# Patient Record
Sex: MALE | Race: WHITE | Employment: OTHER | URBAN - METROPOLITAN AREA
[De-identification: names, ages, dates, MRNs, and addresses within clinical notes are randomized per-mention and may not be internally consistent; named-entity substitution may affect disease eponyms.]

---

## 2019-01-27 ENCOUNTER — TRANSFERRED RECORDS (OUTPATIENT)
Dept: HEALTH INFORMATION MANAGEMENT | Facility: CLINIC | Age: 64
End: 2019-01-27

## 2019-01-27 ENCOUNTER — HOSPITAL ENCOUNTER (EMERGENCY)
Facility: CLINIC | Age: 64
Discharge: HOME | End: 2019-01-27
Attending: EMERGENCY MEDICINE | Admitting: EMERGENCY MEDICINE
Payer: COMMERCIAL

## 2019-01-27 ENCOUNTER — APPOINTMENT (OUTPATIENT)
Dept: GENERAL RADIOLOGY | Facility: CLINIC | Age: 64
End: 2019-01-27
Payer: COMMERCIAL

## 2019-01-27 VITALS
OXYGEN SATURATION: 96 % | WEIGHT: 180.56 LBS | HEART RATE: 58 BPM | TEMPERATURE: 97.8 F | SYSTOLIC BLOOD PRESSURE: 124 MMHG | RESPIRATION RATE: 22 BRPM | DIASTOLIC BLOOD PRESSURE: 67 MMHG

## 2019-01-27 DIAGNOSIS — R07.9 CHEST PAIN, UNSPECIFIED TYPE: ICD-10-CM

## 2019-01-27 LAB
ANION GAP SERPL CALCULATED.3IONS-SCNC: 5 MMOL/L (ref 3–14)
BASOPHILS # BLD AUTO: 0.1 10E9/L (ref 0–0.2)
BASOPHILS NFR BLD AUTO: 0.7 %
BUN SERPL-MCNC: 15 MG/DL (ref 7–30)
CALCIUM SERPL-MCNC: 9.1 MG/DL (ref 8.5–10.1)
CHLORIDE SERPL-SCNC: 108 MMOL/L (ref 94–109)
CO2 SERPL-SCNC: 26 MMOL/L (ref 20–32)
CREAT SERPL-MCNC: 0.88 MG/DL (ref 0.66–1.25)
DIFFERENTIAL METHOD BLD: NORMAL
EOSINOPHIL # BLD AUTO: 0.2 10E9/L (ref 0–0.7)
EOSINOPHIL NFR BLD AUTO: 2.6 %
ERYTHROCYTE [DISTWIDTH] IN BLOOD BY AUTOMATED COUNT: 12.8 % (ref 10–15)
GFR SERPL CREATININE-BSD FRML MDRD: >90 ML/MIN/{1.73_M2}
GLUCOSE SERPL-MCNC: 96 MG/DL (ref 70–99)
HCT VFR BLD AUTO: 44.1 % (ref 40–53)
HGB BLD-MCNC: 14.3 G/DL (ref 13.3–17.7)
IMM GRANULOCYTES # BLD: 0 10E9/L (ref 0–0.4)
IMM GRANULOCYTES NFR BLD: 0.2 %
LYMPHOCYTES # BLD AUTO: 1.5 10E9/L (ref 0.8–5.3)
LYMPHOCYTES NFR BLD AUTO: 16.6 %
MCH RBC QN AUTO: 30.1 PG (ref 26.5–33)
MCHC RBC AUTO-ENTMCNC: 32.4 G/DL (ref 31.5–36.5)
MCV RBC AUTO: 93 FL (ref 78–100)
MONOCYTES # BLD AUTO: 0.7 10E9/L (ref 0–1.3)
MONOCYTES NFR BLD AUTO: 8.3 %
NEUTROPHILS # BLD AUTO: 6.3 10E9/L (ref 1.6–8.3)
NEUTROPHILS NFR BLD AUTO: 71.6 %
NRBC # BLD AUTO: 0 10*3/UL
NRBC BLD AUTO-RTO: 0 /100
PLATELET # BLD AUTO: 306 10E9/L (ref 150–450)
POTASSIUM SERPL-SCNC: 4.1 MMOL/L (ref 3.4–5.3)
RBC # BLD AUTO: 4.75 10E12/L (ref 4.4–5.9)
SODIUM SERPL-SCNC: 139 MMOL/L (ref 133–144)
TROPONIN I SERPL-MCNC: <0.015 UG/L (ref 0–0.04)
TROPONIN I SERPL-MCNC: <0.015 UG/L (ref 0–0.04)
WBC # BLD AUTO: 8.8 10E9/L (ref 4–11)

## 2019-01-27 PROCEDURE — 80048 BASIC METABOLIC PNL TOTAL CA: CPT | Performed by: EMERGENCY MEDICINE

## 2019-01-27 PROCEDURE — 99285 EMERGENCY DEPT VISIT HI MDM: CPT | Mod: 25

## 2019-01-27 PROCEDURE — 84484 ASSAY OF TROPONIN QUANT: CPT | Performed by: EMERGENCY MEDICINE

## 2019-01-27 PROCEDURE — 93005 ELECTROCARDIOGRAM TRACING: CPT

## 2019-01-27 PROCEDURE — 71046 X-RAY EXAM CHEST 2 VIEWS: CPT

## 2019-01-27 PROCEDURE — 85025 COMPLETE CBC W/AUTO DIFF WBC: CPT | Performed by: EMERGENCY MEDICINE

## 2019-01-27 RX ORDER — ASPIRIN 81 MG/1
81 TABLET, CHEWABLE ORAL DAILY
COMMUNITY
End: 2023-07-05

## 2019-01-27 RX ORDER — ATORVASTATIN CALCIUM 10 MG/1
10 TABLET, FILM COATED ORAL DAILY
COMMUNITY
End: 2023-08-29

## 2019-01-27 RX ORDER — DUTASTERIDE 0.5 MG/1
0.5 CAPSULE, LIQUID FILLED ORAL DAILY
COMMUNITY
End: 2023-07-05

## 2019-01-27 RX ORDER — DOXAZOSIN 4 MG/1
4 TABLET ORAL AT BEDTIME
COMMUNITY

## 2019-01-27 RX ORDER — CHLORAL HYDRATE 500 MG
2 CAPSULE ORAL DAILY
COMMUNITY

## 2019-01-27 ASSESSMENT — ENCOUNTER SYMPTOMS
SHORTNESS OF BREATH: 0
NUMBNESS: 1
DIAPHORESIS: 0

## 2019-01-27 NOTE — ED TRIAGE NOTES
Pt states he had left sided chest pain for about 20 minutes around 10:30am while sitting at computer. States his pulse felt fast at the time. 324 ASA total taken today. ABC's intact, alert and oriented x3.

## 2019-01-27 NOTE — ED AVS SNAPSHOT
[facility] Emergency Department  [address]  [facility] [address]  Phone:  [phone]  Fax:  [phone]                                    Francisco Miguel   MRN: [id number]    Department:  [facility] Emergency Department   Date of Visit:  1/27/2019           After Visit Summary Signature Page    I have received my discharge instructions, and my questions have been answered. I have discussed any challenges I see with this plan with the nurse or doctor.    ..........................................................................................................................................  Patient/Patient Representative Signature      ..........................................................................................................................................  Patient Representative Print Name and Relationship to Patient    ..................................................               ................................................  Date                                   Time    ..........................................................................................................................................  Reviewed by Signature/Title    ...................................................              ..............................................  Date                                               Time          22EPIC Rev 08/18

## 2019-01-27 NOTE — ED PROVIDER NOTES
History     Chief Complaint:  Chest Pain    HPI   Francisco Miguel is a 63 year old male with history of TIA who presents to the emergency department for evaluation of chest pain. The patient states that he arrived here after being referred from [facility] urgent care. Earlier today around 1000 hours he had left sided chest pain that lasted for about 15-20 minutes. He reports some numbness into his back as well. He took 2 baby aspirin. He denies shortness of breath or diaphoresis. Patient notes he has a similar episode 2 years ago.    Allergies:  No known Drug Allergies      Medications:    aspirin (ASA) 81 MG chewable tablet  atorvastatin (LIPITOR) 10 MG tablet  doxazosin (CARDURA) 1 MG tablet  dutasteride (AVODART) 0.5 MG capsule  fish oil-omega-3 fatty acids 1000 MG capsule  metFORMIN (GLUCOPHAGE) 500 MG tablet    Past Medical History:    Diabetes  High cholesterol  TIA     Past Surgical History:    Orthopedic surgery     Family History:    History reviewed. No pertinent family history.     Social History:  Social history reviewed. No pertinent social history     Review of Systems   Constitutional: Negative for diaphoresis.   Respiratory: Negative for shortness of breath.    Cardiovascular: Positive for chest pain.   Neurological: Positive for numbness.   All other systems reviewed and are negative.        Physical Exam   First Vitals:  BP: 160/85  Pulse: 72  Heart Rate: 72  Temp: 97.8  F (36.6  C)  Resp: 16  Weight: 81.9 kg (180 lb 8.9 oz)  SpO2: 97 %      Physical Exam  Constitutional: Patient is well appearing. No distress.  Head: Atraumatic.  Mouth/Throat: Oropharynx is clear and moist. No oropharyngeal exudate.  Eyes: Conjunctivae and EOM are normal. No scleral icterus.  Neck: Normal range of motion. Neck supple.   Cardiovascular: Normal rate, regular rhythm, normal heart sounds and intact distal pulses.   Pulmonary/Chest: Breath sounds normal. No respiratory distress.  Abdominal: Soft. Bowel sounds are  normal. No distension. No tenderness. No rebound or guarding.   Musculoskeletal: Normal range of motion. No edema or tenderness.   Neurological: Alert and orientated to person, place, and time. No observable focal neuro deficit  Skin: Warm and dry. No rash noted. Not diaphoretic.     Emergency Department Course     ECG:  ECG taken at 1207, ECG read at 1207  Normal sinus rhythm  Incomplete right bundle branch block  Borderline ECG  Rate 69 bpm. KY interval 134 ms. QRS duration 100 ms. QT/QTc 386/413 ms. P-R-T axes 0 46 48.    Imaging:  Radiology findings were communicated with the patient who voiced understanding of the findings.    XR Chest 2 Views  Final Result  IMPRESSION: No active disease. There is minimal linear scarring in the  left lung.  JUDE YEE MD    Reading per radiology     Laboratory:  Laboratory findings were communicated with the patient who voiced understanding of the findings.    Troponin (Collected 1212): <0.015   CBC: WBC 8.8, HGB 14.3,   BMP: o/w WNL (Creatinine 0.88)  Troponin pending       Medications - No data to display  Emergency Department Course:  Nursing notes and vitals reviewed.   I performed an exam of the patient as documented above.   I discussed the treatment plan with the patient. They expressed understanding of this plan and consented to discharge. They will be discharged home with instructions for care and follow up. In addition, the patient will return to the emergency department if their symptoms persist, worsen, if new symptoms arise or if there is any concern.  All questions were answered.    I personally reviewed the results with the Patient and answered all related questions prior to care transition.  Impression & Plan      Medical Decision Making:  Francisco Miguel is a 63 year old male presented to the Emergency Department with a complaint of chest pain. Fortunately the workup in the ED has been unremarkable and at this time I am not concerned overly for ACS. Fleeting  atypical localized chest pain resolved now and delt 6h trop neg. The EKG shows normal sinus rhythm with incomplete right bundle branch block. The troponin is negative x2. Given these findings, he is low risk for a major cardiac event at 6 weeks.     I considered other possible causes of chest pain including PE, infection, pneumothorax, aortic dissection, and even more benign causes such as reflux and esophageal motility issues. The physical exam, laboratory, and radiological findings listed above make life threatening conditions less likely. At this time I believe the patient is stable for discharge. I have encouraged close Primary Care Physician follow up. I have also discussed an outpatient visit  for cardiac stress testing.  Normal 2 years or less ago. Anticipatory guidance given prior to discharge.     Diagnosis:    ICD-10-CM    1. Chest pain, unspecified type R07.9      Disposition:   Discharged pending care transition and second trop neg.    Discharge Medications:     Review of your medicines      UNREVIEWED medicines. Ask your doctor about these medicines      Dose / Directions   aspirin 81 MG chewable tablet  Commonly known as:  ASA      Dose:  81 mg  Take 81 mg by mouth daily  Refills:  0     atorvastatin 10 MG tablet  Commonly known as:  LIPITOR      Dose:  10 mg  Take 10 mg by mouth daily  Refills:  0     doxazosin 1 MG tablet  Commonly known as:  CARDURA      Dose:  1 mg  Take 1 mg by mouth At Bedtime  Refills:  0     dutasteride 0.5 MG capsule  Commonly known as:  AVODART      Dose:  0.5 mg  Take 0.5 mg by mouth daily  Refills:  0     fish oil-omega-3 fatty acids 1000 MG capsule      Dose:  2 g  Take 2 g by mouth daily  Refills:  0     metFORMIN 500 MG tablet  Commonly known as:  GLUCOPHAGE      Dose:  500 mg  Take 500 mg by mouth 2 times daily (with meals)  Refills:  0            Scribe Disclosure:  Syed CHRISTINA, am serving as a scribe at 12:49 PM on 1/27/2019 to document services personally  performed by Freeman Weston MD, based on my observations and the provider's statements to me.   [facility] EMERGENCY DEPARTMENT       Freeman Weston MD  01/27/19 1053

## 2019-01-28 LAB — INTERPRETATION ECG - MUSE: NORMAL

## 2023-06-20 ENCOUNTER — HOSPITAL ENCOUNTER (EMERGENCY)
Facility: CLINIC | Age: 68
Discharge: HOME | End: 2023-06-20
Attending: PHYSICIAN ASSISTANT | Admitting: PHYSICIAN ASSISTANT
Payer: COMMERCIAL

## 2023-06-20 VITALS
HEART RATE: 70 BPM | RESPIRATION RATE: 16 BRPM | OXYGEN SATURATION: 95 % | SYSTOLIC BLOOD PRESSURE: 127 MMHG | TEMPERATURE: 98.1 F | DIASTOLIC BLOOD PRESSURE: 91 MMHG | WEIGHT: 192.46 LBS

## 2023-06-20 DIAGNOSIS — T78.3XXA ANGIOEDEMA, INITIAL ENCOUNTER: ICD-10-CM

## 2023-06-20 DIAGNOSIS — K57.92 DIVERTICULITIS: ICD-10-CM

## 2023-06-20 LAB
ALBUMIN SERPL BCG-MCNC: 3.5 G/DL (ref 3.5–5.2)
ALP SERPL-CCNC: 161 U/L (ref 40–129)
ALT SERPL W P-5'-P-CCNC: 34 U/L (ref 0–70)
ANION GAP SERPL CALCULATED.3IONS-SCNC: 11 MMOL/L (ref 7–15)
AST SERPL W P-5'-P-CCNC: 31 U/L (ref 0–45)
BASOPHILS # BLD AUTO: 0 10E3/UL (ref 0–0.2)
BASOPHILS NFR BLD AUTO: 0 %
BILIRUB SERPL-MCNC: 0.2 MG/DL
BUN SERPL-MCNC: 10.2 MG/DL (ref 8–23)
CALCIUM SERPL-MCNC: 8.4 MG/DL (ref 8.8–10.2)
CHLORIDE SERPL-SCNC: 101 MMOL/L (ref 98–107)
CREAT SERPL-MCNC: 0.96 MG/DL (ref 0.67–1.17)
DEPRECATED HCO3 PLAS-SCNC: 22 MMOL/L (ref 22–29)
EOSINOPHIL # BLD AUTO: 0 10E3/UL (ref 0–0.7)
EOSINOPHIL NFR BLD AUTO: 0 %
ERYTHROCYTE [DISTWIDTH] IN BLOOD BY AUTOMATED COUNT: 13.2 % (ref 10–15)
GFR SERPL CREATININE-BSD FRML MDRD: 86 ML/MIN/1.73M2
GLUCOSE SERPL-MCNC: 162 MG/DL (ref 70–99)
HCT VFR BLD AUTO: 37.8 % (ref 40–53)
HGB BLD-MCNC: 12.4 G/DL (ref 13.3–17.7)
HOLD SPECIMEN: NORMAL
HOLD SPECIMEN: NORMAL
IMM GRANULOCYTES # BLD: 0 10E3/UL
IMM GRANULOCYTES NFR BLD: 0 %
LIPASE SERPL-CCNC: 16 U/L (ref 13–60)
LYMPHOCYTES # BLD AUTO: 0.5 10E3/UL (ref 0.8–5.3)
LYMPHOCYTES NFR BLD AUTO: 7 %
MCH RBC QN AUTO: 30.1 PG (ref 26.5–33)
MCHC RBC AUTO-ENTMCNC: 32.8 G/DL (ref 31.5–36.5)
MCV RBC AUTO: 92 FL (ref 78–100)
MONOCYTES # BLD AUTO: 0.4 10E3/UL (ref 0–1.3)
MONOCYTES NFR BLD AUTO: 5 %
NEUTROPHILS # BLD AUTO: 5.8 10E3/UL (ref 1.6–8.3)
NEUTROPHILS NFR BLD AUTO: 88 %
NRBC # BLD AUTO: 0 10E3/UL
NRBC BLD AUTO-RTO: 0 /100
PLATELET # BLD AUTO: 250 10E3/UL (ref 150–450)
POTASSIUM SERPL-SCNC: 3.9 MMOL/L (ref 3.4–5.3)
PROT SERPL-MCNC: 6.2 G/DL (ref 6.4–8.3)
RBC # BLD AUTO: 4.12 10E6/UL (ref 4.4–5.9)
SODIUM SERPL-SCNC: 134 MMOL/L (ref 136–145)
WBC # BLD AUTO: 6.7 10E3/UL (ref 4–11)

## 2023-06-20 PROCEDURE — 96361 HYDRATE IV INFUSION ADD-ON: CPT

## 2023-06-20 PROCEDURE — 96374 THER/PROPH/DIAG INJ IV PUSH: CPT

## 2023-06-20 PROCEDURE — 250N000011 HC RX IP 250 OP 636: Performed by: PHYSICIAN ASSISTANT

## 2023-06-20 PROCEDURE — 83690 ASSAY OF LIPASE: CPT | Performed by: PHYSICIAN ASSISTANT

## 2023-06-20 PROCEDURE — 258N000003 HC RX IP 258 OP 636: Performed by: PHYSICIAN ASSISTANT

## 2023-06-20 PROCEDURE — 80053 COMPREHEN METABOLIC PANEL: CPT | Performed by: PHYSICIAN ASSISTANT

## 2023-06-20 PROCEDURE — 85025 COMPLETE CBC W/AUTO DIFF WBC: CPT | Performed by: PHYSICIAN ASSISTANT

## 2023-06-20 PROCEDURE — 96375 TX/PRO/DX INJ NEW DRUG ADDON: CPT

## 2023-06-20 PROCEDURE — 250N000009 HC RX 250

## 2023-06-20 PROCEDURE — 99285 EMERGENCY DEPT VISIT HI MDM: CPT | Mod: 25

## 2023-06-20 PROCEDURE — 36415 COLL VENOUS BLD VENIPUNCTURE: CPT | Performed by: PHYSICIAN ASSISTANT

## 2023-06-20 RX ORDER — DIPHENHYDRAMINE HCL 25 MG
25 CAPSULE ORAL EVERY 6 HOURS PRN
Qty: 20 CAPSULE | Refills: 0 | Status: SHIPPED | OUTPATIENT
Start: 2023-06-20 | End: 2023-07-05

## 2023-06-20 RX ORDER — DIPHENHYDRAMINE HYDROCHLORIDE 50 MG/ML
25 INJECTION INTRAMUSCULAR; INTRAVENOUS ONCE
Status: COMPLETED | OUTPATIENT
Start: 2023-06-20 | End: 2023-06-20

## 2023-06-20 RX ORDER — DEXAMETHASONE SODIUM PHOSPHATE 10 MG/ML
10 INJECTION, SOLUTION INTRAMUSCULAR; INTRAVENOUS ONCE
Status: COMPLETED | OUTPATIENT
Start: 2023-06-20 | End: 2023-06-20

## 2023-06-20 RX ORDER — EPINEPHRINE 0.3 MG/.3ML
0.3 INJECTION SUBCUTANEOUS
Qty: 2 EACH | Refills: 0 | Status: SHIPPED | OUTPATIENT
Start: 2023-06-20

## 2023-06-20 RX ADMIN — FAMOTIDINE 20 MG: 10 INJECTION, SOLUTION INTRAVENOUS at 09:46

## 2023-06-20 RX ADMIN — SODIUM CHLORIDE 1000 ML: 9 INJECTION, SOLUTION INTRAVENOUS at 10:40

## 2023-06-20 RX ADMIN — EPINEPHRINE 0.3 MG: 1 INJECTION INTRAMUSCULAR; INTRAVENOUS; SUBCUTANEOUS at 09:57

## 2023-06-20 RX ADMIN — DEXAMETHASONE SODIUM PHOSPHATE 10 MG: 10 INJECTION, SOLUTION INTRAMUSCULAR; INTRAVENOUS at 09:49

## 2023-06-20 RX ADMIN — DIPHENHYDRAMINE HYDROCHLORIDE 25 MG: 50 INJECTION INTRAMUSCULAR; INTRAVENOUS at 09:43

## 2023-06-20 ASSESSMENT — ACTIVITIES OF DAILY LIVING (ADL)
ADLS_ACUITY_SCORE: 33
ADLS_ACUITY_SCORE: 35
ADLS_ACUITY_SCORE: 35

## 2023-06-20 NOTE — ED PROVIDER NOTES
History     Chief Complaint:  Abdominal Pain and Medication Reaction       The history is provided by the patient and the spouse.      Francisco Miguel is a 68 year old male with a history of hyperlipidemia and diverticulitis who presents with a medication reaction. The patient reports that he was diagnosed with diverticulitis yesterday and prescribed Cipro and Flagyl. His wife states that he took his first dose of the medications this morning at 0830 and shortly afterward, began feeling nauseated and had lip swelling and throat tightness. She then called the nurse triage line and was told to bring the patient in. In the ED, the patient reports that his abdominal pain has resolved, but notes that he is having difficulty breathing with mild shortness of breath. His wife adds that he had chills and a fever of 100.6 throughout last night which resolved with Tylenol this morning. No chills, chest pain, or urinary symptoms. He reports scant bright blood in stool last night. He denies any previous history of allergies, any new medications, and denies any rashes.    Independent Historian:   Spouse/Partner - They report above    Review of External Notes: I reviewed the urgent care note from 6/19/23, where he was diagnosed with diverticulitis and prescribed Flagyl and Cipro.     Medications:    Albuterol sulfate  Finasteride  Atovastatin  Doxazosin  Metformin XR  Ciproflaxin  Metronidazole    Past Medical History:    TIA  Peripheral neuropathy  FLACO  Bilateral sensorineural hearing loss  Dyslipidemia  Prediabetes  Benign prostatic hyperplasoa with nocturia  Diverticulitis    Past Surgical History:    Orthopedic surgery     Physical Exam     Patient Vitals for the past 24 hrs:   BP Temp Temp src Pulse Resp SpO2 Weight   06/20/23 1315 (!) 127/91 -- -- 70 16 95 % --   06/20/23 1300 124/60 -- -- 66 17 95 % --   06/20/23 1245 136/73 -- -- 89 14 95 % --   06/20/23 1229 121/78 -- -- 73 18 94 % --   06/20/23 1130 120/67 -- -- 81 16 95 %  --   06/20/23 1115 119/65 -- -- 77 18 95 % --   06/20/23 1045 126/78 -- -- 103 14 94 % --   06/20/23 1030 106/66 -- -- 78 20 95 % --   06/20/23 1015 103/52 -- -- 74 16 96 % --   06/20/23 1000 98/59 -- -- 82 -- 95 % --   06/20/23 0945 93/57 -- -- 89 -- 94 % --   06/20/23 0940 103/61 -- -- 88 -- 95 % --   06/20/23 0908 -- -- -- -- -- -- 87.3 kg (192 lb 7.4 oz)   06/20/23 0851 105/69 98.1  F (36.7  C) Oral 106 18 97 % 87.3 kg (192 lb 8 oz)        Physical Exam  Constitutional: Alert, attentive, at rest GCS 15  HENT:    Nose: Nose normal.    Mouth/Throat: Mild swelling to both upper and lower lips. No tongue or oral mucosal involvement. Speaks in clear full sentences. No voice changes or drooling. Posterior oropharynx is clear.  Eyes:  EOM are normal.    CV:  regular rate and rhythm; no murmurs, rubs or gallups  Chest: Effort normal and breath sounds normal. No wheezing or stridor.  GI:   Epigastrium - no tenderness, no guarding   RUQ - no tenderness or Carrera's sign   RLQ - No tenderness, no guarding, no Rovsing's sign   Suprapubic area - no tenderness, no guarding    LLQ - no tenderness, no guarding   LUQ - no tenderness, no guarding   No distension. Normal bowel sounds  : Exam refused.  MSK: Normal range of motion.   Neurological: Alert, attentive  Skin: Skin is warm and dry. No rash or urticaria.    Emergency Department Course     Laboratory:  Labs Ordered and Resulted from Time of ED Arrival to Time of ED Departure   COMPREHENSIVE METABOLIC PANEL - Abnormal       Result Value    Sodium 134 (*)     Potassium 3.9      Chloride 101      Carbon Dioxide (CO2) 22      Anion Gap 11      Urea Nitrogen 10.2      Creatinine 0.96      Calcium 8.4 (*)     Glucose 162 (*)     Alkaline Phosphatase 161 (*)     AST 31      ALT 34      Protein Total 6.2 (*)     Albumin 3.5      Bilirubin Total 0.2      GFR Estimate 86     CBC WITH PLATELETS AND DIFFERENTIAL - Abnormal    WBC Count 6.7      RBC Count 4.12 (*)     Hemoglobin 12.4  (*)     Hematocrit 37.8 (*)     MCV 92      MCH 30.1      MCHC 32.8      RDW 13.2      Platelet Count 250      % Neutrophils 88      % Lymphocytes 7      % Monocytes 5      % Eosinophils 0      % Basophils 0      % Immature Granulocytes 0      NRBCs per 100 WBC 0      Absolute Neutrophils 5.8      Absolute Lymphocytes 0.5 (*)     Absolute Monocytes 0.4      Absolute Eosinophils 0.0      Absolute Basophils 0.0      Absolute Immature Granulocytes 0.0      Absolute NRBCs 0.0     LIPASE - Normal    Lipase 16        Emergency Department Course & Assessments:     Interventions:  Medications   diphenhydrAMINE (BENADRYL) injection 25 mg (25 mg Intravenous $Given 6/20/23 0943)   famotidine (PEPCID) injection 20 mg (20 mg Intravenous $Given 6/20/23 0946)   dexamethasone PF (DECADRON) injection 10 mg (10 mg Intravenous $Given 6/20/23 0949)   EPINEPHrine (ADRENALIN) kit 0.3 mg (0.3 mg Intramuscular $Given 6/20/23 0957)   0.9% sodium chloride BOLUS (0 mLs Intravenous Stopped 6/20/23 1148)      Independent Interpretation (X-rays, CTs, rhythm strip):  None    Consultations/Discussion of Management or Tests:  None     Assessments:  ED Course as of 06/20/23 1622   Tue Jun 20, 2023   0911 I obtained history and examined the patient as noted above.    0954 I rechecked and reevaluated the patient.    1007 I rechecked the patient and discussed test results.    1101 I rechecked and reevaluated the patient.   1314 I rechecked the patient and explained findings. He is comfortable with discharge.      Social Determinants of Health affecting care:   None    Disposition:  The patient was discharged to home.     Impression & Plan      Medical Decision Making:  Patient is a well-appearing 68-year-old male who presents with bilateral lip swelling after taking Flagyl and Cipro this morning for recent diagnosis of diverticulitis.  He is afebrile, normotensive, nonhypoxic. No respiratory distress. Physical examination reveals lip swelling as  described above.  No tongue or posterior oropharyngeal involvement.  Findings are suspicious for angioedema versus anaphylaxis.  He has no known allergies and has never experienced this before.   Preliminary labs obtained.  No evidence of leukocytosis and in fact his white blood cell count has fallen since yesterday.  His hemoglobin has dropped one-point from 13.4-12.4 today.  I suspect this is related to patient's bright red blood in his stool today likely secondary to diverticulitis or external hemorrhoids.  I offered a rectal exam however this was declined by patient.  His labs otherwise reassuring.  He has no focal tenderness on exam and so no repeat imaging will be indicated today though I recommend that if he continues to have bloody stools or worsening pain, to return to ED.  Patient was given IV fluids, Decadron, Benadryl, and Pepcid.  He was also given intramuscular epinephrine.  Patient had resolution of his lip swelling and throat tightness.  He was observed for several hours in the ED without recurrence of symptoms.  At this time, I suspect patient has angioedema likely related to his intake of antibiotics today.  He will be sent home with different antibiotics to treat his diverticulitis.  EpiPen and Benadryl also provided to patient.  Patient is otherwise well-appearing and tolerating oral intake.  No indication for hospitalization at this time. He may follow-up with his primary care for recheck in 1-3 days. Supportive cares and return precautions to ED discussed including use of epipen or return of lip swelling. Patient expressed understanding of plan and was ready for discharge.    Diagnosis:    ICD-10-CM    1. Angioedema, initial encounter  T78.3XXA       2. Diverticulitis  K57.92            Discharge Medications:  Discharge Medication List as of 6/20/2023  1:28 PM      START taking these medications    Details   amoxicillin-clavulanate (AUGMENTIN) 875-125 MG tablet Take 1 tablet by mouth 2 times  daily for 10 days, Disp-20 tablet, R-0, E-Prescribe      diphenhydrAMINE (BENADRYL) 25 MG capsule Take 1 capsule (25 mg) by mouth every 6 hours as needed for itching or allergies, Disp-20 capsule, R-0, E-Prescribe      EPINEPHrine (ANY BX GENERIC EQUIV) 0.3 MG/0.3ML injection 2-pack Inject 0.3 mLs (0.3 mg) into the muscle once as needed for anaphylaxis May repeat one time in 5-15 minutes if response to initial dose is inadequate., Disp-2 each, R-0, E-Prescribe              Scribe Disclosure:  I, CHARLES GONZALEZ, am serving as a scribe at 9:08 AM on 6/20/2023 to document services personally performed by Alejandrina Pacheco PA-C based on my observations and the provider's statements to me.      6/20/2023   Alejandrina Pacheco PA-C Steinbrueck, Emily, PA-C  06/20/23 7144

## 2023-06-20 NOTE — DISCHARGE INSTRUCTIONS
Your labs are reassuring today. You were given IV fluids, Benadryl, and Pepcid. You were also given intramuscular epinephrine.  Discontinue the antibiotics metronidazole and ciprofloxacin. You will start a new antibiotic called Augmentin. You may take Tylenol for any fevers or body aches. Follow-up with your primary care provider within the next 2-3 days.  You also be sent home with an EpiPen.  Please use as discussed.  You may also take Benadryl 1 to 2 tablets every 6 hours as needed for facial swelling or itching.  For any new or worsening symptoms including facial swelling, difficulty breathing, respiratory distress, or use of EpiPen, present back to emergency department.

## 2023-06-20 NOTE — ED NOTES
Patient reports that the sensation of throat fullness is unchanged.  No swelling visible or oral exam.  Lungs clear.  VSS.  Continue to monitor.

## 2023-06-20 NOTE — ED NOTES
Patient complained of feeling increased fullness in his throat. Provider notified and has reassessed the patient. Epinephrine ordered and given.

## 2023-06-20 NOTE — ED TRIAGE NOTES
"Patient states he was diagnosed with diverticulitis yesterday at urgent care and prescribed Ciprofloxacin and Metronidazole. Patient states he is having adverse reactions to the medications. Patient reports SOB, lip swelling, and a \"itching in his mouth\".      Triage Assessment     Row Name 06/20/23 0828       Triage Assessment (Adult)    Airway WDL WDL       Respiratory WDL    Respiratory WDL WDL       Skin Circulation/Temperature WDL    Skin Circulation/Temperature WDL WDL       Cardiac WDL    Cardiac WDL WDL       Peripheral/Neurovascular WDL    Peripheral Neurovascular WDL WDL       Cognitive/Neuro/Behavioral WDL    Cognitive/Neuro/Behavioral WDL WDL              "

## 2023-07-05 ENCOUNTER — APPOINTMENT (OUTPATIENT)
Dept: MRI IMAGING | Facility: CLINIC | Age: 68
End: 2023-07-05
Attending: STUDENT IN AN ORGANIZED HEALTH CARE EDUCATION/TRAINING PROGRAM
Payer: COMMERCIAL

## 2023-07-05 ENCOUNTER — APPOINTMENT (OUTPATIENT)
Dept: CT IMAGING | Facility: CLINIC | Age: 68
End: 2023-07-05
Attending: STUDENT IN AN ORGANIZED HEALTH CARE EDUCATION/TRAINING PROGRAM
Payer: COMMERCIAL

## 2023-07-05 ENCOUNTER — HOSPITAL ENCOUNTER (OUTPATIENT)
Facility: CLINIC | Age: 68
Setting detail: OBSERVATION
Discharge: HOME | End: 2023-07-06
Attending: STUDENT IN AN ORGANIZED HEALTH CARE EDUCATION/TRAINING PROGRAM | Admitting: INTERNAL MEDICINE
Payer: COMMERCIAL

## 2023-07-05 ENCOUNTER — APPOINTMENT (OUTPATIENT)
Dept: GENERAL RADIOLOGY | Facility: CLINIC | Age: 68
End: 2023-07-05
Attending: STUDENT IN AN ORGANIZED HEALTH CARE EDUCATION/TRAINING PROGRAM
Payer: COMMERCIAL

## 2023-07-05 DIAGNOSIS — R42 DIZZINESS: ICD-10-CM

## 2023-07-05 DIAGNOSIS — R06.02 SHORTNESS OF BREATH: ICD-10-CM

## 2023-07-05 DIAGNOSIS — G45.9 TIA (TRANSIENT ISCHEMIC ATTACK): Primary | ICD-10-CM

## 2023-07-05 DIAGNOSIS — R53.1 RIGHT SIDED WEAKNESS: ICD-10-CM

## 2023-07-05 DIAGNOSIS — Z86.73 HISTORY OF TIA (TRANSIENT ISCHEMIC ATTACK) AND STROKE: ICD-10-CM

## 2023-07-05 LAB
ANION GAP SERPL CALCULATED.3IONS-SCNC: 9 MMOL/L (ref 7–15)
APTT PPP: 28 SECONDS (ref 22–38)
ATRIAL RATE - MUSE: 71 BPM
BASOPHILS # BLD AUTO: 0 10E3/UL (ref 0–0.2)
BASOPHILS NFR BLD AUTO: 0 %
BUN SERPL-MCNC: 15.7 MG/DL (ref 8–23)
CALCIUM SERPL-MCNC: 9.6 MG/DL (ref 8.8–10.2)
CHLORIDE SERPL-SCNC: 104 MMOL/L (ref 98–107)
CHOLEST SERPL-MCNC: 132 MG/DL
CREAT SERPL-MCNC: 0.88 MG/DL (ref 0.67–1.17)
DEPRECATED HCO3 PLAS-SCNC: 25 MMOL/L (ref 22–29)
DIASTOLIC BLOOD PRESSURE - MUSE: NORMAL MMHG
EOSINOPHIL # BLD AUTO: 0.2 10E3/UL (ref 0–0.7)
EOSINOPHIL NFR BLD AUTO: 3 %
ERYTHROCYTE [DISTWIDTH] IN BLOOD BY AUTOMATED COUNT: 13.1 % (ref 10–15)
GFR SERPL CREATININE-BSD FRML MDRD: >90 ML/MIN/1.73M2
GLUCOSE BLDC GLUCOMTR-MCNC: 111 MG/DL (ref 70–99)
GLUCOSE BLDC GLUCOMTR-MCNC: 84 MG/DL (ref 70–99)
GLUCOSE SERPL-MCNC: 99 MG/DL (ref 70–99)
HCT VFR BLD AUTO: 41.1 % (ref 40–53)
HDLC SERPL-MCNC: 44 MG/DL
HGB BLD-MCNC: 13.6 G/DL (ref 13.3–17.7)
HOLD SPECIMEN: NORMAL
IMM GRANULOCYTES # BLD: 0 10E3/UL
IMM GRANULOCYTES NFR BLD: 0 %
INR PPP: 0.95 (ref 0.85–1.15)
INTERPRETATION ECG - MUSE: NORMAL
LDLC SERPL CALC-MCNC: 73 MG/DL
LYMPHOCYTES # BLD AUTO: 2.1 10E3/UL (ref 0.8–5.3)
LYMPHOCYTES NFR BLD AUTO: 31 %
MCH RBC QN AUTO: 30.1 PG (ref 26.5–33)
MCHC RBC AUTO-ENTMCNC: 33.1 G/DL (ref 31.5–36.5)
MCV RBC AUTO: 91 FL (ref 78–100)
MONOCYTES # BLD AUTO: 0.7 10E3/UL (ref 0–1.3)
MONOCYTES NFR BLD AUTO: 10 %
NEUTROPHILS # BLD AUTO: 3.8 10E3/UL (ref 1.6–8.3)
NEUTROPHILS NFR BLD AUTO: 56 %
NONHDLC SERPL-MCNC: 88 MG/DL
NRBC # BLD AUTO: 0 10E3/UL
NRBC BLD AUTO-RTO: 0 /100
P AXIS - MUSE: 40 DEGREES
PLATELET # BLD AUTO: 287 10E3/UL (ref 150–450)
POTASSIUM SERPL-SCNC: 4.3 MMOL/L (ref 3.4–5.3)
PR INTERVAL - MUSE: 160 MS
QRS DURATION - MUSE: 90 MS
QT - MUSE: 394 MS
QTC - MUSE: 428 MS
R AXIS - MUSE: 23 DEGREES
RBC # BLD AUTO: 4.52 10E6/UL (ref 4.4–5.9)
SODIUM SERPL-SCNC: 138 MMOL/L (ref 136–145)
SYSTOLIC BLOOD PRESSURE - MUSE: NORMAL MMHG
T AXIS - MUSE: 36 DEGREES
TRIGL SERPL-MCNC: 77 MG/DL
TROPONIN T SERPL HS-MCNC: 10 NG/L
TROPONIN T SERPL HS-MCNC: 13 NG/L
VENTRICULAR RATE- MUSE: 71 BPM
WBC # BLD AUTO: 6.8 10E3/UL (ref 4–11)

## 2023-07-05 PROCEDURE — 82310 ASSAY OF CALCIUM: CPT | Performed by: STUDENT IN AN ORGANIZED HEALTH CARE EDUCATION/TRAINING PROGRAM

## 2023-07-05 PROCEDURE — 71046 X-RAY EXAM CHEST 2 VIEWS: CPT

## 2023-07-05 PROCEDURE — 36415 COLL VENOUS BLD VENIPUNCTURE: CPT | Performed by: STUDENT IN AN ORGANIZED HEALTH CARE EDUCATION/TRAINING PROGRAM

## 2023-07-05 PROCEDURE — 85730 THROMBOPLASTIN TIME PARTIAL: CPT | Performed by: STUDENT IN AN ORGANIZED HEALTH CARE EDUCATION/TRAINING PROGRAM

## 2023-07-05 PROCEDURE — 255N000002 HC RX 255 OP 636: Performed by: STUDENT IN AN ORGANIZED HEALTH CARE EDUCATION/TRAINING PROGRAM

## 2023-07-05 PROCEDURE — 85025 COMPLETE CBC W/AUTO DIFF WBC: CPT | Performed by: STUDENT IN AN ORGANIZED HEALTH CARE EDUCATION/TRAINING PROGRAM

## 2023-07-05 PROCEDURE — 70553 MRI BRAIN STEM W/O & W/DYE: CPT

## 2023-07-05 PROCEDURE — 82962 GLUCOSE BLOOD TEST: CPT

## 2023-07-05 PROCEDURE — 96360 HYDRATION IV INFUSION INIT: CPT | Mod: 59

## 2023-07-05 PROCEDURE — 93005 ELECTROCARDIOGRAM TRACING: CPT

## 2023-07-05 PROCEDURE — 80061 LIPID PANEL: CPT | Performed by: PHYSICIAN ASSISTANT

## 2023-07-05 PROCEDURE — 258N000003 HC RX IP 258 OP 636: Performed by: STUDENT IN AN ORGANIZED HEALTH CARE EDUCATION/TRAINING PROGRAM

## 2023-07-05 PROCEDURE — 120N000001 HC R&B MED SURG/OB

## 2023-07-05 PROCEDURE — 70450 CT HEAD/BRAIN W/O DYE: CPT

## 2023-07-05 PROCEDURE — 250N000013 HC RX MED GY IP 250 OP 250 PS 637: Performed by: STUDENT IN AN ORGANIZED HEALTH CARE EDUCATION/TRAINING PROGRAM

## 2023-07-05 PROCEDURE — 250N000013 HC RX MED GY IP 250 OP 250 PS 637: Performed by: PHYSICIAN ASSISTANT

## 2023-07-05 PROCEDURE — A9585 GADOBUTROL INJECTION: HCPCS | Performed by: STUDENT IN AN ORGANIZED HEALTH CARE EDUCATION/TRAINING PROGRAM

## 2023-07-05 PROCEDURE — 84484 ASSAY OF TROPONIN QUANT: CPT | Mod: 91 | Performed by: STUDENT IN AN ORGANIZED HEALTH CARE EDUCATION/TRAINING PROGRAM

## 2023-07-05 PROCEDURE — 70498 CT ANGIOGRAPHY NECK: CPT

## 2023-07-05 PROCEDURE — 99222 1ST HOSP IP/OBS MODERATE 55: CPT | Mod: AI | Performed by: PHYSICIAN ASSISTANT

## 2023-07-05 PROCEDURE — 250N000011 HC RX IP 250 OP 636: Performed by: STUDENT IN AN ORGANIZED HEALTH CARE EDUCATION/TRAINING PROGRAM

## 2023-07-05 PROCEDURE — 99285 EMERGENCY DEPT VISIT HI MDM: CPT | Mod: 25

## 2023-07-05 PROCEDURE — 85610 PROTHROMBIN TIME: CPT | Performed by: STUDENT IN AN ORGANIZED HEALTH CARE EDUCATION/TRAINING PROGRAM

## 2023-07-05 PROCEDURE — 70496 CT ANGIOGRAPHY HEAD: CPT

## 2023-07-05 RX ORDER — AMOXICILLIN 250 MG
1-2 CAPSULE ORAL 2 TIMES DAILY PRN
Status: DISCONTINUED | OUTPATIENT
Start: 2023-07-05 | End: 2023-07-06 | Stop reason: HOSPADM

## 2023-07-05 RX ORDER — ACETAMINOPHEN 325 MG/1
650 TABLET ORAL EVERY 4 HOURS PRN
Status: DISCONTINUED | OUTPATIENT
Start: 2023-07-05 | End: 2023-07-06 | Stop reason: HOSPADM

## 2023-07-05 RX ORDER — ATORVASTATIN CALCIUM 10 MG/1
10 TABLET, FILM COATED ORAL DAILY
Status: DISCONTINUED | OUTPATIENT
Start: 2023-07-06 | End: 2023-07-06

## 2023-07-05 RX ORDER — ASPIRIN 81 MG/1
81 TABLET ORAL DAILY
Status: DISCONTINUED | OUTPATIENT
Start: 2023-07-06 | End: 2023-07-06 | Stop reason: HOSPADM

## 2023-07-05 RX ORDER — ASPIRIN 81 MG/1
243 TABLET, CHEWABLE ORAL ONCE
Status: COMPLETED | OUTPATIENT
Start: 2023-07-05 | End: 2023-07-05

## 2023-07-05 RX ORDER — IOPAMIDOL 755 MG/ML
500 INJECTION, SOLUTION INTRAVASCULAR ONCE
Status: COMPLETED | OUTPATIENT
Start: 2023-07-05 | End: 2023-07-05

## 2023-07-05 RX ORDER — CLOPIDOGREL BISULFATE 75 MG/1
75 TABLET ORAL DAILY
Status: DISCONTINUED | OUTPATIENT
Start: 2023-07-06 | End: 2023-07-06

## 2023-07-05 RX ORDER — GADOBUTROL 604.72 MG/ML
9 INJECTION INTRAVENOUS ONCE
Status: COMPLETED | OUTPATIENT
Start: 2023-07-05 | End: 2023-07-05

## 2023-07-05 RX ORDER — METFORMIN HCL 500 MG
500 TABLET, EXTENDED RELEASE 24 HR ORAL EVERY MORNING
Status: DISCONTINUED | OUTPATIENT
Start: 2023-07-06 | End: 2023-07-06 | Stop reason: HOSPADM

## 2023-07-05 RX ORDER — DOXAZOSIN 4 MG/1
4 TABLET ORAL AT BEDTIME
Status: DISCONTINUED | OUTPATIENT
Start: 2023-07-05 | End: 2023-07-06 | Stop reason: HOSPADM

## 2023-07-05 RX ORDER — CLOPIDOGREL BISULFATE 75 MG/1
300 TABLET ORAL ONCE
Status: COMPLETED | OUTPATIENT
Start: 2023-07-05 | End: 2023-07-05

## 2023-07-05 RX ORDER — ACETAMINOPHEN 650 MG/1
650 SUPPOSITORY RECTAL EVERY 4 HOURS PRN
Status: DISCONTINUED | OUTPATIENT
Start: 2023-07-05 | End: 2023-07-06 | Stop reason: HOSPADM

## 2023-07-05 RX ORDER — METFORMIN HCL 500 MG
500 TABLET, EXTENDED RELEASE 24 HR ORAL EVERY MORNING
COMMUNITY

## 2023-07-05 RX ORDER — ASPIRIN 325 MG
325 TABLET ORAL ONCE
Status: COMPLETED | OUTPATIENT
Start: 2023-07-05 | End: 2023-07-05

## 2023-07-05 RX ORDER — ASPIRIN 81 MG/1
81 TABLET, CHEWABLE ORAL DAILY
Status: DISCONTINUED | OUTPATIENT
Start: 2023-07-06 | End: 2023-07-06 | Stop reason: HOSPADM

## 2023-07-05 RX ORDER — FINASTERIDE 5 MG/1
5 TABLET, FILM COATED ORAL DAILY
COMMUNITY

## 2023-07-05 RX ORDER — ACETAMINOPHEN 325 MG/10.15ML
650 LIQUID ORAL EVERY 4 HOURS PRN
Status: DISCONTINUED | OUTPATIENT
Start: 2023-07-05 | End: 2023-07-06 | Stop reason: HOSPADM

## 2023-07-05 RX ADMIN — ASPIRIN 243 MG: 81 TABLET, CHEWABLE ORAL at 12:50

## 2023-07-05 RX ADMIN — IOPAMIDOL 75 ML: 755 INJECTION, SOLUTION INTRAVENOUS at 10:45

## 2023-07-05 RX ADMIN — GADOBUTROL 9 ML: 604.72 INJECTION INTRAVENOUS at 12:35

## 2023-07-05 RX ADMIN — SODIUM CHLORIDE 100 ML: 9 INJECTION, SOLUTION INTRAVENOUS at 10:45

## 2023-07-05 RX ADMIN — CLOPIDOGREL BISULFATE 300 MG: 75 TABLET ORAL at 11:29

## 2023-07-05 RX ADMIN — DOXAZOSIN 4 MG: 4 TABLET ORAL at 21:07

## 2023-07-05 ASSESSMENT — ACTIVITIES OF DAILY LIVING (ADL)
ADLS_ACUITY_SCORE: 35
ADLS_ACUITY_SCORE: 31
ADLS_ACUITY_SCORE: 31
ADLS_ACUITY_SCORE: 35
ADLS_ACUITY_SCORE: 31
ADLS_ACUITY_SCORE: 31
ADLS_ACUITY_SCORE: 35

## 2023-07-05 NOTE — H&P
[facility]    Hospitalist History and Physical    Name: Francisco Miguel    MRN: [id number]  YOB: 1955    Age: 68 year old  Date of Admission:  7/5/2023  Date of Service (when I saw the patient): 07/05/23    Assessment & Plan   Francisco Miguel is a 68 year old male with PMH significant for prediabetes, BPH, HLD and previous TIA who presents to the ED on 7/5/2023 for evaluation of dizziness as well decreased coordination of right arm along with weakness of right arm.     ED workup reveals: intermittently elevated BP, CBC unremarkable, BMP WNL, INR and PTT WNL, troponin of 13 with repeat of 10, CT of head negative for acute intracranial hemorrhage, mass, or herniation, CTA of head/neck shows patent arteries and neck without evidence of dissection, mild atherosclerotic disease in the carotid arteries (R>L), patent proximal major intracranial arteries without vascular cutoff, no significant stenosis, presumed atherosclerotic disease of the aortic arch and origins of the great vessels without significant stenosis, chest x-ray negative, EKG shows rate of 71 bpm in sinus rhythm, and MRI brain negative for acute infarct, mass, hemorrhage, herniation, few nonspecific white matter changes likely due to chronic microvascular ischemic disease.     #Suspected TIA: episode on 7/5 at 9:30 AM while walking of dizziness, bilateral blurry vision, unsteady gait, uncoordinated movement of right UE, and right UE weakness. No facial droop or speech changes per spouse that was with patient. Episode slowly resolved by the time he was interviewed for admission and felt his symptoms had almost completely resolved around 12-1 PM. Slightly decreased strength of right UE per ED provider with no focal deficits on reassessment. Similar episode in 2007 or 2008 when patient was diagnosed with TIA while residing in [facility]. Previously on ASA but taken off a few years prior. CT of head, CTA of head/neck, and MRI of brain  "negative for acute pathology. Due to associated shortness of breath with episode a CXR was completed and negative. Based on presentation suspect his symptoms may of been a TIA with negative imaging.   -serial neuro checks  -received Plavix 300 mg and  mg in ED based on neurology recommendation   -neurology initially recommending Plavix 75 mg daily with ASA 81 mg for 21 days then ASA 81 mg indefinitely, will continue for now and confirm based on negative MRI recommendations moving forward   -stroke neurology consult  -continue PTA Atorvastatin and check updated lipid panel  -obtain echocardiogram without bubble based on age  -no current deficits, will not order PT/OT/Speech at this time  -monitor on telemetry     #Elevated BP: BP slightly elevated in the 140/70s, no history of hypertension.   -monitor BP over next day with goal of <135/80    #Prediabetes: most recent HgbA1c of 5.6 on 6/19/2023 with initial blood glucose of 99.  -will not check HgbA1c since just checked through [facility]  -since well controlled will not perform blood glucose checks  -continue PTA Metformin     #HLD: continue PTA Atorvastatin for now, adjust pending lipid panel in AM    #BPH: resume PTA Finasteride and Doxazosin     Clinically Significant Risk Factors Present on Admission                  # Hypertension: Home medication list includes antihypertensive(s)      # Overweight: Estimated body mass index is 28.89 kg/m  as calculated from the following:    Height as of this encounter: 1.727 m (5' 8\").    Weight as of this encounter: 86.2 kg (190 lb).          DVT Prophylaxis: Ambulate every shift  Code Status: Full Code, discussed with patient  Disposition: Expected discharge in 24-48 hours once TIA workup completed, will admit to observation     Primary Care Physician   Sulaiman Perales    Chief Complaint   Dizziness, decreased coordination, and right upper extremity weakness     History obtained from discussion with ED " provider, Dr. Puckett, chart review, and interview with patient.      History of Present Illness   Francisco Miguel is a 68 year old male who presents with onset of dizziness and unsteady gait that started while on a walk with his spouse, granddaughter, and dog this morning around 9:30 AM.  He notes associated shortness of breath with this episode without chest pain.  He was able to walk back to his house.  On returning home he noticed his right arm appeared weaker when attempting to lift it.  He notes associated blurry vision.  Denies nausea, vomiting, headache, syncope, changes in hearing, changes in speech, or facial droop per his spouse.  The patient believes his symptoms lasted until around noon.  Since he was seen in the emergency room his symptoms have slowly resolved and he is currently asymptomatic.  Patient states his symptoms are almost the same as when he had a previous TIA in 2007/2008 when he resided in [facility].  Patient was previously on aspirin but stopped taking per recommendations by a cardiologist in the last couple years.  Does note that he recently was treated for diverticulitis in June where he had an allergic reaction to ciprofloxacin or metronidazole causing angioedema.  Due to this the patient's ciprofloxacin was discontinued and he was transitioned to Augmentin that he completed for treatment of his diverticulitis.    Past Medical History    Past Medical History:   Diagnosis Date     Diabetes (H)      High cholesterol      TIA (transient ischemic attack)      Past Surgical History   Past Surgical History:   Procedure Laterality Date     ORTHOPEDIC SURGERY       Prior to Admission Medications   Prior to Admission Medications   Prescriptions Last Dose Informant Patient Reported? Taking?   EPINEPHrine (ANY BX GENERIC EQUIV) 0.3 MG/0.3ML injection 2-pack   No No   Sig: Inject 0.3 mLs (0.3 mg) into the muscle once as needed for anaphylaxis May repeat one time in 5-15 minutes if response to initial dose is  inadequate.   atorvastatin (LIPITOR) 10 MG tablet 7/5/2023 at AM  Yes Yes   Sig: Take 10 mg by mouth daily   doxazosin (CARDURA) 1 MG tablet 7/4/2023 at PM  Yes Yes   Sig: Take 4 mg by mouth At Bedtime   finasteride (PROSCAR) 5 MG tablet 7/5/2023 at AM  Yes Yes   Sig: Take 5 mg by mouth daily   fish oil-omega-3 fatty acids 1000 MG capsule 7/5/2023 at AM  Yes Yes   Sig: Take 2 g by mouth daily   metFORMIN (GLUCOPHAGE XR) 500 MG 24 hr tablet 7/5/2023 at AM  Yes Yes   Sig: Take 500 mg by mouth every morning      Facility-Administered Medications: None     Allergies   Allergies   Allergen Reactions     Ciprofloxacin Angioedema     Lip swelling, throat fullness, nausea.       Metronidazole Angioedema     Lip swelling, throat fullness, nausea     Social History   Social History     Tobacco Use     Smoking status: Not on file     Smokeless tobacco: Not on file   Substance Use Topics     Alcohol use: Not on file     Social History     Social History Narrative     Not on file     Family History   Family history reviewed with patient and significant for stroke in brother.    Review of Systems   A Comprehensive greater than 10 system review of systems was carried out.  Pertinent positives and negatives are noted above.  Otherwise negative for contributory information.    Physical Exam   Temp: 98.7  F (37.1  C) Temp src: Temporal BP: 133/76 Pulse: 67   Resp: 23 SpO2: 95 %      Vital Signs with Ranges  Temp:  [98.7  F (37.1  C)] 98.7  F (37.1  C)  Pulse:  [67-82] 67  Resp:  [20-23] 23  BP: (133-144)/(76-80) 133/76  SpO2:  [95 %-96 %] 95 %  190 lbs 0 oz    GEN:  Alert, oriented x 3, appears comfortable sitting on gurney, no overt distress  HEENT:  Normocephalic/atraumatic, no scleral icterus, no nasal discharge, mouth moist.  CV:  Regular rate and rhythm, no murmur or JVD.  S1 + S2 noted, no S3 or S4.  LUNGS:  Clear to auscultation bilaterally without rales/rhonchi/wheezing/retractions.  Symmetric chest rise on inhalation  noted.  ABD:  Active bowel sounds, soft, non-tender/non-distended.  No rebound/guarding/rigidity.  EXT:  No edema.  No cyanosis.  No acute joint synovitis noted.  SKIN:  Dry to touch, no exanthems noted in the visualized areas.  NEURO:  Symmetric muscle strength, sensation to touch grossly intact. Coordination symmetric with finger to nose and rapid alternating movement.  No new focal deficits appreciated.    Data   Data reviewed today:  I personally reviewed the EKG tracing showing rate of 71 bpm in sinus rhythm.    Results for orders placed or performed during the hospital encounter of 07/05/23   CT Head w/o Contrast     Status: None    Narrative    CT SCAN OF THE HEAD WITHOUT CONTRAST   7/5/2023 10:47 AM     HISTORY: Code stroke to evaluate for potential thrombolysis and  thrombectomy. Dizziness.    TECHNIQUE:  Axial images of the head and coronal reformations without  IV contrast material. Radiation dose for this scan was reduced using  automated exposure control, adjustment of the mA and/or kV according  to patient size, or iterative reconstruction technique.    COMPARISON: None.    FINDINGS: There is no evidence of intracranial hemorrhage, mass, acute  infarct or anomaly. The ventricles are normal in size, shape and  configuration. The brain parenchyma and subarachnoid spaces are  normal.     Scattered mucosal thickening in the visualized paranasal sinuses. The  bony calvarium and bones of the skull base appear intact.       Impression    IMPRESSION: No evidence of acute intracranial hemorrhage, mass, or  herniation.      ROGELIO RIVAS MD         SYSTEM ID:  [id number]   CTA Head Neck with Contrast     Status: None    Narrative    CT ANGIOGRAM OF THE HEAD AND NECK WITH CONTRAST  7/5/2023 10:49 AM     HISTORY: Code stroke to evaluate for potential thrombolysis and  thrombectomy. Dizziness.    TECHNIQUE: CT angiography with an injection of 75mL [id number]-370 IV with  scans through the head and neck. Images were  transferred to a separate  3-D workstation where multiplanar reformations and 3-D images were  created. Estimates of carotid stenoses are made relative to the distal  internal carotid artery diameters except as noted. Radiation dose for  this scan was reduced using automated exposure control, adjustment of  the mA and/or kV according to patient size, or iterative  reconstruction technique.    COMPARISON: None.     CT HEAD FINDINGS: No contrast enhancing lesions. Cerebral blood flow  is grossly normal.     CT ANGIOGRAM HEAD FINDINGS:  The major intracranial arteries including  the proximal branches of the anterior cerebral, middle cerebral, and  posterior cerebral arteries appear patent without vascular cutoff. No  aneurysm identified. No significant stenosis. Venous circulation is  unremarkable.     CT ANGIOGRAM NECK FINDINGS:   Prominent soft tissue is seen at the aortic arch and origins of the  great vessels. Mild calcification in the soft tissues at the aortic  arch. This may be due to atherosclerotic disease.    Right carotid artery: The right common and internal carotid arteries  are patent. Mild atherosclerotic disease at the carotid bifurcation  and proximal internal carotid artery without significant stenosis by  NASCET criteria.     Left carotid artery: The left common and internal carotid arteries are  patent. Mild atherosclerotic disease at the carotid bifurcation  without stenosis.     Vertebral arteries: Vertebral arteries are patent without evidence of  dissection. No significant stenosis.     Other findings: None.       Impression    IMPRESSION:   1. Patent arteries in the neck without evidence of dissection. Mild  atherosclerotic disease in the carotid arteries right worse than left  without significant stenosis by NASCET criteria.  2. Patent proximal major intracranial arteries without vascular  cutoff. No significant stenosis. No aneurysm identified.   3. Presumed atherosclerotic disease at the  aortic arch and origins of  the great vessels without significant stenosis.    Results discussed with Lupillo Puckett at 10:58 AM on 7/5/2023.     ROGELIO RIVAS MD         SYSTEM ID:  [id number]   XR Chest 2 Views     Status: None    Narrative    XR CHEST 2 VIEWS 7/5/2023 12:02 PM    HISTORY: Shortness of breath    COMPARISON: X-ray 1/27/2019      Impression    IMPRESSION: No acute findings. The lungs are clear and there are no  pleural effusions. Normal heart size.    NADIRA JOSEPH MD         SYSTEM ID:  [id number]   MR Brain w/o & w Contrast     Status: None    Narrative    MRI BRAIN WITHOUT AND WITH CONTRAST  7/5/2023 12:34 PM     HISTORY: Right-sided weakness.     TECHNIQUE: Multiplanar, multisequence MRI of the brain without and  with 9 mL Gadavist     COMPARISON: Head CT from earlier the same day.     FINDINGS: There is no evidence of acute infarct, hemorrhage, mass, or  herniation. A few subcortical white matter T2 hyperintensities which  are nonspecific, but likely related to chronic microvascular ischemic  disease. Ventricular size within normal limits without hydrocephalus.     There is no abnormal intracranial enhancement.     Scattered mucosal thickening in the paranasal sinuses. The major  arterial T2 flow voids at the base of the brain appear patent.       Impression    IMPRESSION:    1. No evidence of acute infarct, mass, hemorrhage, or herniation.  2. A few nonspecific white matter changes likely due to chronic  microvascular ischemic disease.      ROGELIO RIVAS MD         SYSTEM ID:  [id number]   Extra Tube ([place] Draw)     Status: None    Narrative    The following orders were created for panel order Extra Tube ([place] Draw).  Procedure                               Abnormality         Status                     ---------                               -----------         ------                     Extra Blue Top Tube[[id number]]                                                         Extra Red Top  Tube[[id number]]                               Final result               Extra Green Top (Lithium...[[id number]]                                                 Extra Purple Top Tube[[id number]]                                                         Please view results for these tests on the individual orders.   Basic metabolic panel     Status: Normal   Result Value Ref Range    Sodium 138 136 - 145 mmol/L    Potassium 4.3 3.4 - 5.3 mmol/L    Chloride 104 98 - 107 mmol/L    Carbon Dioxide (CO2) 25 22 - 29 mmol/L    Anion Gap 9 7 - 15 mmol/L    Urea Nitrogen 15.7 8.0 - 23.0 mg/dL    Creatinine 0.88 0.67 - 1.17 mg/dL    Calcium 9.6 8.8 - 10.2 mg/dL    Glucose 99 70 - 99 mg/dL    GFR Estimate >90 >60 mL/min/1.73m2   INR     Status: Normal   Result Value Ref Range    INR 0.95 0.85 - 1.15   Partial thromboplastin time     Status: Normal   Result Value Ref Range    aPTT 28 22 - 38 Seconds   Troponin T, High Sensitivity     Status: Normal   Result Value Ref Range    Troponin T, High Sensitivity 13 <=22 ng/L   Extra Red Top Tube     Status: None   Result Value Ref Range    Hold Specimen JIC    CBC with platelets and differential     Status: None   Result Value Ref Range    WBC Count 6.8 4.0 - 11.0 10e3/uL    RBC Count 4.52 4.40 - 5.90 10e6/uL    Hemoglobin 13.6 13.3 - 17.7 g/dL    Hematocrit 41.1 40.0 - 53.0 %    MCV 91 78 - 100 fL    MCH 30.1 26.5 - 33.0 pg    MCHC 33.1 31.5 - 36.5 g/dL    RDW 13.1 10.0 - 15.0 %    Platelet Count 287 150 - 450 10e3/uL    % Neutrophils 56 %    % Lymphocytes 31 %    % Monocytes 10 %    % Eosinophils 3 %    % Basophils 0 %    % Immature Granulocytes 0 %    NRBCs per 100 WBC 0 <1 /100    Absolute Neutrophils 3.8 1.6 - 8.3 10e3/uL    Absolute Lymphocytes 2.1 0.8 - 5.3 10e3/uL    Absolute Monocytes 0.7 0.0 - 1.3 10e3/uL    Absolute Eosinophils 0.2 0.0 - 0.7 10e3/uL    Absolute Basophils 0.0 0.0 - 0.2 10e3/uL    Absolute Immature Granulocytes 0.0 <=0.4 10e3/uL    Absolute NRBCs 0.0 10e3/uL    Glucose by meter     Status: Abnormal   Result Value Ref Range    GLUCOSE BY METER POCT 111 (H) 70 - 99 mg/dL   Troponin T, High Sensitivity     Status: Normal   Result Value Ref Range    Troponin T, High Sensitivity 10 <=22 ng/L   EKG 12-lead, tracing only     Status: None (Preliminary result)   Result Value Ref Range    Systolic Blood Pressure  mmHg    Diastolic Blood Pressure  mmHg    Ventricular Rate 71 BPM    Atrial Rate 71 BPM    OH Interval 160 ms    QRS Duration 90 ms     ms    QTc 428 ms    P Axis 40 degrees    R AXIS 23 degrees    T Axis 36 degrees    Interpretation ECG       Sinus rhythm  Normal ECG  When compared with ECG of 27-JAN-2019 12:07,  No significant change was found     CBC with Platelets & Differential     Status: None    Narrative    The following orders were created for panel order CBC with Platelets & Differential.  Procedure                               Abnormality         Status                     ---------                               -----------         ------                     CBC with platelets and d...[[id number]]                      Final result                 Please view results for these tests on the individual orders.     [facility]  Securely message with the Vocera Web Console (learn more here)  Text page via [facility] Paging/Directory  I discussed the patient with Dr. Martins and he agrees with the above plan.

## 2023-07-05 NOTE — PHARMACY-ADMISSION MEDICATION HISTORY
Pharmacist Admission Medication History    Admission medication history is complete. The information provided in this note is only as accurate as the sources available at the time of the update.    Medication reconciliation/reorder completed by provider prior to medication history? No    Information Source(s): Patient via in-person    Pertinent Information: raquel    Changes made to PTA medication list:    Added: Finasteride    Deleted: Aspirin 81mg, dutasteride, benadryl    Changed: Doxazosin dose, metformin frequency/formulation    Medication Affordability:   No issues    Allergies reviewed with patient and updates made in EHR: yes    Medication History Completed By: Bandar Thornton RPH 7/5/2023 11:45 AM    Prior to Admission medications    Medication Sig Last Dose Taking? Auth Provider Long Term End Date   atorvastatin (LIPITOR) 10 MG tablet Take 10 mg by mouth daily 7/5/2023 at AM Yes Reported, Patient Yes    doxazosin (CARDURA) 1 MG tablet Take 4 mg by mouth At Bedtime 7/4/2023 at PM Yes Reported, Patient Yes    finasteride (PROSCAR) 5 MG tablet Take 5 mg by mouth daily 7/5/2023 at AM Yes Unknown, Entered By History     fish oil-omega-3 fatty acids 1000 MG capsule Take 2 g by mouth daily 7/5/2023 at AM Yes Reported, Patient     metFORMIN (GLUCOPHAGE XR) 500 MG 24 hr tablet Take 500 mg by mouth every morning 7/5/2023 at AM Yes Unknown, Entered By History No    EPINEPHrine (ANY BX GENERIC EQUIV) 0.3 MG/0.3ML injection 2-pack Inject 0.3 mLs (0.3 mg) into the muscle once as needed for anaphylaxis May repeat one time in 5-15 minutes if response to initial dose is inadequate.   Alejandrina Pacheco PA-C

## 2023-07-05 NOTE — CONSULTS
"[facility]    Stroke Consult Note    Reason for Consult: Stroke Code     Chief Complaint: Dizziness      HPI  Francisco Miguel is a 68 year old male with PMH of HLD, prediabetes, ? Prior TIA comes in after acute onset dizziness one hour prior to hospital arrival while he was walking. Also noticed decreased coordination of R arm and weakness in right arm and leg.     On ED arrival exam was remarkable for mild weakness in RUE. Normal glucose. BP in 140s/80s    Imaging Findings  CT head: unremarkable for any early ischemic changes or hemorrhage  CTA head and neck: no obvious large vessel occlusion or critical stenosis visualized.    Intravenous Thrombolysis  Not given due to:   - minor/isolated/quickly resolving symptoms    Endovascular Treatment  Not initiated due to absence of proximal vessel occlusion    Impression   Acute ischemic stroke     Recommendations  Acute stroke management  - Neuro checks every 4 hours  - Continue permissive hypertension with systolic cap of 180 mmHg, no need to lower blood pressure acutely while hospitalized  - PT/OT/ST consults    Diagnostic testing  - Continue telemetry while inpatient  - Await echocardiogram  - HbA1c, Lipid profile    Secondary stroke prevention  - Load with Plavix 300mg; continue Plavix 75 mg and aspirin 81mg daily together for 21 days; then aspirin 81mg alone indefinitely  - Statin for goal LDL 40-70, will start lipitor after lipid profile results  - Long term BP goal <135/80 to be achieved as outpatient within several weeks  - Better control of DM2 needed, goal A1C <7.0  - Education: [facility] stroke education consult  - Mediterranean diet and daily exercise      Patient Follow-up     - final recommendation pending work-up    Thank you for this consult. We will continue to follow.      The Stroke Staff is Dr. Edmonds.    Gena Fuentes MD  Vascular Neurology Fellow    To page me or covering stroke neurology team member, click here: AMCOM  Choose \"On " "Call\" tab at top, then select \"NEUROLOGY/ALL SITES\" from middle drop-down box, press Enter, then look for \"stroke\" or \"telestroke\" for your site.    __________Past Medical History   Past Medical History:   Diagnosis Date     Diabetes (H)      High cholesterol      TIA (transient ischemic attack)      Past Surgical History   Past Surgical History:   Procedure Laterality Date     ORTHOPEDIC SURGERY       Medications   Home Meds  Prior to Admission medications    Medication Sig Start Date End Date Taking? Authorizing Provider   aspirin (ASA) 81 MG chewable tablet Take 81 mg by mouth daily    Reported, Patient   atorvastatin (LIPITOR) 10 MG tablet Take 10 mg by mouth daily    Reported, Patient   diphenhydrAMINE (BENADRYL) 25 MG capsule Take 1 capsule (25 mg) by mouth every 6 hours as needed for itching or allergies 6/20/23   Alejandrina Pacheco PA-C   doxazosin (CARDURA) 1 MG tablet Take 1 mg by mouth At Bedtime    Reported, Patient   dutasteride (AVODART) 0.5 MG capsule Take 0.5 mg by mouth daily    Reported, Patient   EPINEPHrine (ANY BX GENERIC EQUIV) 0.3 MG/0.3ML injection 2-pack Inject 0.3 mLs (0.3 mg) into the muscle once as needed for anaphylaxis May repeat one time in 5-15 minutes if response to initial dose is inadequate. 6/20/23   Alejandrina Pacheco PA-C   fish oil-omega-3 fatty acids 1000 MG capsule Take 2 g by mouth daily    Reported, Patient   metFORMIN (GLUCOPHAGE) 500 MG tablet Take 500 mg by mouth 2 times daily (with meals)    Reported, Patient       Scheduled Meds    aspirin  325 mg Oral Once     clopidogrel  300 mg Oral Once       Infusion Meds      PRN Meds      Allergies   Allergies   Allergen Reactions     Ciprofloxacin Angioedema     Lip swelling, throat fullness, nausea.       Metronidazole Angioedema     Lip swelling, throat fullness, nausea     Family History   No family history on file.  Social History        PHYSICAL EXAMINATION  Temp:  [98.7  F (37.1  C)] 98.7  F (37.1  C)  Pulse:  [67-82] " 67  Resp:  [20-23] 23  BP: (133-144)/(76-80) 133/76  SpO2:  [95 %-96 %] 95 %     Neuro Exam  Mental Status: alet, follows commands, speech clear and fluent   Cranial Nerves:  visual fields intact (tested by nurse), EOMI with normal smooth pursuit, facial sensation intact and symmetric (tested by nurse), facial movements symmetric, hearing not formally tested but intact to conversation, no dysarthria, tongue protrusion midline  Motor:  no abnormal movements, able to move all limbs antigravity spontaneously with no signs of hemiparesis observed, no pronator drift  Reflexes:  unable to test (telestroke)  Sensory:  sensation intact but decreased on R side by 10%  Coordination:  normal finger-to-nose and heel-to-shin bilaterally without dysmetria, rapid alternating movements symmetric  Station/Gait:  unable to test due to telestroke      Stroke Scales    NIHSS  1a. Level of Consciousness 0-->Alert, keenly responsive   1b. LOC Questions 0-->Answers both questions correctly   1c. LOC Commands 0-->Performs both tasks correctly   2.   Best Gaze 0-->Normal   3.   Visual 0-->No visual loss   4.   Facial Palsy 0-->Normal symmetrical movements   5a. Motor Arm, Left 0-->No drift, limb holds 90 (or 45) degrees for full 10 secs   5b. Motor Arm, Right 0-->No drift, limb holds 90 (or 45) degrees for full 10 secs   6a. Motor Leg, Left 0-->No drift, leg holds 30 degree position for full 5 secs   6b. Motor Leg, right 0-->No drift, leg holds 30 degree position for full 5 secs   7.   Limb Ataxia 0-->Absent   8.   Sensory 1-->Mild-to-moderate sensory loss, patient feels pinprick is less sharp or is dull on the affected side, or there is a loss of superficial pain with pinprick, but patient is aware of being touched   9.   Best Language 0-->No aphasia, normal   10. Dysarthria 0-->Normal   11. Extinction and Inattention  0-->No abnormality   Total 1 (07/05/23 1105)       Modified Golden Score (Pre-morbid)  0 - No symptoms.    Imaging  I  personally reviewed all imaging; relevant findings per HPI.     Lab Results Data   CBC  Recent Labs   Lab 07/05/23  1033   WBC 6.8   RBC 4.52   HGB 13.6   HCT 41.1        Basic Metabolic Panel    Recent Labs   Lab 07/05/23  1033 07/05/23  1030     --    POTASSIUM 4.3  --    CHLORIDE 104  --    CO2 25  --    BUN 15.7  --    CR 0.88  --    GLC 99 111*   JOSE 9.6  --      Liver Panel  No results for input(s): PROTTOTAL, ALBUMIN, BILITOTAL, ALKPHOS, AST, ALT, BILIDIRECT in the last 168 hours.  INR    Recent Labs   Lab Test 07/05/23  1033   INR 0.95      Lipid Profile  No lab results found.  A1C  No lab results found.  Troponin    Recent Labs   Lab 07/05/23  1033   CTROPT 13          Stroke Code Data Data   Stroke Code Data  (for stroke code with tele)  Stroke code activated 07/05/23   1032   First stroke provider response         Video start time 07/05/23   1054   Video end time 07/05/23   1107   Last known normal 07/05/23   0930   Time of discovery  (or onset of symptoms)  07/05/23   0930   Head CT read by Stroke Neuro Dr/Provider 07/05/23   1051   Was stroke code de-escalated? Yes 07/05/23 1109           Telestroke Service Details  Type of service telemedicine diagnostic assessment of acute neurological changes   Reason telemedicine is appropriate patient requires assessment with a specialist for diagnosis and treatment of neurological symptoms   Mode of transmission secure interactive audio and video communication per Zechariah   Originating site (patient location) [facility]    Distant site (provider location) [facility]

## 2023-07-05 NOTE — PROGRESS NOTES
ROOM # 204-2    Living Situation (if not independent, order SW consult):  Facility name:  : [facility] ( Spouse)    Activity level at baseline: IND  Activity level on admit: IND    Who will be transporting you at discharge:     Patient registered to observation; given Patient Bill of Rights; given the opportunity to ask questions about observation status and their plan of care.  Patient has been oriented to the observation room, bathroom and call light is in place.    Discussed discharge goals and expectations with patient/family.

## 2023-07-05 NOTE — PLAN OF CARE
"PRIMARY DIAGNOSIS:TIA  OUTPATIENT/OBSERVATION GOALS TO BE MET BEFORE DISCHARGE:  1. Orthostatic performed: Yes:          Lying Orthostatic BP: 150/70         Sitting Orthostatic BP: 132/65         Standing Orthostatic BP: 102/64     2. Diagnostic testing complete & at baseline neurologic testing: Yes    3. Cleared by consultants (if involved): No    4. Interpretation of cardiac rhythm per telemetry tech: SR with hr 65    5. Tolerating adequate PO diet and medications: Yes    6. Return to near baseline physical activity or neurologic status: Yes    Discharge Planner Nurse   Safe discharge environment identified: Yes  Barriers to discharge: Yes       Entered by: Kimberly Martin RN 07/05/2023   Patient is A&O x 4. Up Independent in room. Tolerating regular diet. Telemetry monitor place. BS checks q4. Neuro checks are WLD. Care ongoing.     /76   Pulse 66   Temp 97.6  F (36.4  C) (Oral)   Resp 23   Ht 1.727 m (5' 8\")   Wt 83.6 kg (184 lb 3.2 oz)   SpO2 97%   BMI 28.01 kg/m      Please review provider order for any additional goals.   Nurse to notify provider when observation goals have been met and patient is ready for discharge.Goal Outcome Evaluation:                        "

## 2023-07-05 NOTE — ED NOTES
"[facility]  ED Nurse Handoff Report    ED Chief complaint: Dizziness  . ED Diagnosis:   Final diagnoses:   Dizziness   Right sided weakness   Shortness of breath       Allergies:   Allergies   Allergen Reactions     Ciprofloxacin Angioedema     Lip swelling, throat fullness, nausea.       Metronidazole Angioedema     Lip swelling, throat fullness, nausea       Code Status: Full Code    Activity level - Baseline/Home:  independent.  Activity Level - Current:   independent.   Lift room needed: No.   Bariatric: No   Needed: No   Isolation: No.   Infection: Not Applicable.     Respiratory status: Room air    Vital Signs (within 30 minutes):   Vitals:    07/05/23 1021 07/05/23 1052   BP: (!) 144/80 133/76   Pulse: 82 67   Resp: 20 23   Temp: 98.7  F (37.1  C)    TempSrc: Temporal    SpO2: 96% 95%   Weight: 86.2 kg (190 lb)    Height: 1.727 m (5' 8\")        Cardiac Rhythm:  ,      Pain level:    Patient confused: No.   Patient Falls Risk: patient and family education.   Elimination Status: Has voided     Patient Report - Initial Complaint: Dizziness.   Focused Assessment: A&O. Ambulatory. Presents with sudden onset of dizziness and right sided weakness that started approx 1 hour prior to arrival. Symptoms slowly improved since arrival. CT/CTA negative. Asa and plavix started. MRI pending.      Abnormal Results:   Labs Ordered and Resulted from Time of ED Arrival to Time of ED Departure   GLUCOSE BY METER - Abnormal       Result Value    GLUCOSE BY METER POCT 111 (*)    BASIC METABOLIC PANEL - Normal    Sodium 138      Potassium 4.3      Chloride 104      Carbon Dioxide (CO2) 25      Anion Gap 9      Urea Nitrogen 15.7      Creatinine 0.88      Calcium 9.6      Glucose 99      GFR Estimate >90     INR - Normal    INR 0.95     PARTIAL THROMBOPLASTIN TIME - Normal    aPTT 28     TROPONIN T, HIGH SENSITIVITY - Normal    Troponin T, High Sensitivity 13     CBC WITH PLATELETS AND DIFFERENTIAL "    WBC Count 6.8      RBC Count 4.52      Hemoglobin 13.6      Hematocrit 41.1      MCV 91      MCH 30.1      MCHC 33.1      RDW 13.1      Platelet Count 287      % Neutrophils 56      % Lymphocytes 31      % Monocytes 10      % Eosinophils 3      % Basophils 0      % Immature Granulocytes 0      NRBCs per 100 WBC 0      Absolute Neutrophils 3.8      Absolute Lymphocytes 2.1      Absolute Monocytes 0.7      Absolute Eosinophils 0.2      Absolute Basophils 0.0      Absolute Immature Granulocytes 0.0      Absolute NRBCs 0.0     GLUCOSE MONITOR NURSING POCT   TROPONIN T, HIGH SENSITIVITY        CTA Head Neck with Contrast   Preliminary Result   IMPRESSION:    1. Patent arteries in the neck without evidence of dissection. Mild   atherosclerotic disease in the carotid arteries right worse than left   without significant stenosis by NASCET criteria.   2. Patent proximal major intracranial arteries without vascular   cutoff. No significant stenosis. No aneurysm identified.    3. Presumed atherosclerotic disease at the aortic arch and origins of   the great vessels without significant stenosis.      Results discussed with Lupillo Puckett at 10:58 AM on 7/5/2023.       CT Head w/o Contrast   Final Result   IMPRESSION: No evidence of acute intracranial hemorrhage, mass, or   herniation.         ROGELIO RIVAS MD            SYSTEM ID:  [id number]      XR Chest 2 Views    (Results Pending)   MR Brain w/o & w Contrast    (Results Pending)       Treatments provided: See MAR  Family Comments: wife at bedside  OBS brochure/video discussed/provided to patient:  No  ED Medications:   Medications   aspirin (ASA) chewable tablet 243 mg (has no administration in time range)   gadobutrol (GADAVIST) injection 9 mL (has no administration in time range)   0.9% sodium chloride BOLUS (0 mLs Intravenous Stopped 7/5/23 1127)   iopamidol (ISOVUE-370) solution 500 mL (75 mLs Intravenous $Given 7/5/23 1045)   aspirin (ASA) tablet 325 mg (325 mg Oral Not  Given 7/5/23 1129)   clopidogrel (PLAVIX) tablet 300 mg (300 mg Oral $Given 7/5/23 1129)       Drips infusing:  No  For the majority of the shift this patient was Green.   Interventions performed were NA.    Sepsis treatment initiated: No    Cares/treatment/interventions/medications to be completed following ED care: NA      ED Nurse Name: Maria Isabel Patel RN  12:09 PM  RECEIVING UNIT ED HANDOFF REVIEW    Above ED Nurse Handoff Report was reviewed: Yes  Reviewed by: Kimberly Martin RN on July 5, 2023 at 3:01 PM

## 2023-07-05 NOTE — ED TRIAGE NOTES
Sudden onset of dizziness while out walking 45 minutes PTA in ED.  Vision was blurry. Dizziness is better now but not resolved.   Triage Assessment     Row Name 07/05/23 1022       Triage Assessment (Adult)    Airway WDL WDL       Respiratory WDL    Respiratory WDL WDL       Skin Circulation/Temperature WDL    Skin Circulation/Temperature WDL WDL       Cardiac WDL    Cardiac WDL WDL       Peripheral/Neurovascular WDL    Peripheral Neurovascular WDL WDL       Cognitive/Neuro/Behavioral WDL    Cognitive/Neuro/Behavioral WDL --  sudden onset of dizziness 45 minutes ago

## 2023-07-05 NOTE — ED PROVIDER NOTES
"    History     Chief Complaint:  Dizziness       HPI   Francisco Miguel is a 68 year old male history of high cholesterol, prediabetes prior TIA came in after 1 hour prior to arrival had sudden onset dizziness while walking and decreased coordination with the right arm and weakness in the right arm and right leg.  Felt slightly off balance with walking but was able to walk.  No slurred speech.  No sensory deficit or paresthesias.  No head trauma, fever, or vomiting.  No headache.  Mild neck soreness.  Mild shortness of breath but no chest pain or abdominal pain.  Was well prior to this.  Denies tobacco, alcohol, and drug use.  States it feels somewhat similar to his prior TIA.  Not on aspirin or anticoagulation.    Independent Historian:    none    Review of External Notes:  June 8, 2023, [facility] clinic note.    Medications:    atorvastatin (LIPITOR) 10 MG tablet  doxazosin (CARDURA) 1 MG tablet  finasteride (PROSCAR) 5 MG tablet  fish oil-omega-3 fatty acids 1000 MG capsule  metFORMIN (GLUCOPHAGE XR) 500 MG 24 hr tablet  EPINEPHrine (ANY BX GENERIC EQUIV) 0.3 MG/0.3ML injection 2-pack        Past Medical History:    Past Medical History:   Diagnosis Date     Diabetes (H)      High cholesterol      TIA (transient ischemic attack)        Past Surgical History:    Past Surgical History:   Procedure Laterality Date     ORTHOPEDIC SURGERY            Physical Exam     Patient Vitals for the past 24 hrs:   BP Temp Temp src Pulse Resp SpO2 Height Weight   07/05/23 1052 133/76 -- -- 67 23 95 % -- --   07/05/23 1021 (!) 144/80 98.7  F (37.1  C) Temporal 82 20 96 % 1.727 m (5' 8\") 86.2 kg (190 lb)        Physical Exam  GENERAL: Patient well-appearing and in no acute distress.  HEAD: Atraumatic.  Eyes: Anicteric  NOSE: No active bleeding  MOUTH: Moist mucosa  THROAT: Patent airway.   Neck: No rigidity  CV: RRR, no murmurs rubs or gallops  PULM: CTAB with good aeration; no retractions, rales, rhonchi, or wheezing  ABD: Soft, " nontender, nondistended, no guarding, no peritoneal signs   DERM: No rash. Skin warm and dry  EXTREMITY: Moving all extremities without difficulty.   VASCULAR: Symmetric pulses bilaterally  NEURO: A,Ox3. CN 2-12 fully intact. Strength 5/5 left upper and lower extremity.  4+ out of 5 strength throughout right upper and right lower extremity.  No visual field cut.  No pronator drift.  Sensation fully intact to light touch symmetrically bilateral LE/UE. Normal finger-to-nose and heel to shin.       Emergency Department Course   ECG  ECG interpreted by me.  Time 1054  NSR at 71. No ST elevation or depression. Normal intervals. Normal axis.   No evidence of WPW, Brugada, HOCM, ARVD, ASD, or Wellen's.       Imaging:  MR Brain w/o & w Contrast   Final Result   IMPRESSION:     1. No evidence of acute infarct, mass, hemorrhage, or herniation.   2. A few nonspecific white matter changes likely due to chronic   microvascular ischemic disease.         ROGELIO RIVAS MD            SYSTEM ID:  [id number]      XR Chest 2 Views   Final Result   IMPRESSION: No acute findings. The lungs are clear and there are no   pleural effusions. Normal heart size.      NADIRA JOSEPH MD            SYSTEM ID:  [id number]      CTA Head Neck with Contrast   Final Result   IMPRESSION:    1. Patent arteries in the neck without evidence of dissection. Mild   atherosclerotic disease in the carotid arteries right worse than left   without significant stenosis by NASCET criteria.   2. Patent proximal major intracranial arteries without vascular   cutoff. No significant stenosis. No aneurysm identified.    3. Presumed atherosclerotic disease at the aortic arch and origins of   the great vessels without significant stenosis.      Results discussed with Lupillo Puckett at 10:58 AM on 7/5/2023.       ROGELIO RIVAS MD            SYSTEM ID:  [id number]      CT Head w/o Contrast   Final Result   IMPRESSION: No evidence of acute intracranial hemorrhage, mass, or    herniation.         ROGELIO RIVAS MD            SYSTEM ID:  [id number]        Report per radiology    Laboratory:  Labs Ordered and Resulted from Time of ED Arrival to Time of ED Departure   GLUCOSE BY METER - Abnormal       Result Value    GLUCOSE BY METER POCT 111 (*)    BASIC METABOLIC PANEL - Normal    Sodium 138      Potassium 4.3      Chloride 104      Carbon Dioxide (CO2) 25      Anion Gap 9      Urea Nitrogen 15.7      Creatinine 0.88      Calcium 9.6      Glucose 99      GFR Estimate >90     INR - Normal    INR 0.95     PARTIAL THROMBOPLASTIN TIME - Normal    aPTT 28     TROPONIN T, HIGH SENSITIVITY - Normal    Troponin T, High Sensitivity 13     GLUCOSE MONITOR NURSING POCT   CBC WITH PLATELETS AND DIFFERENTIAL    WBC Count 6.8      RBC Count 4.52      Hemoglobin 13.6      Hematocrit 41.1      MCV 91      MCH 30.1      MCHC 33.1      RDW 13.1      Platelet Count 287      % Neutrophils 56      % Lymphocytes 31      % Monocytes 10      % Eosinophils 3      % Basophils 0      % Immature Granulocytes 0      NRBCs per 100 WBC 0      Absolute Neutrophils 3.8      Absolute Lymphocytes 2.1      Absolute Monocytes 0.7      Absolute Eosinophils 0.2      Absolute Basophils 0.0      Absolute Immature Granulocytes 0.0      Absolute NRBCs 0.0     TROPONIN T, HIGH SENSITIVITY          Emergency Department Course & Assessments:             Interventions:  Medications   0.9% sodium chloride BOLUS (0 mLs Intravenous Stopped 7/5/23 1127)   iopamidol (ISOVUE-370) solution 500 mL (75 mLs Intravenous $Given 7/5/23 1045)   aspirin (ASA) tablet 325 mg (325 mg Oral Not Given 7/5/23 1129)   clopidogrel (PLAVIX) tablet 300 mg (300 mg Oral $Given 7/5/23 1129)   aspirin (ASA) chewable tablet 243 mg (243 mg Oral $Given 7/5/23 1250)   gadobutrol (GADAVIST) injection 9 mL (9 mLs Intravenous $Given 7/5/23 1235)          Independent Interpretation (X-rays, CTs, rhythm strip):  CXR unremarkable.    Consultations/Discussion of Management or  Tests:  Neurology Dr. Edmonds    Social Determinants of Health affecting care:  none     Disposition:  The patient was admitted to the hospital under the care of Dr. Fuentes.     Impression & Plan      Medical Decision Making:  Symptoms initially concerning for ischemic stroke.  Chronic condition complicating - prior TIA, HLD  DDx considered, but not limited to intracranial bleed, vascular dissection, metabolic abnormality, meningitis.  POC glucose unremarkable.  Activated code stroke.  Prompt CT head and CTA head/neck negative for acute process.  NIH stroke scale 0 after CT imaging  Discussed with neurologist   MRI brain unremarkable.  Given 3 baby aspirin, patient already took 1 baby aspirin this morning and 300mg clopidogrel  Discussed with hospitalist Dr. Gina catalan admit.  Patient admitted.      Critical Care time:  was 0 minutes for this patient excluding procedures.    Diagnosis:    ICD-10-CM    1. Dizziness  R42       2. Right sided weakness  R53.1       3. Shortness of breath  R06.02            Discharge Medications:  New Prescriptions    No medications on file          Lupillo Puckett MD  7/5/2023   Lupillo Puckett MD Foss, Kevin, MD  07/05/23 0639

## 2023-07-06 ENCOUNTER — APPOINTMENT (OUTPATIENT)
Dept: CARDIOLOGY | Facility: CLINIC | Age: 68
End: 2023-07-06
Attending: PHYSICIAN ASSISTANT
Payer: COMMERCIAL

## 2023-07-06 VITALS
HEART RATE: 80 BPM | SYSTOLIC BLOOD PRESSURE: 122 MMHG | RESPIRATION RATE: 18 BRPM | BODY MASS INDEX: 28.1 KG/M2 | WEIGHT: 185.4 LBS | TEMPERATURE: 97.8 F | DIASTOLIC BLOOD PRESSURE: 63 MMHG | HEIGHT: 68 IN | OXYGEN SATURATION: 96 %

## 2023-07-06 PROBLEM — G45.9 TIA (TRANSIENT ISCHEMIC ATTACK): Status: ACTIVE | Noted: 2023-07-06

## 2023-07-06 PROBLEM — Z86.73 HISTORY OF TIA (TRANSIENT ISCHEMIC ATTACK) AND STROKE: Status: ACTIVE | Noted: 2023-07-06

## 2023-07-06 LAB
GLUCOSE BLDC GLUCOMTR-MCNC: 103 MG/DL (ref 70–99)
GLUCOSE BLDC GLUCOMTR-MCNC: 87 MG/DL (ref 70–99)
GLUCOSE BLDC GLUCOMTR-MCNC: 93 MG/DL (ref 70–99)

## 2023-07-06 PROCEDURE — 250N000013 HC RX MED GY IP 250 OP 250 PS 637: Performed by: PHYSICIAN ASSISTANT

## 2023-07-06 PROCEDURE — 93306 TTE W/DOPPLER COMPLETE: CPT | Mod: 26 | Performed by: INTERNAL MEDICINE

## 2023-07-06 PROCEDURE — 99239 HOSP IP/OBS DSCHRG MGMT >30: CPT | Performed by: PHYSICIAN ASSISTANT

## 2023-07-06 PROCEDURE — 93306 TTE W/DOPPLER COMPLETE: CPT

## 2023-07-06 PROCEDURE — G0378 HOSPITAL OBSERVATION PER HR: HCPCS

## 2023-07-06 PROCEDURE — 93272 ECG/REVIEW INTERPRET ONLY: CPT | Performed by: INTERNAL MEDICINE

## 2023-07-06 PROCEDURE — G0426 INPT/ED TELECONSULT50: HCPCS | Mod: G0 | Performed by: PHYSICIAN ASSISTANT

## 2023-07-06 PROCEDURE — 93270 REMOTE 30 DAY ECG REV/REPORT: CPT

## 2023-07-06 RX ORDER — ATORVASTATIN CALCIUM 20 MG/1
20 TABLET, FILM COATED ORAL DAILY
Status: DISCONTINUED | OUTPATIENT
Start: 2023-07-07 | End: 2023-07-06 | Stop reason: HOSPADM

## 2023-07-06 RX ORDER — ASPIRIN 81 MG/1
81 TABLET ORAL DAILY
Qty: 30 TABLET | Refills: 0 | COMMUNITY
Start: 2023-07-07

## 2023-07-06 RX ADMIN — METFORMIN ER 500 MG 500 MG: 500 TABLET ORAL at 08:47

## 2023-07-06 RX ADMIN — CLOPIDOGREL BISULFATE 75 MG: 75 TABLET ORAL at 08:28

## 2023-07-06 RX ADMIN — ASPIRIN 81 MG: 81 TABLET, COATED ORAL at 08:28

## 2023-07-06 RX ADMIN — SENNOSIDES AND DOCUSATE SODIUM 1 TABLET: 8.6; 5 TABLET ORAL at 08:47

## 2023-07-06 RX ADMIN — ATORVASTATIN CALCIUM 10 MG: 10 TABLET, FILM COATED ORAL at 08:28

## 2023-07-06 ASSESSMENT — ACTIVITIES OF DAILY LIVING (ADL)
ADLS_ACUITY_SCORE: 31

## 2023-07-06 NOTE — CONSULTS
[facility]    Stroke Consult Note    Reason for Consult:  Possible stroke or TIA    Chief Complaint: Dizziness       HPI  Francisco Miguel is a 68 year old male with past medical history of HLD, pre-DM, Prior TIA in 2007 in [place].  On PTA Lipitor 10 mg daily, not on PTA aspirin.    He presented to the [facility] emergency department 7/5/2023 due to dizziness, decreased coordination R arm and right arm and leg weakness.  /80.  He started on aspirin and Plavix and admitted for stroke work-up.    He was seen today via telemedicine video exam for inpatient consult.  He describes event yesterday as he was walking his dog around the park and got [employer] around and then suddenly felt dizziness (described as lightheadedness not room spinning) and short of breath leading him to feel off balance.  He was able to walk back to his home with his wife.  He did not have any other symptoms at that time.  Denies any incoordination or weakness on the way back home.  On arrival home he felt that his right arm felt slightly heavier than usual but he is unsure if it was truly weak.  He had no numbness or tingling.  He did feel bilateral blurred vision.  Symptoms lasted approximately 2-2.5 hours and then completely resolved.  He felt like his symptoms were resolved in the ED.    We discussed his prior TIA and at that time he had difficulty with speech.  He was on aspirin for a while after and then when he moved here he had followed up with cardiology and was directed to stop taking aspirin.  His blood pressures have been well controlled with SBP usually in 120s.  He is very active and exercises 4 times weekly.  Other vascular risk factors appear well controlled.  LDL just slightly above goal so we discussed increasing his Lipitor dose.  He should return on baby aspirin given history of prior TIA and this episode of questionable TIA.  We will send home with a 30-day heart monitor to ensure no arrhythmias that  could lead to the symptoms he experienced yesterday.  He denies any fever, night sweats, or unintentional weight loss.  He has sleep apnea and uses CPAP nightly.  Former smoker but quit in 2015.    TIA Evaluation Summarized    MRI and/or Head CT  MRI: Negative for acute infarct, chronic SVID  CTh: Negative for acute pathology   Intracranial Vasculature  CTA head: Unremarkable   Cervical Vasculature  CTA neck: Presumed atherosclerotic disease aortic arch and origins of great vessels without significant stenosis     Echocardiogram  TTE: LV normal, RV normal, normal bilateral atria, SR   EKG/Telemetry  NSR   Other Testing Not Applicable      LDL 7/5/2023: 73 mg/dL   A1C  [facility] 6/8/2023 5.6%       ABCD2 Patients Score   Age ? 60 years 1 point 1   Blood Pressure    SBP ? 140 or DBP ?  90    1 point 1   Clinical Features    - Unilateral weakness    - Speech disturbance w/o weakness    - Other    2 points  1 point    0 points 2   Duration of symptoms    ? 60 minutes    10-59 minutes    < 10 minutes   2 points  1 point  0 points 2   Diabetes  1 point 0   Patient s ABCD2 Score (0-7) = 6       Impression  Transient dizziness/lightheadedness, shortness of breath, gait instability, and questionable slight R arm weakness, lower suspicion but possible transient ischemic attack (TIA), ABCD2 score 6, possibly secondary to small vessel disease given vascular risk factors although they are well controlled, rule out cardioembolic etiology including arrhythmias that could cause dizziness/lightheadedness    History of TIA 2007 in [place]    Recommendations    Acute Stroke Management:  -Neuro checks and vitals every 4 hours  - Inpatient SBP goal <180  -Aspirin 81 mg daily indefinitely  -Given questionable symptoms would not add additional Plavix at this time  -Increase PTA Lipitor to 20 mg daily  -telemetry, 30 day CardioNet monitoring at discharge if no Afib found on telemetry (ordered)  -PT/OT/SPT  -Euthermia, euglycemia,  "eunatremia  -Stroke Education  -Stroke Class per [facility]    Secondary stroke prevention:  -follow-up with PCP for titration to goal LDL 40-70, <40 increases risk of Intracranial hemorrhage  -Mediterranean diet can be beneficial for overall decreased cardiovascular risk, please print hand out for patient at discharge   -goal HgbA1c <7% for secondary stroke prevention, follow-up with PCP  -long term outpatient blood pressure goal <130/80, recommend home monitoring twice daily in AM and PM, keep log and bring to PCP follow-up      Patient Follow-up    -Follow-up with PCP in 1-2 weeks  -Follow-up with neurology team in 6-8 weeks (ordered)    Thank you for this consult. No further stroke evaluation is recommended, so we will sign off. Please contact us with any additional questions.     Georgie Atkins PA-C  Vascular Neurology    To page me or covering stroke neurology team member, click here: [facility]  Choose \"On Call\" tab at top, then select \"NEUROLOGY/ALL SITES\" from middle drop-down box, press Enter, then look for \"stroke\" or \"telestroke\" for your site.    _____________________________________________________    Clinically Significant Risk Factors Present on Admission               # Hypertension: Home medication list includes antihypertensive(s)      # Overweight: Estimated body mass index is 28.19 kg/m  as calculated from the following:    Height as of this encounter: 1.727 m (5' 8\").    Weight as of this encounter: 84.1 kg (185 lb 6.4 oz).            Past Medical History    Past Medical History:   Diagnosis Date     Diabetes (H)      High cholesterol      TIA (transient ischemic attack)      Medications   Home Meds  Prior to Admission medications    Medication Sig Start Date End Date Taking? Authorizing Provider   atorvastatin (LIPITOR) 10 MG tablet Take 10 mg by mouth daily   Yes Reported, Patient   doxazosin (CARDURA) 1 MG tablet Take 4 mg by mouth At Bedtime   Yes Reported, Patient "   finasteride (PROSCAR) 5 MG tablet Take 5 mg by mouth daily   Yes Unknown, Entered By History   fish oil-omega-3 fatty acids 1000 MG capsule Take 2 g by mouth daily   Yes Reported, Patient   metFORMIN (GLUCOPHAGE XR) 500 MG 24 hr tablet Take 500 mg by mouth every morning   Yes Unknown, Entered By History   EPINEPHrine (ANY BX GENERIC EQUIV) 0.3 MG/0.3ML injection 2-pack Inject 0.3 mLs (0.3 mg) into the muscle once as needed for anaphylaxis May repeat one time in 5-15 minutes if response to initial dose is inadequate. 6/20/23   Alejandrina Pacheco PA-C       Scheduled Meds    aspirin  81 mg Oral Daily    Or     aspirin  81 mg Oral or NG Tube Daily     [START ON 7/7/2023] atorvastatin  20 mg Oral Daily     doxazosin  4 mg Oral At Bedtime     metFORMIN  500 mg Oral QAM       Infusion Meds      Allergies   Allergies   Allergen Reactions     Ciprofloxacin Angioedema     Lip swelling, throat fullness, nausea.       Metronidazole Angioedema     Lip swelling, throat fullness, nausea          PHYSICAL EXAMINATION   Temp:  [97.6  F (36.4  C)-98  F (36.7  C)] 97.8  F (36.6  C)  Pulse:  [58-80] 80  Resp:  [18] 18  BP: (109-142)/(63-79) 122/63  SpO2:  [94 %-97 %] 96 %    General Exam  General:  patient lying in bed without any acute distress    HEENT:  normocephalic/atraumatic  Pulmonary:  no respiratory distress    Neuro Exam  Mental Status:  alert, oriented x 3, follows commands, speech clear and fluent, naming and repetition normal  Cranial Nerves: Reports chronic left eye lazy,  visual fields intact (tested by nurse), EOMI with normal smooth pursuit, facial sensation intact and symmetric (tested by nurse), facial movements symmetric, hearing not formally tested but intact to conversation, no dysarthria, tongue protrusion midline  Motor:  no abnormal movements, able to move all limbs antigravity spontaneously with no signs of hemiparesis observed, no pronator drift   Reflexes:  unable to test (telestroke)  Sensory:  light  touch sensation intact and symmetric throughout upper and lower extremities (assessed by nurse), no extinction on double simultaneous stimulation (assessed by nurse)  Coordination:  normal finger-to-nose and heel-to-shin bilaterally without dysmetria  Station/Gait:  unable to test due to telestroke    Stroke Scales    NIHSS  1a. Level of Consciousness 0-->Alert, keenly responsive   1b. LOC Questions 0-->Answers both questions correctly   1c. LOC Commands 0-->Performs both tasks correctly   2.   Best Gaze 0-->Normal   3.   Visual 0-->No visual loss   4.   Facial Palsy 0-->Normal symmetrical movements   5a. Motor Arm, Left 0-->No drift, limb holds 90 (or 45) degrees for full 10 secs   5b. Motor Arm, Right 0-->No drift, limb holds 90 (or 45) degrees for full 10 secs   6a. Motor Leg, Left 0-->No drift, leg holds 30 degree position for full 5 secs   6b. Motor Leg, right 0-->No drift, leg holds 30 degree position for full 5 secs   7.   Limb Ataxia 0-->Absent   8.   Sensory 0-->Normal, no sensory loss   9.   Best Language 0-->No aphasia, normal   10. Dysarthria 0-->Normal   11. Extinction and Inattention  0-->No abnormality   Total 0 (07/06/23 1005)       Imaging  I personally reviewed all imaging; relevant findings per HPI.    Labs Data   CBC  Recent Labs   Lab 07/05/23  1033   WBC 6.8   RBC 4.52   HGB 13.6   HCT 41.1        Basic Metabolic Panel   Recent Labs   Lab 07/06/23  0835 07/06/23  0336 07/05/23  2356 07/05/23  1714 07/05/23  1033   NA  --   --   --   --  138   POTASSIUM  --   --   --   --  4.3   CHLORIDE  --   --   --   --  104   CO2  --   --   --   --  25   BUN  --   --   --   --  15.7   CR  --   --   --   --  0.88   * 93 87   < > 99   JOSE  --   --   --   --  9.6    < > = values in this interval not displayed.     Liver Panel  No results for input(s): PROTTOTAL, ALBUMIN, BILITOTAL, ALKPHOS, AST, ALT, BILIDIRECT in the last 168 hours.  INR    Recent Labs   Lab Test 07/05/23  1033   INR 0.95            Stroke Consult Data Data   Telestroke Service Details  (for non-emergent stroke consult with tele)  Video start time 07/06/23   1005   Video end time 07/06/23   1051   Type of service telemedicine diagnostic assessment of acute neurological changes   Reason telemedicine is appropriate patient requires assessment with a specialist for diagnosis and treatment of neurological symptoms   Mode of transmission secure interactive audio and video communication per Zechariah   Originating site (patient location) [facility]    Distant site (provider location) [facility]     I have personally spent a total of 70 minutes providing care today, time spent in reviewing medical records and reviewing tests, examining the patient and obtaining history, coordination of care, and discussion with the patient and/or family regarding diagnostic results, prognosis, symptom management, risks and benefits of management options, and development of plan of care. Greater than 50% was spent in counseling and coordination of care.

## 2023-07-06 NOTE — DISCHARGE SUMMARY
"[facility]  Hospitalist Discharge Summary      Date of Admission:  7/5/2023  Date of Discharge:  7/6/2023  1:50 PM  Discharging Provider: Demetra Benjamin PA-C  Discharge Service: Hospitalist Service    Discharge Diagnoses   TIA  Dizziness, SOB, blurry vision    Clinically Significant Risk Factors     # Overweight: Estimated body mass index is 28.19 kg/m  as calculated from the following:    Height as of this encounter: 1.727 m (5' 8\").    Weight as of this encounter: 84.1 kg (185 lb 6.4 oz).       Follow-ups Needed After Discharge   Follow-up Appointments     Follow-up and recommended labs and tests       Follow up with primary care provider, Sulaiman Perales, within 7 days   for hospital follow- up.  No follow up labs or test are needed.  Start baby aspirin  Consider increasing atorvastatin to 40 mg  30 day cardiac event monitor            Unresulted Labs Ordered in the Past 30 Days of this Admission     No orders found for last 31 day(s).      These results will be followed up by PCP    Discharge Disposition   Discharged to home  Condition at discharge: Stable    Hospital Course   Francisco Miguel is a 68 year old male with PMH significant for prediabetes, BPH, HLD and previous TIA who presents to the ED on 7/5/2023 for evaluation of dizziness as well decreased coordination of right arm along with weakness of right arm.     ED workup reveals: intermittently elevated BP, CBC unremarkable, BMP WNL, INR and PTT WNL, troponin of 13 with repeat of 10, CT of head negative for acute intracranial hemorrhage, mass, or herniation, CTA of head/neck shows patent arteries and neck without evidence of dissection, mild atherosclerotic disease in the carotid arteries (R>L), patent proximal major intracranial arteries without vascular cutoff, no significant stenosis, presumed atherosclerotic disease of the aortic arch and origins of the great vessels without significant stenosis, chest x-ray negative, EKG " shows rate of 71 bpm in sinus rhythm, and MRI brain negative for acute infarct, mass, hemorrhage, herniation, few nonspecific white matter changes likely due to chronic microvascular ischemic disease.     #Suspected TIA:   episode on 7/5 at 9:30 AM while walking of dizziness, bilateral blurry vision, unsteady gait, uncoordinated movement of right UE, and right UE weakness. No facial droop or speech changes per spouse that was with patient. Episode slowly resolved by the time he was interviewed for admission and felt his symptoms had almost completely resolved around 12-1 PM.   Slightly decreased strength of right UE per ED provider with no focal deficits on reassessment.   Similar episode in 2007 or 2008 when patient was diagnosed with TIA while residing in [facility].   Previously on ASA but taken off a few years prior.   CT of head, CTA of head/neck, and MRI of brain negative for acute pathology. Due to associated shortness of breath with episode a CXR was completed and negative. Based on presentation suspect his symptoms may of been a TIA with negative imaging.   -neuro checks stable  - MRI brain negative  -received Plavix 300 mg and  mg in ED based on neurology recommendation, recommend 81 mg ASA indefinitely  - echocardiogram unremarkable   -stroke neurology consulted, low suspicion for TIA. Recommended 30 day cardiac event monitor to assess for arrhythmia as possible cause of sx  -continue PTA Atorvastatin. Discuss increasing dose with PCP. Neurology recommends 20 mg, ideally get to 40 mg      #Elevated BP: BP slightly elevated in the 140/70s, no history of hypertension.   -monitor BP over next day with goal of <135/80    #Prediabetes: most recent HgbA1c of 5.6 on 6/19/2023 with initial blood glucose of 99.  -continue PTA Metformin     #HLD: continue PTA Atorvastatin, discuss dose increase with PCP    #BPH: resume PTA Finasteride and Doxazosin       Consultations This Hospital Stay   SMOKING CESSATION  PROGRAM IP CONSULT  NEUROLOGY IP STROKE CONSULT    Code Status   Full Code    Time Spent on this Encounter   I, Demetra Benjamin PA-C, personally saw the patient today and spent greater than 30 minutes discharging this patient.       Demetra Benjamin PA-C  [facility] OBSERVATION DEPT  [address] [place] [address]  Phone: [phone]  ______________________________________________________________________    Physical Exam   Vital Signs: Temp: 97.8  F (36.6  C) Temp src: Oral BP: 122/63 Pulse: 80   Resp: 18 SpO2: 96 % O2 Device: None (Room air)    Weight: 185 lbs 6.4 oz    GENERAL:  Comfortable.  PSYCH: pleasant, oriented, No acute distress.  HEART:  RRR  LUNGS:  Normal Respiratory effort.  EXTREMITIES:  Able to ambulate independently  SKIN:  Dry to touch, No rash, wound or ulcerations.  NEUROLOGIC:  Grossly intact         Primary Care Physician   Sulaiman Perales    Discharge Orders      Reason for your hospital stay    Dizziness, blurred vision and right arm heaviness, concerning for TIA vs CVA. MRI of the brain was negative for acute stroke or CVA. Echocardiogram was unremarkable. Unclear if your symptoms were related to TIA. Stroke Neurology consulted and recommended baby Aspirin and cardiac event monitor for further evaluation for possible arrhythmia.     Follow-up and recommended labs and tests     Follow up with primary care provider, Sulaiman Perales, within 7 days for hospital follow- up.  No follow up labs or test are needed.  Start baby aspirin  Consider increasing atorvastatin to 40 mg  30 day cardiac event monitor     Activity    Your activity upon discharge: activity as tolerated     When to contact your care team    Call your primary doctor if you have any of the following: temperature greater than 101, increased dizziness, or focal neurological symptom such as vision loss, facial droop, difficulty speaking or one sided weakness.     Diet    Follow this diet  upon discharge: Regular     Stroke Hospital Follow Up (for neurologist use only)    [facility] will call you to coordinate care as prescribed by your provider. If you don t hear from a representative within 2 business days, please call [phone].         Significant Results and Procedures   Most Recent 3 CBC's:Recent Labs   Lab Test 07/05/23  1033 06/20/23  0940 01/27/19  1212   WBC 6.8 6.7 8.8   HGB 13.6 12.4* 14.3   MCV 91 92 93    250 306     Most Recent 3 BMP's:Recent Labs   Lab Test 07/06/23  0835 07/06/23  0336 07/05/23  2356 07/05/23  1714 07/05/23  1033 07/05/23  1030 06/20/23  0940 01/27/19  1212   0000   NA  --   --   --   --  138  --  134* 139  --    POTASSIUM  --   --   --   --  4.3  --  3.9 4.1  --    CHLORIDE  --   --   --   --  104  --  101 108  --    CO2  --   --   --   --  25  --  22 26  --    BUN  --   --   --   --  15.7  --  10.2 15  --    CR  --   --   --   --  0.88  --  0.96 0.88  --    ANIONGAP  --   --   --   --  9  --  11 5  --    JOSE  --   --   --   --  9.6  --  8.4* 9.1  --    * 93 87   < > 99   < > 162* 96   < >    < > = values in this interval not displayed.     Most Recent 2 LFT's:Recent Labs   Lab Test 06/20/23  0940   AST 31   ALT 34   ALKPHOS 161*   BILITOTAL 0.2     Most Recent 3 Troponin's:Recent Labs   Lab Test 01/27/19  1601 01/27/19  1212   TROPI <0.015 <0.015     Most Recent 3 BNP's:No lab results found.  Most Recent D-dimer:No lab results found.  7-Day Micro Results     No results found for the last 168 hours.        Most Recent TSH and T4:No lab results found.  Most Recent Hemoglobin A1c:No lab results found.  Most Recent Urinalysis:No lab results found.,   Results for orders placed or performed during the hospital encounter of 07/05/23   CT Head w/o Contrast    Narrative    CT SCAN OF THE HEAD WITHOUT CONTRAST   7/5/2023 10:47 AM     HISTORY: Code stroke to evaluate for potential thrombolysis and  thrombectomy. Dizziness.    TECHNIQUE:  Axial images  of the head and coronal reformations without  IV contrast material. Radiation dose for this scan was reduced using  automated exposure control, adjustment of the mA and/or kV according  to patient size, or iterative reconstruction technique.    COMPARISON: None.    FINDINGS: There is no evidence of intracranial hemorrhage, mass, acute  infarct or anomaly. The ventricles are normal in size, shape and  configuration. The brain parenchyma and subarachnoid spaces are  normal.     Scattered mucosal thickening in the visualized paranasal sinuses. The  bony calvarium and bones of the skull base appear intact.       Impression    IMPRESSION: No evidence of acute intracranial hemorrhage, mass, or  herniation.      ROGELIO RIVAS MD         SYSTEM ID:  [id number]   CTA Head Neck with Contrast    Narrative    CT ANGIOGRAM OF THE HEAD AND NECK WITH CONTRAST  7/5/2023 10:49 AM     HISTORY: Code stroke to evaluate for potential thrombolysis and  thrombectomy. Dizziness.    TECHNIQUE: CT angiography with an injection of 75mL [id number]-[id number] IV with  scans through the head and neck. Images were transferred to a separate  3-D workstation where multiplanar reformations and 3-D images were  created. Estimates of carotid stenoses are made relative to the distal  internal carotid artery diameters except as noted. Radiation dose for  this scan was reduced using automated exposure control, adjustment of  the mA and/or kV according to patient size, or iterative  reconstruction technique.    COMPARISON: None.     CT HEAD FINDINGS: No contrast enhancing lesions. Cerebral blood flow  is grossly normal.     CT ANGIOGRAM HEAD FINDINGS:  The major intracranial arteries including  the proximal branches of the anterior cerebral, middle cerebral, and  posterior cerebral arteries appear patent without vascular cutoff. No  aneurysm identified. No significant stenosis. Venous circulation is  unremarkable.     CT ANGIOGRAM NECK FINDINGS:   Prominent soft  tissue is seen at the aortic arch and origins of the  great vessels. Mild calcification in the soft tissues at the aortic  arch. This may be due to atherosclerotic disease.    Right carotid artery: The right common and internal carotid arteries  are patent. Mild atherosclerotic disease at the carotid bifurcation  and proximal internal carotid artery without significant stenosis by  NASCET criteria.     Left carotid artery: The left common and internal carotid arteries are  patent. Mild atherosclerotic disease at the carotid bifurcation  without stenosis.     Vertebral arteries: Vertebral arteries are patent without evidence of  dissection. No significant stenosis.     Other findings: None.       Impression    IMPRESSION:   1. Patent arteries in the neck without evidence of dissection. Mild  atherosclerotic disease in the carotid arteries right worse than left  without significant stenosis by NASCET criteria.  2. Patent proximal major intracranial arteries without vascular  cutoff. No significant stenosis. No aneurysm identified.   3. Presumed atherosclerotic disease at the aortic arch and origins of  the great vessels without significant stenosis.    Results discussed with Lupillo Puckett at 10:58 AM on 7/5/2023.     ROGELIO RIVAS MD         SYSTEM ID:  [id number]   XR Chest 2 Views    Narrative    XR CHEST 2 VIEWS 7/5/2023 12:02 PM    HISTORY: Shortness of breath    COMPARISON: X-ray 1/27/2019      Impression    IMPRESSION: No acute findings. The lungs are clear and there are no  pleural effusions. Normal heart size.    NADIRA JOSEPH MD         SYSTEM ID:  [id number]   MR Brain w/o & w Contrast    Narrative    MRI BRAIN WITHOUT AND WITH CONTRAST  7/5/2023 12:34 PM     HISTORY: Right-sided weakness.     TECHNIQUE: Multiplanar, multisequence MRI of the brain without and  with 9 mL Gadavist     COMPARISON: Head CT from earlier the same day.     FINDINGS: There is no evidence of acute infarct, hemorrhage, mass,  or  herniation. A few subcortical white matter T2 hyperintensities which  are nonspecific, but likely related to chronic microvascular ischemic  disease. Ventricular size within normal limits without hydrocephalus.     There is no abnormal intracranial enhancement.     Scattered mucosal thickening in the paranasal sinuses. The major  arterial T2 flow voids at the base of the brain appear patent.       Impression    IMPRESSION:    1. No evidence of acute infarct, mass, hemorrhage, or herniation.  2. A few nonspecific white matter changes likely due to chronic  microvascular ischemic disease.      ROGELIO RIVAS MD         SYSTEM ID:  [id number]   Echocardiogram Complete    Narrative    [id number]  [id number]  [id number]  [id number]^CHRISTOPHER^NORBERTO^CHADD     [facility]  Echocardiography Laboratory  [address] [place] [address]     Name: GIOVANNA CALLE  MRN: [id number]  : 1955  Study Date: 2023 10:04 AM  Age: 68 yrs  Gender: Male  Patient Location: [facility]  Reason For Study: TIA  Ordering Physician: NORBERTO WHITE  Referring Physician: Sulaiman Perales  Performed By: Dakota Zacarias RDCS     BSA: 2.0 m2  Height: 68 in  Weight: 190 lb  HR: 77  BP: 142/79 mmHg  ______________________________________________________________________________  Procedure  Complete Portable Echo Adult.  ______________________________________________________________________________  Interpretation Summary     No cardiac source of emboli noted.  ______________________________________________________________________________  Left Ventricle  The left ventricle is normal in structure, function and size. Left ventricular  diastolic function is normal.     Right Ventricle  The right ventricle is normal in structure, function and size.     Atria  Normal left atrial size. Right atrial size is normal.     Mitral Valve  The mitral valve is normal in structure and function.     Tricuspid Valve  Normal tricuspid valve.     Aortic  Valve  The aortic valve is normal in structure and function.     Pulmonic Valve  Normal pulmonic valve. There is trace pulmonic valvular regurgitation.     Vessels  The aortic root is normal size. Normal size ascending aorta. The inferior vena  cava is normal.     Pericardium  There is no pericardial effusion.     Rhythm  Sinus rhythm was noted.  ______________________________________________________________________________  MMode/2D Measurements & Calculations  IVSd: 0.76 cm     LVIDd: 5.7 cm  LVIDs: 3.9 cm  LVPWd: 0.86 cm  IVC diam: 1.3 cm  FS: 31.2 %  LV mass(C)d: 173.4 grams  LV mass(C)dI: 86.7 grams/m2  Ao root diam: 3.4 cm  asc Aorta Diam: 3.2 cm  LVOT diam: 2.3 cm  LVOT area: 4.2 cm2  LA Volume (BP): 53.6 ml  LA Volume Index (BP): 26.8 ml/m2  RWT: 0.30  TAPSE: 2.0 cm     Time Measurements  Aortic HR: 77.0 BPM     Doppler Measurements & Calculations  MV E max antonio: 59.6 cm/sec  MV A max antonio: 80.6 cm/sec  MV E/A: 0.74  MV max PG: 3.0 mmHg  MV mean P.0 mmHg  MV V2 VTI: 18.8 cm  MVA(VTI): 3.6 cm2  MV dec time: 0.23 sec  LV V1 max P.8 mmHg  LV V1 max: 84.0 cm/sec  LV V1 VTI: 16.4 cm  CO(LVOT): 5.2 l/min  CI(LVOT): 2.6 l/min/m2  SV(LVOT): 68.1 ml  SI(LVOT): 34.1 ml/m2  PA acc time: 0.11 sec  TR max antonio: 248.0 cm/sec  TR max P.6 mmHg  E/E' av.1  Lateral E/e': 6.6  Medial E/e': 7.5  RV S Antonio: 18.5 cm/sec     ______________________________________________________________________________  Report approved by: Melia Hernandez 2023 10:42 AM               Discharge Medications   Current Discharge Medication List      START taking these medications    Details   aspirin 81 MG EC tablet Take 1 tablet (81 mg) by mouth daily  Qty: 30 tablet, Refills: 0    Associated Diagnoses: TIA (transient ischemic attack); History of TIA (transient ischemic attack) and stroke         CONTINUE these medications which have NOT CHANGED    Details   atorvastatin (LIPITOR) 10 MG tablet Take 10 mg by mouth daily      doxazosin  (CARDURA) 1 MG tablet Take 4 mg by mouth At Bedtime      finasteride (PROSCAR) 5 MG tablet Take 5 mg by mouth daily      fish oil-omega-3 fatty acids 1000 MG capsule Take 2 g by mouth daily      metFORMIN (GLUCOPHAGE XR) 500 MG 24 hr tablet Take 500 mg by mouth every morning      EPINEPHrine (ANY BX GENERIC EQUIV) 0.3 MG/0.3ML injection 2-pack Inject 0.3 mLs (0.3 mg) into the muscle once as needed for anaphylaxis May repeat one time in 5-15 minutes if response to initial dose is inadequate.  Qty: 2 each, Refills: 0           Allergies   Allergies   Allergen Reactions     Ciprofloxacin Angioedema     Lip swelling, throat fullness, nausea.       Metronidazole Angioedema     Lip swelling, throat fullness, nausea

## 2023-07-06 NOTE — PROGRESS NOTES
Earlier on in the morning,pt. was playing with his PIV. PIV was edematous, and looked somewhat sore.RN looked all over room and surrounding areas.Pt. offered no c/p pain or anything else. Had a meeting with [facility] person.patient. was evidently deemed sound.RN was called into room quite some time.Pt. informed RN that he was cleared to discharge today. Orders complete,RN reviewed orders with pt.and pulled IV  For pt. Pt. Signed orders and is waiting for his wife to pick him up.wife came to  pt. And safely take him home.

## 2023-07-06 NOTE — UTILIZATION REVIEW
"  Admission Status; Secondary Review Determination         Under the authority of the Utilization Management Committee, the utilization review process indicated a secondary review on the above patient.  The review outcome is based on review of the medical records, discussions with staff, and applying clinical experience noted on the date of the review.          (x) Observation Status Appropriate - This patient does not meet hospital inpatient criteria and is placed in observation status. If this patient's primary payer is Medicare and was admitted as an inpatient, Condition Code 44 should be used and patient status changed to \"observation\".     RATIONALE FOR DETERMINATION   68-year-old male with a history of prediabetes, BPH, HLD, and a previous TIA presents to the ED with dizziness, decreased coordination of the right arm, and weakness in the right arm. Initial workup reveals intermittently elevated BP, unremarkable CBC and BMP, normal coagulation studies, troponin elevation, negative head CT, CTA of head/neck showing mild atherosclerotic disease, negative chest x-ray, normal EKG, and negative MRI brain. Suspected TIA based on symptoms and previous history. Serial neuro checks performed, and the patient received Plavix and ASA in the ED. Neurology recommended Plavix 75 mg daily with ASA 81 mg for 21 days, followed by ASA 81 mg indefinitely. Stroke neurology consult obtained, Atorvastatin continued, and updated lipid panel ordered. Echocardiogram without bubble requested. No deficits noted, and telemetry monitoring initiated. BP to be monitored with a goal of <135/80.  The severity of illness, intensity of service provided, expected LOS and risk for adverse outcome make the care appropriate for further observation; however, doesn't meet criteria for hospital inpatient admission. KATIE Benjamin notified of this determination.    This document was produced using voice recognition software.      The information on this " document is developed by the utilization review team in order for the business office to ensure compliance.  This only denotes the appropriateness of proper admission status and does not reflect the quality of care rendered.         The definitions of Inpatient Status and Observation Status used in making the determination above are those provided in the CMS Coverage Manual, Chapter 1 and Chapter 6, section 70.4.      Sincerely,     SEEMA CHACON MD    System Medical Director  Utilization Management  [facility].

## 2023-07-06 NOTE — PLAN OF CARE
"Care from [id number]         Inpatient Progress Note:  For complete assessment see flow sheet documentation.       /63 (BP Location: Left arm)   Pulse 62   Temp 98  F (36.7  C) (Oral)   Resp 18   Ht 1.727 m (5' 8\")   Wt 83.6 kg (184 lb 3.2 oz)   SpO2 97%   BMI 28.01 kg/m       Pt A&Ox4. VSS, pt uses CPAP at night. Indep. Tolerating regular diet and PO meds. Pt denies any pain or discomfort. Neuros intact, pt denies any numbness or tingling. Tele running SR 64. Neuro stroke consulted. Plan- q4 neuro checks, q4 BS check, monitor tele, echo.     Pablo Long RN       "

## 2023-08-28 ENCOUNTER — HOSPITAL ENCOUNTER (INPATIENT)
Facility: CLINIC | Age: 68
LOS: 3 days | Discharge: HOME | DRG: 872 | End: 2023-09-01
Attending: EMERGENCY MEDICINE | Admitting: INTERNAL MEDICINE
Payer: COMMERCIAL

## 2023-08-28 DIAGNOSIS — K57.20 DIVERTICULITIS OF COLON WITH PERFORATION: ICD-10-CM

## 2023-08-28 LAB
ALBUMIN SERPL BCG-MCNC: 3.8 G/DL (ref 3.5–5.2)
ALP SERPL-CCNC: 173 U/L (ref 40–129)
ALT SERPL W P-5'-P-CCNC: 27 U/L (ref 0–70)
ANION GAP SERPL CALCULATED.3IONS-SCNC: 11 MMOL/L (ref 7–15)
AST SERPL W P-5'-P-CCNC: 35 U/L (ref 0–45)
BASOPHILS # BLD AUTO: 0 10E3/UL (ref 0–0.2)
BASOPHILS NFR BLD AUTO: 0 %
BILIRUB SERPL-MCNC: 0.8 MG/DL
BUN SERPL-MCNC: 13.8 MG/DL (ref 8–23)
CALCIUM SERPL-MCNC: 9.2 MG/DL (ref 8.8–10.2)
CHLORIDE SERPL-SCNC: 98 MMOL/L (ref 98–107)
CREAT SERPL-MCNC: 1 MG/DL (ref 0.67–1.17)
DEPRECATED HCO3 PLAS-SCNC: 23 MMOL/L (ref 22–29)
EOSINOPHIL # BLD AUTO: 0.1 10E3/UL (ref 0–0.7)
EOSINOPHIL NFR BLD AUTO: 0 %
ERYTHROCYTE [DISTWIDTH] IN BLOOD BY AUTOMATED COUNT: 13.1 % (ref 10–15)
GFR SERPL CREATININE-BSD FRML MDRD: 82 ML/MIN/1.73M2
GLUCOSE SERPL-MCNC: 141 MG/DL (ref 70–99)
HCT VFR BLD AUTO: 40.1 % (ref 40–53)
HGB BLD-MCNC: 13.6 G/DL (ref 13.3–17.7)
IMM GRANULOCYTES # BLD: 0.1 10E3/UL
IMM GRANULOCYTES NFR BLD: 1 %
LIPASE SERPL-CCNC: 15 U/L (ref 13–60)
LYMPHOCYTES # BLD AUTO: 1.7 10E3/UL (ref 0.8–5.3)
LYMPHOCYTES NFR BLD AUTO: 9 %
MCH RBC QN AUTO: 30.3 PG (ref 26.5–33)
MCHC RBC AUTO-ENTMCNC: 33.9 G/DL (ref 31.5–36.5)
MCV RBC AUTO: 89 FL (ref 78–100)
MONOCYTES # BLD AUTO: 1 10E3/UL (ref 0–1.3)
MONOCYTES NFR BLD AUTO: 5 %
NEUTROPHILS # BLD AUTO: 16.9 10E3/UL (ref 1.6–8.3)
NEUTROPHILS NFR BLD AUTO: 85 %
NRBC # BLD AUTO: 0 10E3/UL
NRBC BLD AUTO-RTO: 0 /100
PLATELET # BLD AUTO: 317 10E3/UL (ref 150–450)
POTASSIUM SERPL-SCNC: 3.9 MMOL/L (ref 3.4–5.3)
PROT SERPL-MCNC: 7.3 G/DL (ref 6.4–8.3)
RBC # BLD AUTO: 4.49 10E6/UL (ref 4.4–5.9)
SODIUM SERPL-SCNC: 132 MMOL/L (ref 136–145)
WBC # BLD AUTO: 19.8 10E3/UL (ref 4–11)

## 2023-08-28 PROCEDURE — 83690 ASSAY OF LIPASE: CPT | Performed by: EMERGENCY MEDICINE

## 2023-08-28 PROCEDURE — 36415 COLL VENOUS BLD VENIPUNCTURE: CPT | Performed by: EMERGENCY MEDICINE

## 2023-08-28 PROCEDURE — 99285 EMERGENCY DEPT VISIT HI MDM: CPT

## 2023-08-28 PROCEDURE — 80053 COMPREHEN METABOLIC PANEL: CPT | Performed by: EMERGENCY MEDICINE

## 2023-08-28 PROCEDURE — 85025 COMPLETE CBC W/AUTO DIFF WBC: CPT | Performed by: EMERGENCY MEDICINE

## 2023-08-29 ENCOUNTER — APPOINTMENT (OUTPATIENT)
Dept: CT IMAGING | Facility: CLINIC | Age: 68
DRG: 872 | End: 2023-08-29
Attending: EMERGENCY MEDICINE
Payer: COMMERCIAL

## 2023-08-29 PROBLEM — K57.20 DIVERTICULITIS OF COLON WITH PERFORATION: Status: ACTIVE | Noted: 2023-08-29

## 2023-08-29 LAB
GLUCOSE BLDC GLUCOMTR-MCNC: 111 MG/DL (ref 70–99)
GLUCOSE BLDC GLUCOMTR-MCNC: 120 MG/DL (ref 70–99)
GLUCOSE BLDC GLUCOMTR-MCNC: 124 MG/DL (ref 70–99)
GLUCOSE BLDC GLUCOMTR-MCNC: 126 MG/DL (ref 70–99)
GLUCOSE BLDC GLUCOMTR-MCNC: 127 MG/DL (ref 70–99)
HOLD SPECIMEN: NORMAL
HOLD SPECIMEN: NORMAL
LACTATE SERPL-SCNC: 0.8 MMOL/L (ref 0.7–2)

## 2023-08-29 PROCEDURE — 83605 ASSAY OF LACTIC ACID: CPT | Performed by: EMERGENCY MEDICINE

## 2023-08-29 PROCEDURE — 74177 CT ABD & PELVIS W/CONTRAST: CPT

## 2023-08-29 PROCEDURE — 250N000013 HC RX MED GY IP 250 OP 250 PS 637: Performed by: INTERNAL MEDICINE

## 2023-08-29 PROCEDURE — 258N000003 HC RX IP 258 OP 636: Performed by: INTERNAL MEDICINE

## 2023-08-29 PROCEDURE — 96375 TX/PRO/DX INJ NEW DRUG ADDON: CPT

## 2023-08-29 PROCEDURE — 82962 GLUCOSE BLOOD TEST: CPT

## 2023-08-29 PROCEDURE — 250N000011 HC RX IP 250 OP 636: Performed by: EMERGENCY MEDICINE

## 2023-08-29 PROCEDURE — 250N000011 HC RX IP 250 OP 636: Mod: JZ | Performed by: EMERGENCY MEDICINE

## 2023-08-29 PROCEDURE — 96365 THER/PROPH/DIAG IV INF INIT: CPT | Mod: 59

## 2023-08-29 PROCEDURE — 250N000011 HC RX IP 250 OP 636: Mod: JZ | Performed by: INTERNAL MEDICINE

## 2023-08-29 PROCEDURE — 36415 COLL VENOUS BLD VENIPUNCTURE: CPT | Performed by: EMERGENCY MEDICINE

## 2023-08-29 PROCEDURE — 87040 BLOOD CULTURE FOR BACTERIA: CPT | Performed by: EMERGENCY MEDICINE

## 2023-08-29 PROCEDURE — 99223 1ST HOSP IP/OBS HIGH 75: CPT | Mod: AI | Performed by: INTERNAL MEDICINE

## 2023-08-29 PROCEDURE — 120N000001 HC R&B MED SURG/OB

## 2023-08-29 RX ORDER — FINASTERIDE 5 MG/1
5 TABLET, FILM COATED ORAL DAILY
Status: DISCONTINUED | OUTPATIENT
Start: 2023-08-29 | End: 2023-09-01 | Stop reason: HOSPADM

## 2023-08-29 RX ORDER — NICOTINE POLACRILEX 4 MG
15-30 LOZENGE BUCCAL
Status: DISCONTINUED | OUTPATIENT
Start: 2023-08-29 | End: 2023-09-01 | Stop reason: HOSPADM

## 2023-08-29 RX ORDER — SODIUM CHLORIDE 9 MG/ML
INJECTION, SOLUTION INTRAVENOUS CONTINUOUS
Status: DISCONTINUED | OUTPATIENT
Start: 2023-08-29 | End: 2023-09-01

## 2023-08-29 RX ORDER — DEXTROSE MONOHYDRATE 25 G/50ML
25-50 INJECTION, SOLUTION INTRAVENOUS
Status: DISCONTINUED | OUTPATIENT
Start: 2023-08-29 | End: 2023-08-29

## 2023-08-29 RX ORDER — HYDROMORPHONE HCL IN WATER/PF 6 MG/30 ML
0.2 PATIENT CONTROLLED ANALGESIA SYRINGE INTRAVENOUS
Status: DISCONTINUED | OUTPATIENT
Start: 2023-08-29 | End: 2023-09-01 | Stop reason: HOSPADM

## 2023-08-29 RX ORDER — PROCHLORPERAZINE MALEATE 5 MG
5 TABLET ORAL EVERY 6 HOURS PRN
Status: DISCONTINUED | OUTPATIENT
Start: 2023-08-29 | End: 2023-09-01 | Stop reason: HOSPADM

## 2023-08-29 RX ORDER — HYDROMORPHONE HYDROCHLORIDE 1 MG/ML
0.5 INJECTION, SOLUTION INTRAMUSCULAR; INTRAVENOUS; SUBCUTANEOUS ONCE
Status: COMPLETED | OUTPATIENT
Start: 2023-08-29 | End: 2023-08-29

## 2023-08-29 RX ORDER — NALOXONE HYDROCHLORIDE 0.4 MG/ML
0.2 INJECTION, SOLUTION INTRAMUSCULAR; INTRAVENOUS; SUBCUTANEOUS
Status: DISCONTINUED | OUTPATIENT
Start: 2023-08-29 | End: 2023-09-01 | Stop reason: HOSPADM

## 2023-08-29 RX ORDER — LIDOCAINE 40 MG/G
CREAM TOPICAL
Status: DISCONTINUED | OUTPATIENT
Start: 2023-08-29 | End: 2023-09-01 | Stop reason: HOSPADM

## 2023-08-29 RX ORDER — NALOXONE HYDROCHLORIDE 0.4 MG/ML
0.4 INJECTION, SOLUTION INTRAMUSCULAR; INTRAVENOUS; SUBCUTANEOUS
Status: DISCONTINUED | OUTPATIENT
Start: 2023-08-29 | End: 2023-09-01 | Stop reason: HOSPADM

## 2023-08-29 RX ORDER — HYDROMORPHONE HCL IN WATER/PF 6 MG/30 ML
0.4 PATIENT CONTROLLED ANALGESIA SYRINGE INTRAVENOUS
Status: DISCONTINUED | OUTPATIENT
Start: 2023-08-29 | End: 2023-08-31

## 2023-08-29 RX ORDER — IOPAMIDOL 755 MG/ML
500 INJECTION, SOLUTION INTRAVASCULAR ONCE
Status: COMPLETED | OUTPATIENT
Start: 2023-08-29 | End: 2023-08-29

## 2023-08-29 RX ORDER — ONDANSETRON 2 MG/ML
4 INJECTION INTRAMUSCULAR; INTRAVENOUS EVERY 6 HOURS PRN
Status: DISCONTINUED | OUTPATIENT
Start: 2023-08-29 | End: 2023-09-01 | Stop reason: HOSPADM

## 2023-08-29 RX ORDER — ONDANSETRON 4 MG/1
4 TABLET, ORALLY DISINTEGRATING ORAL EVERY 6 HOURS PRN
Status: DISCONTINUED | OUTPATIENT
Start: 2023-08-29 | End: 2023-09-01 | Stop reason: HOSPADM

## 2023-08-29 RX ORDER — ESCITALOPRAM OXALATE 5 MG/1
5 TABLET ORAL DAILY
COMMUNITY

## 2023-08-29 RX ORDER — DEXTROSE MONOHYDRATE 25 G/50ML
25-50 INJECTION, SOLUTION INTRAVENOUS
Status: DISCONTINUED | OUTPATIENT
Start: 2023-08-29 | End: 2023-09-01 | Stop reason: HOSPADM

## 2023-08-29 RX ORDER — PROCHLORPERAZINE 25 MG
12.5 SUPPOSITORY, RECTAL RECTAL EVERY 12 HOURS PRN
Status: DISCONTINUED | OUTPATIENT
Start: 2023-08-29 | End: 2023-09-01 | Stop reason: HOSPADM

## 2023-08-29 RX ORDER — PIPERACILLIN SODIUM, TAZOBACTAM SODIUM 4; .5 G/20ML; G/20ML
4.5 INJECTION, POWDER, LYOPHILIZED, FOR SOLUTION INTRAVENOUS ONCE
Status: COMPLETED | OUTPATIENT
Start: 2023-08-29 | End: 2023-08-29

## 2023-08-29 RX ORDER — DOXAZOSIN 1 MG/1
4 TABLET ORAL AT BEDTIME
Status: DISCONTINUED | OUTPATIENT
Start: 2023-08-29 | End: 2023-09-01 | Stop reason: HOSPADM

## 2023-08-29 RX ORDER — ATORVASTATIN CALCIUM 20 MG/1
20 TABLET, FILM COATED ORAL DAILY
COMMUNITY

## 2023-08-29 RX ORDER — PIPERACILLIN SODIUM, TAZOBACTAM SODIUM 4; .5 G/20ML; G/20ML
4.5 INJECTION, POWDER, LYOPHILIZED, FOR SOLUTION INTRAVENOUS EVERY 6 HOURS
Status: DISCONTINUED | OUTPATIENT
Start: 2023-08-29 | End: 2023-09-01

## 2023-08-29 RX ORDER — NICOTINE POLACRILEX 4 MG
15-30 LOZENGE BUCCAL
Status: DISCONTINUED | OUTPATIENT
Start: 2023-08-29 | End: 2023-08-29

## 2023-08-29 RX ADMIN — HYDROMORPHONE HYDROCHLORIDE 0.4 MG: 0.2 INJECTION, SOLUTION INTRAMUSCULAR; INTRAVENOUS; SUBCUTANEOUS at 11:37

## 2023-08-29 RX ADMIN — PIPERACILLIN AND TAZOBACTAM 4.5 G: 4; .5 INJECTION, POWDER, FOR SOLUTION INTRAVENOUS at 00:48

## 2023-08-29 RX ADMIN — DOXAZOSIN 4 MG: 1 TABLET ORAL at 21:14

## 2023-08-29 RX ADMIN — PIPERACILLIN AND TAZOBACTAM 4.5 G: 4; .5 INJECTION, POWDER, FOR SOLUTION INTRAVENOUS at 08:11

## 2023-08-29 RX ADMIN — PIPERACILLIN AND TAZOBACTAM 4.5 G: 4; .5 INJECTION, POWDER, FOR SOLUTION INTRAVENOUS at 19:47

## 2023-08-29 RX ADMIN — SODIUM CHLORIDE: 9 INJECTION, SOLUTION INTRAVENOUS at 19:46

## 2023-08-29 RX ADMIN — HYDROMORPHONE HYDROCHLORIDE 0.4 MG: 0.2 INJECTION, SOLUTION INTRAMUSCULAR; INTRAVENOUS; SUBCUTANEOUS at 08:11

## 2023-08-29 RX ADMIN — HYDROMORPHONE HYDROCHLORIDE 0.2 MG: 0.2 INJECTION, SOLUTION INTRAMUSCULAR; INTRAVENOUS; SUBCUTANEOUS at 19:56

## 2023-08-29 RX ADMIN — PIPERACILLIN AND TAZOBACTAM 4.5 G: 4; .5 INJECTION, POWDER, FOR SOLUTION INTRAVENOUS at 14:29

## 2023-08-29 RX ADMIN — FAMOTIDINE 20 MG: 10 INJECTION, SOLUTION INTRAVENOUS at 19:41

## 2023-08-29 RX ADMIN — SODIUM CHLORIDE: 9 INJECTION, SOLUTION INTRAVENOUS at 08:12

## 2023-08-29 RX ADMIN — FINASTERIDE 5 MG: 5 TABLET, FILM COATED ORAL at 14:29

## 2023-08-29 RX ADMIN — IOPAMIDOL 93 ML: 755 INJECTION, SOLUTION INTRAVENOUS at 01:10

## 2023-08-29 RX ADMIN — HYDROMORPHONE HYDROCHLORIDE 0.5 MG: 1 INJECTION, SOLUTION INTRAMUSCULAR; INTRAVENOUS; SUBCUTANEOUS at 00:47

## 2023-08-29 ASSESSMENT — ACTIVITIES OF DAILY LIVING (ADL)
ADLS_ACUITY_SCORE: 35

## 2023-08-29 NOTE — H&P
[facility]    History and Physical - Hospitalist Service       Date of Admission:  8/28/2023    Assessment & Plan      Francisco Miguel is a 68 year old male admitted on 8/28/2023. He has a PMH significant for prediabetes, BPH, HLD and previous TIA who presents to the ED for abdominal pain radiating to the groin and the left leg.  He went to urgent care on 27 August due to abrupt onset of abdominal pain in the lower abdomen radiating to his left flank.  He has had a previous bout of diverticulitis in June of this year.  He went on to have a CT of the abdomen and pelvis with contrast which showed acute uncomplicated sigmoid diverticulitis.  Also noted was a 2.6 mm solid right lower lobe pulmonary nodule for which follow-up is recommended in 1 month.  He was treated with p.o. Augmentin.  He has been taking his antibiotics however the pain got worse and he had a temperature of 102 last night and then low-grade fever this evening.  Hence he came to the ER for further evaluation.  In the ER seen by Dr. Arreguin.  I discussed care with him and I am asked to admit for further evaluation. I have asked Dr Arreguin to reach out to CRS given CT findings.    Sepsis due to complicated diverticulitis with free peritoneal air.  - NPO, IVF's.  - IV zosyn.  - CRS consult.  - pain meds.    2. Pre DM.  - sliding scale insulin.    3. Abnormal CT.  - will need follow up CT for 26 mm solid RLL nodule in 1 month as out-pt.         Diet: NPO for Medical/Clinical Reasons Except for: No Exceptions    DVT Prophylaxis: Pneumatic Compression Devices  Vitale Catheter: Not present  Lines: None     Cardiac Monitoring: None  Code Status:  Full Code.    Clinically Significant Risk Factors Present on Admission                # Drug Induced Platelet Defect: home medication list includes an antiplatelet medication   # Hypertension: Home medication list includes antihypertensive(s)                 Disposition Plan           Gabbie Jaquez,  MD  Hospitalist Service  [facility]  Securely message with Sylvia (more info)  Text page via [facility] Paging/Directory     ______________________________________________________________________    Chief Complaint     Abdominal Pain, fever.    History is obtained from the patient    History of Present Illness   Francisco Miguel is a 68 year old male who has a PMH significant for prediabetes, BPH, HLD and previous TIA who presents to the ED for abdominal pain radiating to the groin and the left leg.  He went to urgent care on 27 August due to abrupt onset of abdominal pain in the lower abdomen radiating to his left flank.  He has had a previous bout of diverticulitis in June of this year.  He went on to have a CT of the abdomen and pelvis with contrast which showed acute uncomplicated sigmoid diverticulitis.  Also noted was a 2.6 mm solid right lower lobe pulmonary nodule for which follow-up is recommended in 1 month.  He was treated with p.o. Augmentin.  He has been taking his antibiotics however the pain got worse and he had a temperature of 102 last night and then low-grade fever this evening.  Hence he came to the ER for further evaluation.  In the ER seen by Dr. Arreguin.  I discussed care with him and I am asked to admit for further evaluation. I have asked Dr Arreguin to reach out to CRS given CT findings.      Past Medical History    Past Medical History:   Diagnosis Date    Diabetes (H)     High cholesterol     TIA (transient ischemic attack)        Past Surgical History   Past Surgical History:   Procedure Laterality Date    ORTHOPEDIC SURGERY         Prior to Admission Medications   Prior to Admission Medications   Prescriptions Last Dose Informant Patient Reported? Taking?   EPINEPHrine (ANY BX GENERIC EQUIV) 0.3 MG/0.3ML injection 2-pack   No No   Sig: Inject 0.3 mLs (0.3 mg) into the muscle once as needed for anaphylaxis May repeat one time in 5-15 minutes if response to initial dose is  inadequate.   amoxicillin-clavulanate (AUGMENTIN) 875-125 MG tablet   Yes Yes   Sig: Take 1 tablet by mouth 2 times daily   aspirin 81 MG EC tablet   No No   Sig: Take 1 tablet (81 mg) by mouth daily   atorvastatin (LIPITOR) 10 MG tablet   Yes No   Sig: Take 10 mg by mouth daily   doxazosin (CARDURA) 1 MG tablet   Yes No   Sig: Take 4 mg by mouth At Bedtime   finasteride (PROSCAR) 5 MG tablet   Yes No   Sig: Take 5 mg by mouth daily   fish oil-omega-3 fatty acids 1000 MG capsule   Yes No   Sig: Take 2 g by mouth daily   metFORMIN (GLUCOPHAGE XR) 500 MG 24 hr tablet   Yes No   Sig: Take 500 mg by mouth every morning      Facility-Administered Medications: None        Review of Systems    The 10 point Review of Systems is negative other than noted in the HPI or here.     Social History   I have reviewed this patient's social history and updated it with pertinent information if needed.         Family History       Reviewed.      Allergies   Allergies   Allergen Reactions    Ciprofloxacin Angioedema     Lip swelling, throat fullness, nausea.      Metronidazole Angioedema     Lip swelling, throat fullness, nausea        Physical Exam   Vital Signs: Temp: 98.6  F (37  C) Temp src: Temporal BP: 117/61 Pulse: 84   Resp: 18 SpO2: 94 %      Weight: 0 lbs 0 oz    Gen - AAO x 3 in NAD.  Lungs - CTA B.  Heart - RR,S1+S2 nml, no m/g/r.  Abd - soft, + ttp in lower quadrants, ND, localized rebound in lower quadrants, + BS.  Ext - no edema.    Medical Decision Making       75  MINUTES SPENT BY ME on the date of service doing chart review, history, exam, documentation & further activities per the note.      Data     I have personally reviewed the following data over the past 24 hrs:    19.8 (H)  \   13.6   / 317     132 (L) 98 13.8 /  141 (H)   3.9 23 1.00 \     ALT: 27 AST: 35 AP: 173 (H) TBILI: 0.8   ALB: 3.8 TOT PROTEIN: 7.3 LIPASE: 15     Procal: N/A CRP: N/A Lactic Acid: 0.8         Imaging results reviewed over the past 24 hrs:    Recent Results (from the past 24 hour(s))   CT Abdomen Pelvis w Contrast    Narrative    EXAM: CT ABDOMEN PELVIS W CONTRAST  LOCATION: [facility]  DATE: 8/29/2023    INDICATION: Diverticulitis, now with worsening pain.  COMPARISON: CT abdomen and pelvis on 08/27/2023.  TECHNIQUE: CT scan of the abdomen and pelvis was performed after the injection of 93 ml Isovue 370 intravenously. Multiplanar reformats were obtained. Dose reduction techniques were used.    FINDINGS:   LOWER CHEST: Mild basilar pulmonary opacities, likely atelectasis.    ABDOMEN/PELVIS:    HEPATOBILIARY: No suspicious focal hepatic lesion. No radiodense gallstones.    PANCREAS: No main pancreatic ductal dilatation or definite solid pancreatic mass.    SPLEEN: No splenomegaly.    ADRENAL GLANDS: No adrenal nodules.    KIDNEYS/BLADDER: No radiodense kidney/ureteral stones or hydronephrosis in either kidney.    BOWEL: The appendix is visualized and appears normal. Colonic diverticulosis predominantly of the sigmoid colon with mild pericolonic fat stranding, findings worrisome for acute diverticulitis. Few mildly prominent small bowel loops in the left mid   abdomen, could represent focal ileus related to the adjacent inflammation.    PERITONEUM: Multiple foci of free peritoneal air, new as compared to 08/27/2023 exam, worrisome for colonic perforation. No significant change in the approximately 3.7 x 2.2 cm peripherally calcified hypodense area anterior to the diaphragmatic naomi   (series 3 image 46).    PELVIC ORGANS: Unremarkable.    VASCULATURE: Unremarkable.    LYMPH NODES: No significant abdominopelvic lymphadenopathy.    MUSCULOSKELETAL: No suspicious osseous lesion.      Impression    IMPRESSION:   1. Colonic diverticulosis with pericolonic fat stranding along the sigmoid colon, findings worrisome for acute diverticulitis, not significantly changed as compared to 08/27/2023 exam. However, there is new foci of free  peritoneal air, findings worrisome   for colonic perforation.

## 2023-08-29 NOTE — PROGRESS NOTES
[facility]    ED Boarding Nurse Handoff Addendum Report:    Date/time: 8/29/2023, 12:13 PM    Activity Level: standby    Fall Risk: Yes:  nonskid shoes/slippers when out of bed, patient and family education, and room door open    Active Infusions: NS @ 100    Current Meds Due: N/A    Current care needs: Pain control    Oxygen requirements (liters/min and/or FiO2): N/A    Respiratory status: Room air    Vital signs (within last 30 minutes):    Vitals:    08/29/23 0400 08/29/23 0600 08/29/23 0721 08/29/23 1137   BP: 109/62 (!) 140/73 133/70 133/68   BP Location:   Left arm    Pulse: 82 95 91 104   Resp:   16 18   Temp:   97.7  F (36.5  C) 98.8  F (37.1  C)   TempSrc:   Oral Oral   SpO2:  96% 96% 95%       Focused assessment within last 30 minutes:    Pt is A/O x4, up with SBA. Complained of abd pain and was given Dilaudid x2, increased pain with activity. CRS following, no planned surgical intervention at this time. Zosyn for antibiotics for diverticulitis. NPO. Transferred to room 522.     ED Boarding Nurse name: Amelia Gutierrez RN

## 2023-08-29 NOTE — ED TRIAGE NOTES
Here for concern of abdominal pain radiating down to groin and to left leg, and to the back. Went to urgent care yesterday, diagnose with diverticulitis and was prescribed Augmentin. Pain is getting worse. Stated low grade temp of 100F this evening. Also c/o nausea and burping. Gave tylenol at 10:30pm. ABCs intact.      Triage Assessment       Row Name 08/28/23 5167       Triage Assessment (Adult)    Airway WDL WDL       Respiratory WDL    Respiratory WDL WDL       Cardiac WDL    Cardiac WDL WDL

## 2023-08-29 NOTE — PLAN OF CARE
End of Shift Summary  For vital signs and complete assessments, please see documentation flowsheets.     Pertinent assessments: Pt alert and oriented x4. Pt up independently in room. NPO. IV infusing NS at 100ml/hr. On IV zosyn. Pt states he's in some pain but not enough for interventions, will let us know when it gets worse. Wife at bedside.     Major Shift Events: Arrived to unit     Treatment Plan: Antibiotics, pain management     Bedside Nurse: Chelsie Martinez RN

## 2023-08-29 NOTE — ED PROVIDER NOTES
History     Chief Complaint:  Abdominal Pain       HPI   Francisco Miguel is a 68 year old male with a history of diverticulitis and diabetes mellitis who presents to the emergency department with his wife for evaluation of abdominal pain. Patient reports that he was diagnosed with diverticulitis yesterday after presenting to urgent care with abdominal pain and was started on Augmentin, however the pain has been worsening. The pain is across the entire lower abdomen and goes down his hip to the lower left extremity. He had a fever of 102 last night and woke up at 3 am with a fever of 100 that broke with Tylenol. Patient confirms nausea, vomiting, and groin pain. He has already taken 3 doses of Augmentin, one yesterday and 2 today. He had another episode of diverticulitis earlier this summer. His wife reports that he had an allergic reaction to the first antibiotics that he was on, but after taking Augmentin, the diverticulitis resolved. In comparison, this pain is worse. Prior to the episode earlier this summer, he has no past instances of diverticulitis. Wife also notes that he had a TIA earlier this month. Patient has no history of abdominal surgery.    Independent Historian:   The patient's wife supplemented history as noted above.     Review of External Notes:   I reviewed an office visit from urgent care yesterday where he was seen for low abdominal pain and left flank pain. He had a CT scan done, results below. He was diagnosed with diverticulitis and started on Augmentin.     CT Abd Pelvis Impression 8/27/23  Acute uncomplicated sigmoid diverticulitis   6 mm solid right lower lobe pulmonary nodule is new from 6/19/2023. Recommend follow up chest CT in 1 month.     Medications:    Augmentin  Aspirin  Lipitor  Cardura  Proscar  Metformin  Albuterol  Uroxatral  Lexapro    Past Medical History:    Diabetes mellitus  High cholesterol  TIA  Mild obstructive sleep apnea  Bilateral sensorineural hearing  loss  Dyslipidemia  Benign prostatic hyperplasia with nocturia    Past Surgical History:    Orthopedic surgery     Physical Exam   Patient Vitals for the past 24 hrs:   BP Temp Temp src Pulse Resp SpO2   08/29/23 0100 117/61 -- -- 84 -- 94 %   08/29/23 0045 121/69 -- -- -- -- 95 %   08/28/23 2259 117/64 98.6  F (37  C) Temporal 102 18 95 %        Physical Exam  General:              Well-nourished              Speaking in full sentences  Eyes:              Conjunctiva without injection or scleral icterus  ENT:              Moist mucous membranes              Nares patent              Pinnae normal  Neck:              Full ROM              No stiffness appreciated  Resp:              Lungs CTAB              No crackles, wheezing or audible rubs              Good air movement  CV:                    Normal rate, regular rhythm              S1 and S2 present              No murmur, gallop or rub  GI:              BS present              Abdomen soft without distention              Diffuse tenderness to palpation throughout              No guarding or rebound tenderness  :              No skin changes or erythema to perineal region  Skin:              Warm, dry, well perfused              No rashes or open wounds on exposed skin  MSK:              Moves all extremities              No focal deformities or swelling  Neuro:              Alert              Answers questions appropriately              Moves all extremities equally              Gait stable  Psych: Normal affect, normal mood    Emergency Department Course     Imaging:  CT Abdomen Pelvis w Contrast   Preliminary Result   IMPRESSION:    1. Colonic diverticulosis with pericolonic fat stranding along the sigmoid colon, findings worrisome for acute diverticulitis, not significantly changed as compared to 08/27/2023 exam. However, there is new foci of free peritoneal air, findings worrisome    for colonic perforation.         Report per  radiology    Laboratory:  Labs Ordered and Resulted from Time of ED Arrival to Time of ED Departure   COMPREHENSIVE METABOLIC PANEL - Abnormal       Result Value    Sodium 132 (*)     Potassium 3.9      Chloride 98      Carbon Dioxide (CO2) 23      Anion Gap 11      Urea Nitrogen 13.8      Creatinine 1.00      Calcium 9.2      Glucose 141 (*)     Alkaline Phosphatase 173 (*)     AST 35      ALT 27      Protein Total 7.3      Albumin 3.8      Bilirubin Total 0.8      GFR Estimate 82     CBC WITH PLATELETS AND DIFFERENTIAL - Abnormal    WBC Count 19.8 (*)     RBC Count 4.49      Hemoglobin 13.6      Hematocrit 40.1      MCV 89      MCH 30.3      MCHC 33.9      RDW 13.1      Platelet Count 317      % Neutrophils 85      % Lymphocytes 9      % Monocytes 5      % Eosinophils 0      % Basophils 0      % Immature Granulocytes 1      NRBCs per 100 WBC 0      Absolute Neutrophils 16.9 (*)     Absolute Lymphocytes 1.7      Absolute Monocytes 1.0      Absolute Eosinophils 0.1      Absolute Basophils 0.0      Absolute Immature Granulocytes 0.1      Absolute NRBCs 0.0     LIPASE - Normal    Lipase 15     LACTIC ACID WHOLE BLOOD - Normal    Lactic Acid 0.8     BLOOD CULTURE   BLOOD CULTURE       Emergency Department Course & Assessments:     Interventions:  Medications   HYDROmorphone (PF) (DILAUDID) injection 0.5 mg (0.5 mg Intravenous $Given 8/29/23 0047)   piperacillin-tazobactam (ZOSYN) 4.5 g vial to attach to  mL bag (0 g Intravenous Stopped 8/29/23 0143)   iopamidol (ISOVUE-370) solution 500 mL (93 mLs Intravenous $Given 8/29/23 0110)   sodium chloride (PF) 0.9% PF flush 100 mL (65 mLs Intravenous $Given 8/29/23 0111)      Assessments:  0008 I obtained patient history and examined the patient as noted above.   0055 I rechecked and updated the patient.     Independent Interpretation (X-rays, CTs, rhythm strip):  Reviewed CT    Consultations/Discussion of Management or Tests:  0204 I consulted with Dr. Jaquez, of the  hospitalist team, regarding the patient. They accepted the patient for admission.   0241 I spoke with Dr. Paul, colorectal surgery, regarding the patient.   0246 I consulted with Dr. Jaquez regarding my consult with colorectal surgery.       Social Determinants of Health affecting care:   None    Disposition:  The patient was admitted to the hospital under the care of Dr. Jaquez.     Impression & Plan    Medical Decision Making:  Francisco Miguel is a 68-year-old male presenting to the ED for evaluation of abdominal pain.  VS on presentation unremarkable.  History obtained from patient, supplemented by spouse present at bedside, and through chart review.  Patient diagnosed with diverticulitis yesterday, though presenting with increasing pain and tenderness on exam.  Following shared decision-making conversation, repeat imaging performed.  This demonstrates findings of acute diverticulitis, though patient now has a few foci of free peritoneal air, worrisome for colonic perforation.  Note made of a hypodense area near the diaphragmatic naomi, which family is aware of, and is in the process of obtaining further follow-up.  Patient's labs reveal leukocytosis, though normal lactate.  Blood cultures x2 obtained and are presently pending.  Patient started on Zosyn.  He will require hospital admission for further management and treatment.  Imaging and case reviewed with Dr. Paul of colon and rectal surgery who recommended admission, and they will see patient first thing in the morning, though did not feel he required emergent surgical intervention.  Case discussed with Dr. Jaquez who has accepted patient for admission.     Diagnosis:    ICD-10-CM    1. Diverticulitis of colon with perforation  K57.20         Scribe Disclosure:  I, Geovanna Rodriguez, am serving as a scribe  for Kemi De Dios at 1:42 AM on 8/29/2023 to document services personally performed by Mike Arreguin MD based on my observations and the provider's  statements to me.    Scribe Disclosure:  I, Kemi De Dios, am serving as a scribe at 2:11 AM on 8/29/2023 to document services personally performed by Mike Arreguin MD based on my observations and the provider's statements to me.    8/28/2023   Mike Arreguin MD Roach, Brian Donald, MD  08/29/23 0257

## 2023-08-29 NOTE — PROGRESS NOTES
Admitted earlier this morning  H&P reviewed  Consultation notes reviewed  Patient extensive and prompt input from CRS service  Continuation of conservative nonoperative management for now  -Continue bowel rest, IV fluids, IV antibiotics, optimization of pain control  -Monitor infectious markers  Seen and examined.  Patient's family updated this patient's wife present at bedside during my encounter

## 2023-08-29 NOTE — CONSULTS
[facility]  Colon and Rectal Surgery Consult Note  Name: Francisco Miguel    MRN: [id number]  YOB: 1955    Age: 68 year old  Date of admission: 8/28/2023  Primary care provider: Sulaiman Perales     Requesting Physician:  Dr. Jaquez  Reason for consult:  Diverticulitis with perforation           History of Present Illness:   Francisco Miguel is a 68 year old male with a history of prediabetes, BPH, HLD, previous TIA, and diverticulitis, seen at the request of Dr. Jaquez, presents with abdominal pain.     The patient reports that he developed acute lower abdominal pain 2 days ago. He then presented to urgent care on 8/27. A CT scan revealed uncomplicated diverticulitis and he was prescribed oral Augmentin. He had taken 3 doses of the Augmentin, but had worsening abdominal pain that was then associated with nausea and a fever up to 102. Due to the progression of pain, he presented to the ER. In the ER, the patient was afebrile with some mild tachycardia to 102. His labs were remarkable for a sodium 132, alk phos 173, and WBC 19.8. Another CT scan of the abdomen/pelvis revealed acute diverticulitis with new foci of free peritoneal air suggesting colonic perforation. He was started on IV Zosyn and admitted for further management.    Today, the patient is resting in bed. He has had continued abdominal pain but just recently started IV antibiotics within the hour. He reports having his first episode of uncomplicated diverticulitis in June of this year which was treated successfully with Augmentin.    Colonoscopy History:  3/11/22: Diverticulosis in the sigmoid and ascending colon and 2 polyps in the rectum. I do not have pathology available.     Surgical History: No previous abdominal surgeries            Past Medical History:     Past Medical History:   Diagnosis Date    Diabetes (H)     High cholesterol     TIA (transient ischemic attack)              Past Surgical History:     Past Surgical History:    Procedure Laterality Date    ORTHOPEDIC SURGERY                 Social History:     Social History     Tobacco Use    Smoking status: Not on file    Smokeless tobacco: Not on file   Substance Use Topics    Alcohol use: Not on file             Family History:   No family history on file.          Allergies:     Allergies   Allergen Reactions    Ciprofloxacin Angioedema     Lip swelling, throat fullness, nausea.      Metronidazole Angioedema     Lip swelling, throat fullness, nausea             Medications:      insulin aspart  1-6 Units Subcutaneous Q4H    piperacillin-tazobactam  4.5 g Intravenous Q6H    sodium chloride (PF)  3 mL Intracatheter Q8H             Review of Systems:   A comprehensive greater than 10 system review of systems was carried out.  Pertinent positives and negatives are noted above.  Otherwise negative for contributory info.            Physical Exam:     Blood pressure 133/70, pulse 91, temperature 97.7  F (36.5  C), temperature source Oral, resp. rate 16, SpO2 96 %.  No intake or output data in the 24 hours ending 08/29/23 0943  EXAM:  GEN: Awake alert and oriented, appears stated age  PULM: Non-labored breathing with normal respiratory effort  CVS: reg rate and rhythm, no peripheral edema  ABD: Soft, diffusely tender, some distention. Some guarding noted.   RECTAL: Rectal exam was deferred  NEURO: CN II-XII grossly intact  MSK: extremeties with no clubbing, cyanosis or edema; able to ambulate  PSYCH: responsive, alert, cooperative; oriented x3; appropriate mood and affect  EXT/SKIN: inspection reveals no rashes, lesions or ulcers, normal coloring         Data Reviewed:     Results for orders placed or performed during the hospital encounter of 08/28/23   CT Abdomen Pelvis w Contrast    Narrative    EXAM: CT ABDOMEN PELVIS W CONTRAST  LOCATION: [facility]  DATE: 8/29/2023    INDICATION: Diverticulitis, now with worsening pain.  COMPARISON: CT abdomen and pelvis on  08/27/2023.  TECHNIQUE: CT scan of the abdomen and pelvis was performed after the injection of 93 ml Isovue 370 intravenously. Multiplanar reformats were obtained. Dose reduction techniques were used.    FINDINGS:   LOWER CHEST: Mild basilar pulmonary opacities, likely atelectasis.    ABDOMEN/PELVIS:    HEPATOBILIARY: No suspicious focal hepatic lesion. No radiodense gallstones.    PANCREAS: No main pancreatic ductal dilatation or definite solid pancreatic mass.    SPLEEN: No splenomegaly.    ADRENAL GLANDS: No adrenal nodules.    KIDNEYS/BLADDER: No radiodense kidney/ureteral stones or hydronephrosis in either kidney.    BOWEL: The appendix is visualized and appears normal. Colonic diverticulosis predominantly of the sigmoid colon with mild pericolonic fat stranding, findings worrisome for acute diverticulitis. Few mildly prominent small bowel loops in the left mid   abdomen, could represent focal ileus related to the adjacent inflammation.    PERITONEUM: Multiple foci of free peritoneal air, new as compared to 08/27/2023 exam, worrisome for colonic perforation. No significant change in the approximately 3.7 x 2.2 cm peripherally calcified hypodense area anterior to the diaphragmatic naomi   (series 3 image 46).    PELVIC ORGANS: Unremarkable.    VASCULATURE: Unremarkable.    LYMPH NODES: No significant abdominopelvic lymphadenopathy.    MUSCULOSKELETAL: No suspicious osseous lesion.      Impression    IMPRESSION:   1. Colonic diverticulosis with pericolonic fat stranding along the sigmoid colon, findings worrisome for acute diverticulitis, not significantly changed as compared to 08/27/2023 exam. However, there is new foci of free peritoneal air, suggesting   interval colonic perforation.       Recent Labs   Lab 08/28/23  2306   WBC 19.8*   HGB 13.6   HCT 40.1   MCV 89        Recent Labs   Lab 08/29/23  0822 08/28/23  2306   NA  --  132*   POTASSIUM  --  3.9   CHLORIDE  --  98   CO2  --  23   ANIONGAP  --   11   * 141*   BUN  --  13.8   CR  --  1.00   GFRESTIMATED  --  82   JOSE  --  9.2   PROTTOTAL  --  7.3   ALBUMIN  --  3.8   BILITOTAL  --  0.8   ALKPHOS  --  173*   AST  --  35   ALT  --  27     No results for input(s): INR in the last 168 hours.  Recent Labs   Lab 08/29/23  0037   LACT 0.8     No results for input(s): COLOR, APPEARANCE, URINEGLC, URINEBILI, URINEKETONE, SG, UBLD, URINEPH, PROTEIN, UROBILINOGEN, NITRITE, LEUKEST, RBCU, WBCU in the last 168 hours.      Assessment and Plan:   Francisco Miguel is a 68 year old male with a history of prediabetes, BPH, HLD, previous TIA, and diverticulitis, seen at the request of Dr. Jaquez, presents with abdominal pain. He recently went to urgent care on 8/27 and was noted to have uncomplicated diverticulitis. He was prescribed oral Augmentin which he took 3 doses of. He continued to have worsening abdominal pain with the development of a fever and nausea, so came back to the ER. A repeat CT scan of the abdomina pelvis revealed acute diverticulitis with a perforation and a few foci of free peritoneal air. Abdominal exam was soft, with diffuse tenderness and some distention. His WBC is currently 19.8. He is currently hemodynamically stable. No indication for emergent surgery. However, we will need to monitor closely with serial abdominal exams given tenderness today. Recommend continued conservative management for now with bowel rest, IVF, and IV antibiotics. Pain management as needed. Discussed that if he fails to improve with conservative management, he would likely need surgery this admission which would entail a sigmoid resection with a possible temporary stoma. Patient seemed to understand. We will continue to follow.     Plan:  Admit to hospitalist  Surgery: No emergent surgery indicated, but monitor closely with serial abdominal exams.  Diet: NPO  IV Fluids: Per hospitalist  Antibiotics:  IV Zosyn  Medications: Continue home meds per hospitalist  I&O s:  strict  I&O s  Labs:   - Reviewed: by myself  - Ordered: None   Imaging:   - Dr. Lane and myself have personally viewed: CT abd/pelvis  - Ordered:  None  Activity: ambulate as tolerated, encourage OOB  DVT prophylaxis: SCD s  This plan has been discussed with Dr. Lane    Patient specific identified risk factors considered as part of today s evaluation include: previous diverticulitis, prediabetic     Additional history obtained from chart and patient.  Time spent on consultation: 40 minutes, greater than 50 percent of the total encounter time is spent in counseling and/or coordination of care          Ashtyn Lewis PA-C  [facility]  Phone:  [phone]

## 2023-08-29 NOTE — ED NOTES
[facility]  ED Nurse Handoff Report    ED Chief complaint: Abdominal Pain  . ED Diagnosis:   Final diagnoses:   Diverticulitis of colon with perforation       Allergies:   Allergies   Allergen Reactions    Ciprofloxacin Angioedema     Lip swelling, throat fullness, nausea.      Metronidazole Angioedema     Lip swelling, throat fullness, nausea       Code Status: Full Code    Activity level - Baseline/Home:  independent.  Activity Level - Current:   standby.   Lift room needed: No.   Bariatric: No   Needed: No   Isolation: No.   Infection: Not Applicable.     Respiratory status: Room air    Vital Signs (within 30 minutes):   Vitals:    08/28/23 2259 08/29/23 0045 08/29/23 0100   BP: 117/64 121/69 117/61   Pulse: 102  84   Resp: 18     Temp: 98.6  F (37  C)     TempSrc: Temporal     SpO2: 95% 95% 94%       Cardiac Rhythm:  ,      Pain level:    Patient confused: No.   Patient Falls Risk: nonskid shoes/slippers when out of bed and patient and family education.   Elimination Status: Has voided     Patient Report - Initial Complaint: abdominal pain.   Focused Assessment: diverticulitis with perforation     Abnormal Results:   Labs Ordered and Resulted from Time of ED Arrival to Time of ED Departure   COMPREHENSIVE METABOLIC PANEL - Abnormal       Result Value    Sodium 132 (*)     Potassium 3.9      Chloride 98      Carbon Dioxide (CO2) 23      Anion Gap 11      Urea Nitrogen 13.8      Creatinine 1.00      Calcium 9.2      Glucose 141 (*)     Alkaline Phosphatase 173 (*)     AST 35      ALT 27      Protein Total 7.3      Albumin 3.8      Bilirubin Total 0.8      GFR Estimate 82     CBC WITH PLATELETS AND DIFFERENTIAL - Abnormal    WBC Count 19.8 (*)     RBC Count 4.49      Hemoglobin 13.6      Hematocrit 40.1      MCV 89      MCH 30.3      MCHC 33.9      RDW 13.1      Platelet Count 317      % Neutrophils 85      % Lymphocytes 9      % Monocytes 5      % Eosinophils 0      % Basophils 0       % Immature Granulocytes 1      NRBCs per 100 WBC 0      Absolute Neutrophils 16.9 (*)     Absolute Lymphocytes 1.7      Absolute Monocytes 1.0      Absolute Eosinophils 0.1      Absolute Basophils 0.0      Absolute Immature Granulocytes 0.1      Absolute NRBCs 0.0     LIPASE - Normal    Lipase 15     LACTIC ACID WHOLE BLOOD - Normal    Lactic Acid 0.8     BLOOD CULTURE   BLOOD CULTURE        CT Abdomen Pelvis w Contrast   Preliminary Result   IMPRESSION:    1. Colonic diverticulosis with pericolonic fat stranding along the sigmoid colon, findings worrisome for acute diverticulitis, not significantly changed as compared to 08/27/2023 exam. However, there is new foci of free peritoneal air, findings worrisome    for colonic perforation.          Treatments provided: iv ab  Family Comments:   OBS brochure/video discussed/provided to patient:  N/A  ED Medications:   Medications   HYDROmorphone (PF) (DILAUDID) injection 0.5 mg (0.5 mg Intravenous $Given 8/29/23 0047)   piperacillin-tazobactam (ZOSYN) 4.5 g vial to attach to  mL bag (0 g Intravenous Stopped 8/29/23 0143)   iopamidol (ISOVUE-370) solution 500 mL (93 mLs Intravenous $Given 8/29/23 0110)   sodium chloride (PF) 0.9% PF flush 100 mL (65 mLs Intravenous $Given 8/29/23 0111)       Drips infusing:  No  For the majority of the shift this patient was Green.   Interventions performed were .    Sepsis treatment initiated: No    Cares/treatment/interventions/medications to be completed following ED care:     ED Nurse Name: Vivian Villatoro RN  2:10 AM    RECEIVING UNIT ED HANDOFF REVIEW    Above ED Nurse Handoff Report was reviewed: Yes  Reviewed by: Chelsie Martinez RN on August 29, 2023 at 12:00 PM

## 2023-08-29 NOTE — PHARMACY-ADMISSION MEDICATION HISTORY
Pharmacist Admission Medication History    Admission medication history is complete. The information provided in this note is only as accurate as the sources available at the time of the update.    Medication reconciliation/reorder completed by provider prior to medication history? No    Information Source(s): Patient via in-person    Pertinent Information: none    Changes made to PTA medication list:  Added: escitalopram  Deleted: None  Changed: atorvastatin (10 mg --> 20 mg)    Medication Affordability: no issues     Allergies reviewed with patient and updates made in EHR: yes  Medication History Completed By: Rudy Miller RPH 8/29/2023 9:32 AM    Prior to Admission medications    Medication Sig Last Dose Taking? Auth Provider Long Term End Date   amoxicillin-clavulanate (AUGMENTIN) 875-125 MG tablet Take 1 tablet by mouth 2 times daily 8/28/2023 at day 2/7 Yes Reported, Patient  9/3/23   aspirin 81 MG EC tablet Take 1 tablet (81 mg) by mouth daily 8/28/2023 at am Yes Demetra Benjamin PA-C     atorvastatin (LIPITOR) 20 MG tablet Take 20 mg by mouth daily 8/28/2023 at am Yes Unknown, Entered By History No    EPINEPHrine (ANY BX GENERIC EQUIV) 0.3 MG/0.3ML injection 2-pack Inject 0.3 mLs (0.3 mg) into the muscle once as needed for anaphylaxis May repeat one time in 5-15 minutes if response to initial dose is inadequate. More than a month at a Yes Alejandrina Pacheco PA-C     escitalopram (LEXAPRO) 5 MG tablet Take 5 mg by mouth daily 8/28/2023 at am Yes Unknown, Entered By History Yes    finasteride (PROSCAR) 5 MG tablet Take 5 mg by mouth daily 8/28/2023 at am Yes Unknown, Entered By History     fish oil-omega-3 fatty acids 1000 MG capsule Take 2 g by mouth daily 8/28/2023 at am Yes Reported, Patient     metFORMIN (GLUCOPHAGE XR) 500 MG 24 hr tablet Take 500 mg by mouth every morning 8/28/2023 at am Yes Unknown, Entered By History No    doxazosin (CARDURA) 4 MG tablet Take 4 mg by mouth At Bedtime  8/27/2023 at   Reported, Patient Yes

## 2023-08-30 LAB
ANION GAP SERPL CALCULATED.3IONS-SCNC: 10 MMOL/L (ref 7–15)
BASOPHILS # BLD AUTO: 0 10E3/UL (ref 0–0.2)
BASOPHILS NFR BLD AUTO: 0 %
BUN SERPL-MCNC: 13.5 MG/DL (ref 8–23)
CALCIUM SERPL-MCNC: 8.1 MG/DL (ref 8.8–10.2)
CHLORIDE SERPL-SCNC: 105 MMOL/L (ref 98–107)
CREAT SERPL-MCNC: 0.9 MG/DL (ref 0.67–1.17)
DEPRECATED HCO3 PLAS-SCNC: 23 MMOL/L (ref 22–29)
EOSINOPHIL # BLD AUTO: 0.1 10E3/UL (ref 0–0.7)
EOSINOPHIL NFR BLD AUTO: 0 %
ERYTHROCYTE [DISTWIDTH] IN BLOOD BY AUTOMATED COUNT: 13.2 % (ref 10–15)
GFR SERPL CREATININE-BSD FRML MDRD: >90 ML/MIN/1.73M2
GLUCOSE BLDC GLUCOMTR-MCNC: 105 MG/DL (ref 70–99)
GLUCOSE BLDC GLUCOMTR-MCNC: 111 MG/DL (ref 70–99)
GLUCOSE BLDC GLUCOMTR-MCNC: 111 MG/DL (ref 70–99)
GLUCOSE BLDC GLUCOMTR-MCNC: 118 MG/DL (ref 70–99)
GLUCOSE BLDC GLUCOMTR-MCNC: 129 MG/DL (ref 70–99)
GLUCOSE BLDC GLUCOMTR-MCNC: 98 MG/DL (ref 70–99)
GLUCOSE SERPL-MCNC: 133 MG/DL (ref 70–99)
HCT VFR BLD AUTO: 37.4 % (ref 40–53)
HGB BLD-MCNC: 12.6 G/DL (ref 13.3–17.7)
IMM GRANULOCYTES # BLD: 0.2 10E3/UL
IMM GRANULOCYTES NFR BLD: 1 %
LYMPHOCYTES # BLD AUTO: 1.1 10E3/UL (ref 0.8–5.3)
LYMPHOCYTES NFR BLD AUTO: 6 %
MAGNESIUM SERPL-MCNC: 2.1 MG/DL (ref 1.7–2.3)
MCH RBC QN AUTO: 30.2 PG (ref 26.5–33)
MCHC RBC AUTO-ENTMCNC: 33.7 G/DL (ref 31.5–36.5)
MCV RBC AUTO: 90 FL (ref 78–100)
MONOCYTES # BLD AUTO: 1 10E3/UL (ref 0–1.3)
MONOCYTES NFR BLD AUTO: 6 %
NEUTROPHILS # BLD AUTO: 15.7 10E3/UL (ref 1.6–8.3)
NEUTROPHILS NFR BLD AUTO: 87 %
NRBC # BLD AUTO: 0 10E3/UL
NRBC BLD AUTO-RTO: 0 /100
PLATELET # BLD AUTO: 263 10E3/UL (ref 150–450)
POTASSIUM SERPL-SCNC: 4.2 MMOL/L (ref 3.4–5.3)
RBC # BLD AUTO: 4.17 10E6/UL (ref 4.4–5.9)
SODIUM SERPL-SCNC: 138 MMOL/L (ref 136–145)
WBC # BLD AUTO: 18.1 10E3/UL (ref 4–11)

## 2023-08-30 PROCEDURE — 120N000001 HC R&B MED SURG/OB

## 2023-08-30 PROCEDURE — 250N000013 HC RX MED GY IP 250 OP 250 PS 637: Performed by: INTERNAL MEDICINE

## 2023-08-30 PROCEDURE — 83735 ASSAY OF MAGNESIUM: CPT | Performed by: INTERNAL MEDICINE

## 2023-08-30 PROCEDURE — 36415 COLL VENOUS BLD VENIPUNCTURE: CPT | Performed by: INTERNAL MEDICINE

## 2023-08-30 PROCEDURE — 258N000003 HC RX IP 258 OP 636: Performed by: INTERNAL MEDICINE

## 2023-08-30 PROCEDURE — 99233 SBSQ HOSP IP/OBS HIGH 50: CPT | Performed by: INTERNAL MEDICINE

## 2023-08-30 PROCEDURE — 250N000011 HC RX IP 250 OP 636: Mod: JZ | Performed by: INTERNAL MEDICINE

## 2023-08-30 PROCEDURE — 85025 COMPLETE CBC W/AUTO DIFF WBC: CPT | Performed by: INTERNAL MEDICINE

## 2023-08-30 PROCEDURE — 82374 ASSAY BLOOD CARBON DIOXIDE: CPT | Performed by: INTERNAL MEDICINE

## 2023-08-30 RX ADMIN — PIPERACILLIN AND TAZOBACTAM 4.5 G: 4; .5 INJECTION, POWDER, FOR SOLUTION INTRAVENOUS at 21:05

## 2023-08-30 RX ADMIN — FAMOTIDINE 20 MG: 10 INJECTION, SOLUTION INTRAVENOUS at 09:47

## 2023-08-30 RX ADMIN — SODIUM CHLORIDE: 9 INJECTION, SOLUTION INTRAVENOUS at 05:55

## 2023-08-30 RX ADMIN — FINASTERIDE 5 MG: 5 TABLET, FILM COATED ORAL at 09:45

## 2023-08-30 RX ADMIN — PIPERACILLIN AND TAZOBACTAM 4.5 G: 4; .5 INJECTION, POWDER, FOR SOLUTION INTRAVENOUS at 10:01

## 2023-08-30 RX ADMIN — PIPERACILLIN AND TAZOBACTAM 4.5 G: 4; .5 INJECTION, POWDER, FOR SOLUTION INTRAVENOUS at 16:12

## 2023-08-30 RX ADMIN — PIPERACILLIN AND TAZOBACTAM 4.5 G: 4; .5 INJECTION, POWDER, FOR SOLUTION INTRAVENOUS at 03:17

## 2023-08-30 RX ADMIN — DOXAZOSIN 4 MG: 1 TABLET ORAL at 21:05

## 2023-08-30 RX ADMIN — HYDROMORPHONE HYDROCHLORIDE 0.2 MG: 0.2 INJECTION, SOLUTION INTRAMUSCULAR; INTRAVENOUS; SUBCUTANEOUS at 03:17

## 2023-08-30 RX ADMIN — SODIUM CHLORIDE: 9 INJECTION, SOLUTION INTRAVENOUS at 16:12

## 2023-08-30 RX ADMIN — FAMOTIDINE 20 MG: 10 INJECTION, SOLUTION INTRAVENOUS at 20:14

## 2023-08-30 ASSESSMENT — ACTIVITIES OF DAILY LIVING (ADL)
ADLS_ACUITY_SCORE: 35

## 2023-08-30 NOTE — PROGRESS NOTES
To Do:  End of Shift Summary  For vital signs and complete assessments, please see documentation flowsheets.     Pertinent assessments: A&Ox4. VSS on room air. Pt states that the pain has gone from 15 to 4 today. NPO. Denies nausea. Passing flatus and had a bowel movement today. Right PIV. Pt reports abd pressure, fullness. IV zosyn given with fluids.     Major Shift Events: None    Treatment Plan: Medication and pain management    Bedside Nurse: Marcela Salamanca RN

## 2023-08-30 NOTE — PLAN OF CARE
End of Shift Summary  For vital signs and complete assessments, please see documentation flowsheets.     Pertinent assessments: Pt alert and oriented x4. Pt up independently. Pt denies any pain at this time. NPO. IV infusing NS @ 100ml/hr. On IV zosyn.     Major Shift Events: None    Treatment Plan: Pain management, antibiotics    Bedside Nurse: Chelsie Martinez RN

## 2023-08-30 NOTE — PLAN OF CARE
Goal Outcome Evaluation:    End of Shift Summary  For vital signs and complete assessments, please see documentation flowsheets.     Pertinent assessments: Alert and oriented x4, independently in room, NPO. IV NS infusing @ 100 ml/hr, voiding without difficulty, passing flatus. On Abx IV zosyn,  c/o pain PRN IV Dilaudid was given with relief. Continue with POC.    Major Shift Events: None    Treatment Plan: Antibiotics, pain management     Bedside Nurse: Carly Hernández RN

## 2023-08-30 NOTE — PROGRESS NOTES
COLON & RECTAL SURGERY  PROGRESS NOTE    August 30, 2023    SUBJECTIVE:  Feeling a lot better than yesterday. Pain is improving. Still feeling bloated. He denies any nausea or vomiting. +gas. No BM yet.    OBJECTIVE:  Temp:  [98.3  F (36.8  C)-98.9  F (37.2  C)] 98.9  F (37.2  C)  Pulse:  [] 86  Resp:  [16-20] 20  BP: (110-138)/(57-72) 134/67  SpO2:  [92 %-98 %] 93 %  No intake or output data in the 24 hours ending 08/30/23 0932    GENERAL:  Awake, alert, no acute distress, lying in bed  HEAD: Nomocephalic atraumatic  SCLERA: anicteric  EXTREMITIES: warm and well perfused  ABDOMEN:  Soft, mild diffuse tenderness, distended, no rebound or guarding, no peritoneal signs    LABS:  Lab Results   Component Value Date    WBC 18.1 08/30/2023    WBC 8.8 01/27/2019     Lab Results   Component Value Date    HGB 12.6 08/30/2023    HGB 14.3 01/27/2019     Lab Results   Component Value Date    HCT 37.4 08/30/2023    HCT 44.1 01/27/2019     Lab Results   Component Value Date     08/30/2023     01/27/2019     Last Basic Metabolic Panel:  Lab Results   Component Value Date     08/30/2023     01/27/2019      Lab Results   Component Value Date    POTASSIUM 4.2 08/30/2023    POTASSIUM 4.1 01/27/2019     Lab Results   Component Value Date    CHLORIDE 105 08/30/2023    CHLORIDE 108 01/27/2019     Lab Results   Component Value Date    JOSE 8.1 08/30/2023    JOSE 9.1 01/27/2019     Lab Results   Component Value Date    CO2 23 08/30/2023    CO2 26 01/27/2019     Lab Results   Component Value Date    BUN 13.5 08/30/2023    BUN 15 01/27/2019     Lab Results   Component Value Date    CR 0.90 08/30/2023    CR 0.88 01/27/2019     Lab Results   Component Value Date     08/30/2023     08/30/2023    GLC 96 01/27/2019       ASSESSMENT/PLAN:Francisco is a 68 year old man admitted with perforated diverticulitis with a small amount of intraperitoneal air.     - NPO/IVF  - Continue IV antibiotics  - Pain management  as needed  - No plans for surgery    For questions/paging, please contact the CRS office at [phone].    Ashtyn Lewis PA-C  [facility]  Phone: [phone]    Colon and Rectal Surgery Attending Note    Patient seen and examined independently.  Agree with above assessment and plan.  Feeling better today.  Had a soft bowel movement earlier today.  No bleeding.  WBC now 18 from near 20 yesterday  Abdomen: Softly distended, mild left lower quadrant tenderness without rebound or guarding.  Plan:  Continue n.p.o., IV fluids, supportive care.    Encourage ambulation, minimize narcotics.  Repeat CBC in AM.    Ivet Mason MD  [facility]  [address], [address]  [place] [address]  T: [phone]  F: [phone]   [email or web address]        .  Repeat CBC in the morning to trend

## 2023-08-30 NOTE — PROGRESS NOTES
[facility]    Medicine Progress Note - Hospitalist Service    Date of Admission:  8/28/2023    Assessment & Plan      Francisco Miguel is a 68 year old male admitted on 8/28/2023. He has a PMH significant for prediabetes, BPH, HLD and previous TIA who presents to the ED for abdominal pain radiating to the groin and the left leg.  He went to urgent care on 27 August due to abrupt onset of abdominal pain in the lower abdomen radiating to his left flank.  He has had a previous bout of diverticulitis in June of this year.  He went on to have a CT of the abdomen and pelvis with contrast which showed acute uncomplicated sigmoid diverticulitis.  Also noted was a 2.6 mm solid right lower lobe pulmonary nodule for which follow-up is recommended in 1 month.  He was treated with p.o. Augmentin.  He has been taking his antibiotics however the pain got worse and he had a temperature of 102 last night and then low-grade fever this evening.  Hence he came to the ER for further evaluation.      Sepsis due to complicated diverticulitis with free peritoneal air.  -Appreciate continuous input from colorectal service   -No plans for emergent operative approach   -Continuation of conservative management.  Currently on IV fluid support.  Appears to be tolerating well.  Voiding freely.  No episodes of hypoxia.  No edema.    -Optimize pain control.  Improvement with earlier abdominal pain   -Leukocytosis improving   -Remained afebrile   -Now passing flatus.  No BM   -Cultures remain no growth up-to-date   -Continued on IV antibiotics with Zosyn     2. Pre DM.  - sliding scale insulin.  -Hold home regimen of metformin    3. Abnormal CT.  - will need follow up CT for 26 mm solid RLL nodule in 1 month as out-pt.         Diet: NPO per Anesthesia Guidelines for Procedure/Surgery Except for: Meds    DVT Prophylaxis: Pneumatic Compression Devices and Ambulate every shift  Vitale Catheter: Not present  Lines: None     Cardiac  "Monitoring: None  Code Status: Full Code      Clinically Significant Risk Factors          # Hypocalcemia: Lowest Ca = 8.1 mg/dL in last 2 days, will monitor and replace as appropriate                # Overweight: Estimated body mass index is 28.36 kg/m  as calculated from the following:    Height as of this encounter: 1.727 m (5' 8\").    Weight as of this encounter: 84.6 kg (186 lb 8 oz)., PRESENT ON ADMISSION            Disposition Plan     Expected Discharge Date: Anticipating team will be requiring inpatient care at least in the next 2-3 more days                 Rashaad Juares MD, MD  Hospitalist Service  [facility]  Securely message with Thing[employer] (more info)  Text page via [employer] Paging/Directory   ______________________________________________________________________    Interval History   Continuing medicine service care today.  Seen and examined.  Chart reviewed.  Family updated this patient's wife present at bedside during my encounter.  TM is in good spirits as he is feeling significantly improved and better than yesterday.  He is less bloated and no belching.  Able to pass some flatus earlier.  Voiding freely.  Denies any chest pain or shortness of breath.  No reported mental status changes either.  No bleeding tendencies.  Remained afebrile.  He is not requiring any oxygen support.    Physical Exam   Vital Signs: Temp: 98.9  F (37.2  C) Temp src: Oral BP: 134/67 Pulse: 86   Resp: 20 SpO2: 93 % O2 Device: None (Room air)    Weight: 186 lbs 8 oz    HEENT; Atraumatic, normocephalic, pinkish conjuctiva, pupils bilateral reactive   Skin: warm and moist, no rashes  Lungs: equal chest expansion, clear to auscultation, no wheezes, no stridor, no crackles,   Heart: normal rate, normal rhythm, no rubs or gallops.   Abdomen: Positive bowel sounds, diffuse tenderness, no guarding,  Extremities: no deformities, no edema   Neuro; follow commands, alert and oriented x3, spontaneous speech, " coherent, moves all extremities spontaneously  Psych; no hallucination, euthymic mood, not agitated      Medical Decision Making       50 MINUTES SPENT BY ME on the date of service doing chart review, history, exam, documentation & further activities per the note.  MANAGEMENT DISCUSSED with the following over the past 24 hours: Yes   NOTE(S)/MEDICAL RECORDS REVIEWED over the past 24 hours: Yes       Data     I have personally reviewed the following data over the past 24 hrs:    18.1 (H)  \   12.6 (L)   / 263     138 105 13.5 /  105 (H)   4.2 23 0.90 \       Imaging results reviewed over the past 24 hrs:   No results found for this or any previous visit (from the past 24 hour(s)).

## 2023-08-30 NOTE — PLAN OF CARE
End of Shift Summary  For vital signs and complete assessments, please see documentation flowsheets.     Pertinent assessments: A&Ox4. VSS on room air, afebrile. IV dilaudid given x1 for abdominal pain. NPO ex ice, denies nausea. Passing flatus, abd rounded. Up independently.     Major Shift Events: None    Treatment Plan: Antibiotics, pain management     Bedside Nurse: Shaylee Mitchell RN

## 2023-08-31 LAB
ANION GAP SERPL CALCULATED.3IONS-SCNC: 10 MMOL/L (ref 7–15)
BASOPHILS # BLD AUTO: 0 10E3/UL (ref 0–0.2)
BASOPHILS NFR BLD AUTO: 0 %
BUN SERPL-MCNC: 11 MG/DL (ref 8–23)
CALCIUM SERPL-MCNC: 8.1 MG/DL (ref 8.8–10.2)
CHLORIDE SERPL-SCNC: 108 MMOL/L (ref 98–107)
CREAT SERPL-MCNC: 0.81 MG/DL (ref 0.67–1.17)
DEPRECATED HCO3 PLAS-SCNC: 23 MMOL/L (ref 22–29)
EOSINOPHIL # BLD AUTO: 0.1 10E3/UL (ref 0–0.7)
EOSINOPHIL NFR BLD AUTO: 1 %
ERYTHROCYTE [DISTWIDTH] IN BLOOD BY AUTOMATED COUNT: 13.5 % (ref 10–15)
GFR SERPL CREATININE-BSD FRML MDRD: >90 ML/MIN/1.73M2
GLUCOSE BLDC GLUCOMTR-MCNC: 107 MG/DL (ref 70–99)
GLUCOSE BLDC GLUCOMTR-MCNC: 108 MG/DL (ref 70–99)
GLUCOSE BLDC GLUCOMTR-MCNC: 117 MG/DL (ref 70–99)
GLUCOSE BLDC GLUCOMTR-MCNC: 126 MG/DL (ref 70–99)
GLUCOSE BLDC GLUCOMTR-MCNC: 147 MG/DL (ref 70–99)
GLUCOSE BLDC GLUCOMTR-MCNC: 181 MG/DL (ref 70–99)
GLUCOSE SERPL-MCNC: 107 MG/DL (ref 70–99)
HCT VFR BLD AUTO: 35.8 % (ref 40–53)
HGB BLD-MCNC: 12 G/DL (ref 13.3–17.7)
IMM GRANULOCYTES # BLD: 0.1 10E3/UL
IMM GRANULOCYTES NFR BLD: 1 %
LYMPHOCYTES # BLD AUTO: 1.3 10E3/UL (ref 0.8–5.3)
LYMPHOCYTES NFR BLD AUTO: 10 %
MCH RBC QN AUTO: 29.9 PG (ref 26.5–33)
MCHC RBC AUTO-ENTMCNC: 33.5 G/DL (ref 31.5–36.5)
MCV RBC AUTO: 89 FL (ref 78–100)
MONOCYTES # BLD AUTO: 1.1 10E3/UL (ref 0–1.3)
MONOCYTES NFR BLD AUTO: 8 %
NEUTROPHILS # BLD AUTO: 10.1 10E3/UL (ref 1.6–8.3)
NEUTROPHILS NFR BLD AUTO: 80 %
NRBC # BLD AUTO: 0 10E3/UL
NRBC BLD AUTO-RTO: 0 /100
PLATELET # BLD AUTO: 295 10E3/UL (ref 150–450)
POTASSIUM SERPL-SCNC: 3.7 MMOL/L (ref 3.4–5.3)
RBC # BLD AUTO: 4.02 10E6/UL (ref 4.4–5.9)
SODIUM SERPL-SCNC: 141 MMOL/L (ref 136–145)
WBC # BLD AUTO: 12.8 10E3/UL (ref 4–11)

## 2023-08-31 PROCEDURE — 250N000011 HC RX IP 250 OP 636: Mod: JZ | Performed by: INTERNAL MEDICINE

## 2023-08-31 PROCEDURE — 99232 SBSQ HOSP IP/OBS MODERATE 35: CPT | Performed by: INTERNAL MEDICINE

## 2023-08-31 PROCEDURE — 258N000003 HC RX IP 258 OP 636: Performed by: INTERNAL MEDICINE

## 2023-08-31 PROCEDURE — 250N000013 HC RX MED GY IP 250 OP 250 PS 637: Performed by: INTERNAL MEDICINE

## 2023-08-31 PROCEDURE — 120N000001 HC R&B MED SURG/OB

## 2023-08-31 PROCEDURE — 85025 COMPLETE CBC W/AUTO DIFF WBC: CPT | Performed by: INTERNAL MEDICINE

## 2023-08-31 PROCEDURE — 80048 BASIC METABOLIC PNL TOTAL CA: CPT | Performed by: INTERNAL MEDICINE

## 2023-08-31 PROCEDURE — 36415 COLL VENOUS BLD VENIPUNCTURE: CPT | Performed by: INTERNAL MEDICINE

## 2023-08-31 RX ORDER — ACETAMINOPHEN 325 MG/1
650 TABLET ORAL EVERY 4 HOURS PRN
Status: DISCONTINUED | OUTPATIENT
Start: 2023-08-31 | End: 2023-09-01 | Stop reason: HOSPADM

## 2023-08-31 RX ADMIN — DOXAZOSIN 4 MG: 1 TABLET ORAL at 21:06

## 2023-08-31 RX ADMIN — PIPERACILLIN AND TAZOBACTAM 4.5 G: 4; .5 INJECTION, POWDER, FOR SOLUTION INTRAVENOUS at 02:56

## 2023-08-31 RX ADMIN — PIPERACILLIN AND TAZOBACTAM 4.5 G: 4; .5 INJECTION, POWDER, FOR SOLUTION INTRAVENOUS at 21:01

## 2023-08-31 RX ADMIN — FAMOTIDINE 20 MG: 10 INJECTION, SOLUTION INTRAVENOUS at 20:58

## 2023-08-31 RX ADMIN — SODIUM CHLORIDE: 9 INJECTION, SOLUTION INTRAVENOUS at 02:56

## 2023-08-31 RX ADMIN — Medication 1 DROP: at 12:32

## 2023-08-31 RX ADMIN — FINASTERIDE 5 MG: 5 TABLET, FILM COATED ORAL at 09:34

## 2023-08-31 RX ADMIN — SODIUM CHLORIDE: 9 INJECTION, SOLUTION INTRAVENOUS at 14:44

## 2023-08-31 RX ADMIN — FAMOTIDINE 20 MG: 10 INJECTION, SOLUTION INTRAVENOUS at 09:35

## 2023-08-31 RX ADMIN — ACETAMINOPHEN 650 MG: 325 TABLET, FILM COATED ORAL at 16:40

## 2023-08-31 RX ADMIN — ACETAMINOPHEN 650 MG: 325 TABLET, FILM COATED ORAL at 23:13

## 2023-08-31 RX ADMIN — PIPERACILLIN AND TAZOBACTAM 4.5 G: 4; .5 INJECTION, POWDER, FOR SOLUTION INTRAVENOUS at 09:44

## 2023-08-31 RX ADMIN — PIPERACILLIN AND TAZOBACTAM 4.5 G: 4; .5 INJECTION, POWDER, FOR SOLUTION INTRAVENOUS at 14:44

## 2023-08-31 ASSESSMENT — ACTIVITIES OF DAILY LIVING (ADL)
ADLS_ACUITY_SCORE: 18
ADLS_ACUITY_SCORE: 35
ADLS_ACUITY_SCORE: 18

## 2023-08-31 NOTE — PROGRESS NOTES
COLON & RECTAL SURGERY  PROGRESS NOTE    August 31, 2023    SUBJECTIVE:  Patient feeling much better today. Has had 5 bowel movements without blood. +gas. Denies n/v. Bloating has decreased. WBC decreased to 12.8.    OBJECTIVE:  Temp:  [98.3  F (36.8  C)-99.2  F (37.3  C)] 98.4  F (36.9  C)  Pulse:  [] 98  Resp:  [20] 20  BP: (114-163)/(56-82) 135/60  SpO2:  [94 %-96 %] 95 %    Intake/Output Summary (Last 24 hours) at 8/31/2023 0836  Last data filed at 8/31/2023 0618  Gross per 24 hour   Intake 4994 ml   Output --   Net 4994 ml       GENERAL:  Awake, alert, no acute distress, sitting up in his chair  HEAD: Nomocephalic atraumatic  SCLERA: anicteric  EXTREMITIES: warm and well perfused  ABDOMEN:  Soft, mild LLQ tenderness, mildly distended, no rebound or guarding, no peritoneal signs    LABS:  Lab Results   Component Value Date    WBC 12.8 08/31/2023    WBC 8.8 01/27/2019     Lab Results   Component Value Date    HGB 12.0 08/31/2023    HGB 14.3 01/27/2019     Lab Results   Component Value Date    HCT 35.8 08/31/2023    HCT 44.1 01/27/2019     Lab Results   Component Value Date     08/31/2023     01/27/2019     Last Basic Metabolic Panel:  Lab Results   Component Value Date     08/31/2023     01/27/2019      Lab Results   Component Value Date    POTASSIUM 3.7 08/31/2023    POTASSIUM 4.1 01/27/2019     Lab Results   Component Value Date    CHLORIDE 108 08/31/2023    CHLORIDE 108 01/27/2019     Lab Results   Component Value Date    JOSE 8.1 08/31/2023    JOSE 9.1 01/27/2019     Lab Results   Component Value Date    CO2 23 08/31/2023    CO2 26 01/27/2019     Lab Results   Component Value Date    BUN 11.0 08/31/2023    BUN 15 01/27/2019     Lab Results   Component Value Date    CR 0.81 08/31/2023    CR 0.88 01/27/2019     Lab Results   Component Value Date     08/31/2023     08/31/2023    GLC 96 01/27/2019       ASSESSMENT/PLAN: Francisco is a 68 year old man admitted with perforated  diverticulitis with a small amount of intraperitoneal air.      - Clear liquids  - Continue IV antibiotics  - Pain management as needed, minimize narcotics  - No plans for surgery      For questions/paging, please contact the CRS office at [phone].    Ashtyn Lewis PA-C  [facility]  Phone: [phone]

## 2023-08-31 NOTE — PROGRESS NOTES
[facility]    Medicine Progress Note - Hospitalist Service    Date of Admission:  8/28/2023    Assessment & Plan      Francisco Miguel is a 68 year old male admitted on 8/28/2023. He has a PMH significant for prediabetes, BPH, HLD and previous TIA who presents to the ED for abdominal pain radiating to the groin and the left leg.  He went to urgent care on 27 August due to abrupt onset of abdominal pain in the lower abdomen radiating to his left flank.  He has had a previous bout of diverticulitis in June of this year.  He went on to have a CT of the abdomen and pelvis with contrast which showed acute uncomplicated sigmoid diverticulitis.  Also noted was a 2.6 mm solid right lower lobe pulmonary nodule for which follow-up is recommended in 1 month.  He was treated with p.o. Augmentin.  He has been taking his antibiotics however the pain got worse and he had a temperature of 102 last night and then low-grade fever this evening.  Hence he came to the ER for further evaluation.      Sepsis due to complicated diverticulitis with free peritoneal air.  -Appreciate continuous input from colorectal service   -No plans for emergent operative approach   -Continuation of conservative management.  Currently on IV fluid support.  Appears to be tolerating well.  Voiding freely.  No episodes of hypoxia.  No edema.    -Diet advanced to clear liquids.  Appreciate continuous input from CRS  -Optimize pain control.  Improvement with earlier abdominal pain   -Leukocytosis improving and now down to 12.8 K  -Remained afebrile   -Cultures remain no growth up-to-date   -Continued on IV antibiotics with Zosyn     2. Pre DM.  - sliding scale insulin.  -Hold home regimen of metformin    3. Abnormal CT.  - will need follow up CT for 26 mm solid RLL nodule in 1 month as out-pt.         Diet: Clear Liquid Diet    DVT Prophylaxis: Pneumatic Compression Devices and Ambulate every shift  Vitale Catheter: Not present  Lines: None    "  Cardiac Monitoring: None  Code Status: Full Code      Clinically Significant Risk Factors                         # Overweight: Estimated body mass index is 28.36 kg/m  as calculated from the following:    Height as of this encounter: 1.727 m (5' 8\").    Weight as of this encounter: 84.6 kg (186 lb 8 oz)., PRESENT ON ADMISSION            Disposition Plan     Expected Discharge Date: Anticipating team will be requiring inpatient care at least in the next 2-3 more days                 Rashaad Juares MD, MD  Hospitalist Service  [facility]  Securely message with [employer] (more info)  Text page via [facility] Paging/Directory   ______________________________________________________________________    Interval History   Continuing medicine service care today.  Seen and examined.  Chart reviewed.  Family updated this patient's wife present at bedside during my encounter.  Mr. Singh appears to be in good spirits as he mentioned that he has significant improvement with earlier complaints of abdominal pain and sensation of being bloated.  Reportedly passing gas.  Had several BM earlier.  No reported nausea or vomiting.  Currently afebrile.  He is not requiring any oxygen support.  No notable mental status changes overnight.  Still ambulatory with no assistance.        Physical Exam   Vital Signs: Temp: 98.4  F (36.9  C) Temp src: Oral BP: 135/60 Pulse: 98   Resp: 20 SpO2: 95 % O2 Device: None (Room air)    Weight: 186 lbs 8 oz    HEENT; Atraumatic, normocephalic, pinkish conjuctiva, pupils bilateral reactive   Skin: warm and moist, no rashes  Lungs: equal chest expansion, clear to auscultation, no wheezes, no stridor, no crackles,   Heart: normal rate, normal rhythm, no rubs or gallops.   Abdomen: Positive bowel sounds, diffuse tenderness, no guarding,  Extremities: no deformities, no edema   Neuro; follow commands, alert and oriented x3, spontaneous speech, coherent, moves all extremities " spontaneously  Psych; no hallucination, euthymic mood, not agitated      Medical Decision Making       50 MINUTES SPENT BY ME on the date of service doing chart review, history, exam, documentation & further activities per the note.  MANAGEMENT DISCUSSED with the following over the past 24 hours: Yes   NOTE(S)/MEDICAL RECORDS REVIEWED over the past 24 hours: Yes       Data     I have personally reviewed the following data over the past 24 hrs:    12.8 (H)  \   12.0 (L)   / 295     141 108 (H) 11.0 /  108 (H)   3.7 23 0.81 \     Imaging results reviewed over the past 24 hrs:   No results found for this or any previous visit (from the past 24 hour(s)).

## 2023-08-31 NOTE — PLAN OF CARE
End of Shift Summary  For vital signs and complete assessments, please see documentation flowsheets.     Pertinent assessments: VSS. Afebrile. LS clear. BS x4, abdomen is rounded and slightly distended; pt is having non-bloody formed stools; slight tenderness and bloating noted. Pt denies pain and nausea. NPO with meds, BG were 136 and 117. Independently ambulating. PIV - IVF. A&O, calls appropriately for assistance. Slept well.    Major Shift Events: none    Treatment Plan: Zosyn, Advance and tolerate diet, Encourage activity, Discharge TBD.

## 2023-08-31 NOTE — PLAN OF CARE
Patient is A&Ox4, up independently in room, currently denying the need for pain meds, abdomen slightly distended, NPO. IVF @ 100, intermittent IVabx, colorectal following, will continue to monitor    Vital signs:  Temp: 98.6  F (37  C) Temp src: Oral BP: (!) 160/82 Pulse: 102   Resp: 20 SpO2: 96 % O2 Device: None (Room air)

## 2023-08-31 NOTE — PROGRESS NOTES
To Do:  End of Shift Summary  For vital signs and complete assessments, please see documentation flowsheets.     Pertinent assessments: Pt is alert and oriented x4. BS audible and hypoactive x4. Abdomen is rounded and soft. Slight bloating. Pt denies nausea and rates pain as a 2. Full liquid diet. Passing flatus and multiple stools today. Up independently ambulating. IVF and zosyn. PRN eye drops for irritation. BS q4.     Major Shift Events: none    Treatment Plan: IVF and Zosyn, advance diet as tolerated, continue ambulation.     Bedside Nurse: Marcela Salamanca RN

## 2023-09-01 VITALS
HEIGHT: 68 IN | BODY MASS INDEX: 28.26 KG/M2 | TEMPERATURE: 99.1 F | OXYGEN SATURATION: 96 % | RESPIRATION RATE: 20 BRPM | WEIGHT: 186.5 LBS | DIASTOLIC BLOOD PRESSURE: 72 MMHG | SYSTOLIC BLOOD PRESSURE: 135 MMHG | HEART RATE: 102 BPM

## 2023-09-01 LAB
ANION GAP SERPL CALCULATED.3IONS-SCNC: 7 MMOL/L (ref 7–15)
BASOPHILS # BLD AUTO: 0 10E3/UL (ref 0–0.2)
BASOPHILS NFR BLD AUTO: 0 %
BUN SERPL-MCNC: 6 MG/DL (ref 8–23)
CALCIUM SERPL-MCNC: 8.7 MG/DL (ref 8.8–10.2)
CHLORIDE SERPL-SCNC: 106 MMOL/L (ref 98–107)
CREAT SERPL-MCNC: 0.79 MG/DL (ref 0.67–1.17)
DEPRECATED HCO3 PLAS-SCNC: 25 MMOL/L (ref 22–29)
EOSINOPHIL # BLD AUTO: 0.2 10E3/UL (ref 0–0.7)
EOSINOPHIL NFR BLD AUTO: 2 %
ERYTHROCYTE [DISTWIDTH] IN BLOOD BY AUTOMATED COUNT: 13.7 % (ref 10–15)
GFR SERPL CREATININE-BSD FRML MDRD: >90 ML/MIN/1.73M2
GLUCOSE BLDC GLUCOMTR-MCNC: 111 MG/DL (ref 70–99)
GLUCOSE BLDC GLUCOMTR-MCNC: 125 MG/DL (ref 70–99)
GLUCOSE BLDC GLUCOMTR-MCNC: 126 MG/DL (ref 70–99)
GLUCOSE SERPL-MCNC: 115 MG/DL (ref 70–99)
HCT VFR BLD AUTO: 37.4 % (ref 40–53)
HGB BLD-MCNC: 12.5 G/DL (ref 13.3–17.7)
IMM GRANULOCYTES # BLD: 0.1 10E3/UL
IMM GRANULOCYTES NFR BLD: 1 %
LYMPHOCYTES # BLD AUTO: 1.4 10E3/UL (ref 0.8–5.3)
LYMPHOCYTES NFR BLD AUTO: 13 %
MCH RBC QN AUTO: 29.8 PG (ref 26.5–33)
MCHC RBC AUTO-ENTMCNC: 33.4 G/DL (ref 31.5–36.5)
MCV RBC AUTO: 89 FL (ref 78–100)
MONOCYTES # BLD AUTO: 1 10E3/UL (ref 0–1.3)
MONOCYTES NFR BLD AUTO: 9 %
NEUTROPHILS # BLD AUTO: 7.6 10E3/UL (ref 1.6–8.3)
NEUTROPHILS NFR BLD AUTO: 75 %
NRBC # BLD AUTO: 0 10E3/UL
NRBC BLD AUTO-RTO: 0 /100
PLATELET # BLD AUTO: 323 10E3/UL (ref 150–450)
POTASSIUM SERPL-SCNC: 4.2 MMOL/L (ref 3.4–5.3)
RBC # BLD AUTO: 4.19 10E6/UL (ref 4.4–5.9)
SODIUM SERPL-SCNC: 138 MMOL/L (ref 136–145)
WBC # BLD AUTO: 10.3 10E3/UL (ref 4–11)

## 2023-09-01 PROCEDURE — 258N000003 HC RX IP 258 OP 636: Performed by: INTERNAL MEDICINE

## 2023-09-01 PROCEDURE — 250N000013 HC RX MED GY IP 250 OP 250 PS 637: Performed by: INTERNAL MEDICINE

## 2023-09-01 PROCEDURE — 36415 COLL VENOUS BLD VENIPUNCTURE: CPT | Performed by: INTERNAL MEDICINE

## 2023-09-01 PROCEDURE — 250N000011 HC RX IP 250 OP 636: Mod: JZ | Performed by: INTERNAL MEDICINE

## 2023-09-01 PROCEDURE — 99239 HOSP IP/OBS DSCHRG MGMT >30: CPT | Performed by: INTERNAL MEDICINE

## 2023-09-01 PROCEDURE — 80048 BASIC METABOLIC PNL TOTAL CA: CPT | Performed by: INTERNAL MEDICINE

## 2023-09-01 PROCEDURE — 85025 COMPLETE CBC W/AUTO DIFF WBC: CPT | Performed by: INTERNAL MEDICINE

## 2023-09-01 RX ADMIN — ACETAMINOPHEN 650 MG: 325 TABLET, FILM COATED ORAL at 09:51

## 2023-09-01 RX ADMIN — Medication 1 DROP: at 09:28

## 2023-09-01 RX ADMIN — FAMOTIDINE 20 MG: 10 INJECTION, SOLUTION INTRAVENOUS at 09:34

## 2023-09-01 RX ADMIN — PIPERACILLIN AND TAZOBACTAM 4.5 G: 4; .5 INJECTION, POWDER, FOR SOLUTION INTRAVENOUS at 03:52

## 2023-09-01 RX ADMIN — FINASTERIDE 5 MG: 5 TABLET, FILM COATED ORAL at 09:34

## 2023-09-01 RX ADMIN — PIPERACILLIN AND TAZOBACTAM 4.5 G: 4; .5 INJECTION, POWDER, FOR SOLUTION INTRAVENOUS at 09:42

## 2023-09-01 RX ADMIN — AMOXICILLIN AND CLAVULANATE POTASSIUM 1 TABLET: 875; 125 TABLET, FILM COATED ORAL at 11:33

## 2023-09-01 RX ADMIN — SODIUM CHLORIDE: 9 INJECTION, SOLUTION INTRAVENOUS at 03:52

## 2023-09-01 ASSESSMENT — ACTIVITIES OF DAILY LIVING (ADL)
ADLS_ACUITY_SCORE: 18

## 2023-09-01 NOTE — PROGRESS NOTES
COLON & RECTAL SURGERY  PROGRESS NOTE    September 1, 2023    SUBJECTIVE:  Continues to improve. Not much pain. Feels less bloated. Passing flatus and having Bms without blood. Tolerating full liquids without n/v. WBC 10.3.    OBJECTIVE:  Temp:  [97.8  F (36.6  C)-99.9  F (37.7  C)] 99.1  F (37.3  C)  Pulse:  [] 102  Resp:  [20] 20  BP: (125-150)/(54-72) 135/72  SpO2:  [93 %-97 %] 96 %    Intake/Output Summary (Last 24 hours) at 9/1/2023 0835  Last data filed at 8/31/2023 1800  Gross per 24 hour   Intake 240 ml   Output --   Net 240 ml       GENERAL:  Awake, alert, no acute distress, sitting up in his chair  HEAD: Nomocephalic atraumatic  SCLERA: anicteric  EXTREMITIES: warm and well perfused  ABDOMEN:  Soft, mildly tender in LLQ, distended, no rebound or guarding, no peritoneal signs    LABS:  Lab Results   Component Value Date    WBC 10.3 09/01/2023    WBC 8.8 01/27/2019     Lab Results   Component Value Date    HGB 12.5 09/01/2023    HGB 14.3 01/27/2019     Lab Results   Component Value Date    HCT 37.4 09/01/2023    HCT 44.1 01/27/2019     Lab Results   Component Value Date     09/01/2023     01/27/2019     Last Basic Metabolic Panel:  Lab Results   Component Value Date     09/01/2023     01/27/2019      Lab Results   Component Value Date    POTASSIUM 4.2 09/01/2023    POTASSIUM 4.1 01/27/2019     Lab Results   Component Value Date    CHLORIDE 106 09/01/2023    CHLORIDE 108 01/27/2019     Lab Results   Component Value Date    JOSE 8.7 09/01/2023    JOSE 9.1 01/27/2019     Lab Results   Component Value Date    CO2 25 09/01/2023    CO2 26 01/27/2019     Lab Results   Component Value Date    BUN 6.0 09/01/2023    BUN 15 01/27/2019     Lab Results   Component Value Date    CR 0.79 09/01/2023    CR 0.88 01/27/2019     Lab Results   Component Value Date     09/01/2023     09/01/2023    GLC 96 01/27/2019       ASSESSMENT/PLAN: Francisco is a 68 year old man admitted with perforated  diverticulitis with a small amount of intraperitoneal air.      - Low fiber diet  - Transition to oral antibiotics  - Pain management as needed, minimize narcotics  - No plans for surgery      For questions/paging, please contact the CRS office at [phone].    Ashtyn Lewis PA-C  [facility]  Phone: [phone]      [facility] Staff  I performed a history and physical examination of the patient and discussed their management with the physician assistant. I reviewed the physician assistants note and agree with the documented findings and plan of care.     Feeling much better.  White count normalized.  Afebrile and tachycardia improved.  No significant abd pain and having BMs. Had LFD for breakfast without issues    Alert, NAD  Abd soft, mild distention, NT    Plan:   Ok to transition to po ABx  Can discharge home this afternoon if cont to do well with LFD    Janine Lane MD, FACS    [facility]  [address]  [place] [address]  T: [phone]  F: [phone]

## 2023-09-01 NOTE — PLAN OF CARE
Discharge Note    Patient discharged to home via private vehicle  accompanied by friend.  IV: Discontinued  Prescriptions filled and given to patient/family.   Belongings reviewed and sent with patient.   Home medications returned to patient: NA  Equipment sent with: N/A.   patient verbalizes understanding of discharge instructions. AVS given to patient.  Additional education completed?  Low fiber diet.  Rachel Vázquez RN on 9/1/2023 at 4:31 PM

## 2023-09-01 NOTE — PLAN OF CARE
Goal Outcome Evaluation:      Pt A/O x4. VSS,, on RA. Up independently in the room. Complains of pain mainly on his back, nothing over abdomen, tylenol given. Passing gas, no bm during shift. Tolerated full liquid. No n/v. Abd distended. BS active. PIV running 75 ml/hr. Slept well.    Treatment Plan: continue Zosyn, pain management, CRS following    Bedside Nurse: Rosetta Sepulveda RN

## 2023-09-01 NOTE — DISCHARGE SUMMARY
"[facility]  Hospitalist Discharge Summary      Date of Admission:  8/28/2023  Date of Discharge:  9/1/2023  Discharging Provider: Rashaad Juares MD, MD  Discharge Service: Hospitalist Service    Discharge Diagnoses   Sepsis due to complicated diverticulitis with perforation  History of prediabetes  History of right lower lung nodule (needs close follow-up with PCP)    Clinically Significant Risk Factors     # Overweight: Estimated body mass index is 28.36 kg/m  as calculated from the following:    Height as of this encounter: 1.727 m (5' 8\").    Weight as of this encounter: 84.6 kg (186 lb 8 oz).       Follow-ups Needed After Discharge   Follow-up Appointments     Follow-up and recommended labs and tests       Follow up with primary care provider, Sulaiman Perales, within 7 days   to evaluate medication change, to evaluate treatment change, and for   hospital follow- up.    Follow-up with colorectal service as scheduled            Unresulted Labs Ordered in the Past 30 Days of this Admission       Date and Time Order Name Status Description    8/29/2023 12:10 AM Blood Culture Peripheral Blood Preliminary     8/29/2023 12:10 AM Blood Culture Peripheral Blood Preliminary             Discharge Disposition   Discharged to home  Condition at discharge: Stable    Hospital Course     Continuing medicine service care today.  I have seen this a very pleasant gentleman this morning while he is ambulating inside his room.  He mentioned no ongoing complaints.  Able to tolerate advancement of diet to low fiber diet this morning.  Still passing flatus and having bowel movement.  No bleeding tendencies.  Denies any nausea and vomiting had earlier some mild discomfort but not requiring any narcotics.  No reported chest pain or shortness of breath.  No fever spikes.  Not requiring oxygen support.  Hopeful for home discharge.  He is voiding freely.  He is being optimized from colorectal surgery " perspective.  Transition to oral antibiotics.  Already on low fiber diet.  Leukocytosis has resolved.  We will proceed with home discharge if he continues to demonstrate tolerance to oral diet later today.  Continuation of oral antibiotics upon discharge.          Francisco Miguel is a 68 year old male admitted on 8/28/2023. He has a PMH significant for prediabetes, BPH, HLD and previous TIA who presents to the ED for abdominal pain radiating to the groin and the left leg.  He went to urgent care on 27 August due to abrupt onset of abdominal pain in the lower abdomen radiating to his left flank.  He has had a previous bout of diverticulitis in June of this year.  He went on to have a CT of the abdomen and pelvis with contrast which showed acute uncomplicated sigmoid diverticulitis.  Also noted was a 2.6 mm solid right lower lobe pulmonary nodule for which follow-up is recommended in 1 month.  He was treated with p.o. Augmentin.  He has been taking his antibiotics however the pain got worse and he had a temperature of 102 last night and then low-grade fever this evening.  Hence he came to the ER for further evaluation.      Sepsis due to complicated diverticulitis with free peritoneal air.  -Appreciate continuous input from colorectal service   -No plans for emergent operative approach   -Continuation of conservative management.  Currently on IV fluid support.  Appears to be tolerating well.  Voiding freely.  No episodes of hypoxia.  No edema.    -Diet advanced to clear liquids.  Appreciate continuous input from CRS  -Optimize pain control.  Improvement with earlier abdominal pain   -Leukocytosis improving and now down to 12.8 K  -Remained afebrile   -Cultures remain no growth up-to-date   -Continued on IV antibiotics with Zosyn     2. Pre DM.  - sliding scale insulin.  -Hold home regimen of metformin    3. Abnormal CT.  - will need follow up CT for 26 mm solid RLL nodule in 1 month as out-pt.      Consultations This  Hospital Stay   COLORECTAL SURGERY IP CONSULT    Code Status   Full Code    Time Spent on this Encounter   I, Rashaad Juares MD, MD, personally saw the patient today and spent greater than 30 minutes discharging this patient.       Rashaad Juares MD, MD  [place] [address] [facility]  [address]  [facility] [address]  Phone: [phone]  Fax: [phone]  ______________________________________________________________________    Physical Exam   Vital Signs: Temp: 99.1  F (37.3  C) Temp src: Oral BP: 135/72 Pulse: 102   Resp: 20 SpO2: 96 % O2 Device: None (Room air)    Weight: 186 lbs 8 oz  HEENT; Atraumatic, normocephalic, pinkish conjuctiva, pupils bilateral reactive   Skin: warm and moist, no rashes  Lymphatics: no cervical or axillary lymphandenopathy  Lungs: equal chest expansion, clear to auscultation, no wheezes, no stridor, no crackles,   Heart: normal rate, normal rhythm, no rubs or gallops.   Abdomen: normal bowel sounds, no tenderness, no peritoneal signs, no guarding  Extremities: no deformities, no edema   Neuro; follow commands, alert and oriented x3, spontaneous speech, coherent, moves all extremities spontaneously  Psych; no hallucination, euthymic mood, not agitated         Primary Care Physician   Sulaiman Perales    Discharge Orders      Reason for your hospital stay    Francisco is a very pleasant gentleman who presented here due to nausea, vomiting, abdominal pain found with complicated diverticulitis with contained perforation.  He fortunately responded with conservative approach with IV fluid support, optimization of pain control and IV antibiotics.  Currently tolerating advancement of diet.  No plans for operative intervention care of colorectal service.  He remained hemodynamically stable.  No reported bleeding tendencies.  No nausea or vomiting.  Ambulatory.  Not requiring oxygen support.  Will proceed with home discharge and continuation of oral  antibiotics upon discharge.     Follow-up and recommended labs and tests     Follow up with primary care provider, Sulaiman Perales, within 7 days to evaluate medication change, to evaluate treatment change, and for hospital follow- up.    Follow-up with colorectal service as scheduled  Franciscoganesh a close follow-up care with incidental finding of right lower lung nodule seen on outside facility CT scan prior to this hospitalization     Activity    Your activity upon discharge: activity as tolerated     Full Code     Diet    Follow this diet upon discharge: Orders Placed This Encounter      Low Fiber Diet       Significant Results and Procedures   Most Recent 3 CBC's:  Recent Labs   Lab Test 09/01/23  0635 08/31/23  0647 08/30/23  0550   WBC 10.3 12.8* 18.1*   HGB 12.5* 12.0* 12.6*   MCV 89 89 90    295 263     Most Recent 3 BMP's:  Recent Labs   Lab Test 09/01/23  0818 09/01/23 0635 09/01/23  0445 08/31/23  0807 08/31/23  0647 08/30/23  0829 08/30/23  0550   NA  --  138  --   --  141  --  138   POTASSIUM  --  4.2  --   --  3.7  --  4.2   CHLORIDE  --  106  --   --  108*  --  105   CO2  --  25  --   --  23  --  23   BUN  --  6.0*  --   --  11.0  --  13.5   CR  --  0.79  --   --  0.81  --  0.90   ANIONGAP  --  7  --   --  10  --  10   JOSE  --  8.7*  --   --  8.1*  --  8.1*   * 115* 111*   < > 107*   < > 133*    < > = values in this interval not displayed.     Most Recent 2 LFT's:  Recent Labs   Lab Test 08/28/23 2306 06/20/23  0940   AST 35 31   ALT 27 34   ALKPHOS 173* 161*   BILITOTAL 0.8 0.2     7-Day Micro Results       Collected Updated Procedure Result Status      08/29/2023 0037 09/01/2023 0231 Blood Culture Peripheral Blood [[id number]]   Peripheral Blood    Preliminary result Component Value   Culture No growth after 3 days  [P]                08/29/2023 0037 09/01/2023 0231 Blood Culture Peripheral Blood [[id number]]   Peripheral Blood    Preliminary result Component Value   Culture No  growth after 3 days  [P]                      Most Recent Hemoglobin A1c:No lab results found.  Most Recent 6 glucoses:  Recent Labs   Lab Test 09/01/23  0818 09/01/23  0635 09/01/23  0445 09/01/23  0007 08/31/23 2009 08/31/23  1645   * 115* 111* 125* 181* 147*     Most Recent Urinalysis:No lab results found.,   Results for orders placed or performed during the hospital encounter of 08/28/23   CT Abdomen Pelvis w Contrast    Narrative    EXAM: CT ABDOMEN PELVIS W CONTRAST  LOCATION: [facility]  DATE: 8/29/2023    INDICATION: Diverticulitis, now with worsening pain.  COMPARISON: CT abdomen and pelvis on 08/27/2023.  TECHNIQUE: CT scan of the abdomen and pelvis was performed after the injection of 93 ml Isovue 370 intravenously. Multiplanar reformats were obtained. Dose reduction techniques were used.    FINDINGS:   LOWER CHEST: Mild basilar pulmonary opacities, likely atelectasis.    ABDOMEN/PELVIS:    HEPATOBILIARY: No suspicious focal hepatic lesion. No radiodense gallstones.    PANCREAS: No main pancreatic ductal dilatation or definite solid pancreatic mass.    SPLEEN: No splenomegaly.    ADRENAL GLANDS: No adrenal nodules.    KIDNEYS/BLADDER: No radiodense kidney/ureteral stones or hydronephrosis in either kidney.    BOWEL: The appendix is visualized and appears normal. Colonic diverticulosis predominantly of the sigmoid colon with mild pericolonic fat stranding, findings worrisome for acute diverticulitis. Few mildly prominent small bowel loops in the left mid   abdomen, could represent focal ileus related to the adjacent inflammation.    PERITONEUM: Multiple foci of free peritoneal air, new as compared to 08/27/2023 exam, worrisome for colonic perforation. No significant change in the approximately 3.7 x 2.2 cm peripherally calcified hypodense area anterior to the diaphragmatic naomi   (series 3 image 46).    PELVIC ORGANS: Unremarkable.    VASCULATURE: Unremarkable.    LYMPH  NODES: No significant abdominopelvic lymphadenopathy.    MUSCULOSKELETAL: No suspicious osseous lesion.      Impression    IMPRESSION:   1. Colonic diverticulosis with pericolonic fat stranding along the sigmoid colon, findings worrisome for acute diverticulitis, not significantly changed as compared to 08/27/2023 exam. However, there is new foci of free peritoneal air, suggesting   interval colonic perforation.       Discharge Medications   Current Discharge Medication List        CONTINUE these medications which have CHANGED    Details   amoxicillin-clavulanate (AUGMENTIN) 875-125 MG tablet Take 1 tablet by mouth 2 times daily for 10 days  Qty: 20 tablet, Refills: 0    Associated Diagnoses: Diverticulitis of colon with perforation           CONTINUE these medications which have NOT CHANGED    Details   aspirin 81 MG EC tablet Take 1 tablet (81 mg) by mouth daily  Qty: 30 tablet, Refills: 0    Associated Diagnoses: TIA (transient ischemic attack); History of TIA (transient ischemic attack) and stroke      atorvastatin (LIPITOR) 20 MG tablet Take 20 mg by mouth daily      EPINEPHrine (ANY BX GENERIC EQUIV) 0.3 MG/0.3ML injection 2-pack Inject 0.3 mLs (0.3 mg) into the muscle once as needed for anaphylaxis May repeat one time in 5-15 minutes if response to initial dose is inadequate.  Qty: 2 each, Refills: 0      escitalopram (LEXAPRO) 5 MG tablet Take 5 mg by mouth daily      finasteride (PROSCAR) 5 MG tablet Take 5 mg by mouth daily      fish oil-omega-3 fatty acids 1000 MG capsule Take 2 g by mouth daily      metFORMIN (GLUCOPHAGE XR) 500 MG 24 hr tablet Take 500 mg by mouth every morning      doxazosin (CARDURA) 4 MG tablet Take 4 mg by mouth At Bedtime           Allergies   Allergies   Allergen Reactions    Ciprofloxacin Angioedema     Lip swelling, throat fullness, nausea.      Metronidazole Angioedema     Lip swelling, throat fullness, nausea

## 2023-09-01 NOTE — PLAN OF CARE
Assessments:  A/O x4. Up independently, ambulated in hallway multiple times. Complains of pain mainly on his back, nothing over abdomen. Temp of 99.8F, Tylenol given which was effective. Passing gas, no bm during shift. Tolerated full liquid. No n/v.   and 181.    Treatment Plan: continue Zosyn, pain management, CRS following    Bedside Nurse: Anson Logan RN

## 2023-09-02 ENCOUNTER — PATIENT OUTREACH (OUTPATIENT)
Dept: CARE COORDINATION | Facility: CLINIC | Age: 68
End: 2023-09-02
Payer: COMMERCIAL

## 2023-09-02 NOTE — PROGRESS NOTES
[facility]: Post-Discharge Note  SITUATION                                                      Admission:    Admission Date: 08/28/23   Reason for Admission: abdominal pain radiating to the groin and the left leg  Discharge:   Discharge Date: 09/01/23  Discharge Diagnosis: Sepsis due to complicated diverticulitis with perforation    BACKGROUND                                                    Continuing medicine service care today. I have seen this a very pleasant gentleman this morning while he is ambulating inside his room. He mentioned no ongoing complaints. Able to tolerate advancement of diet to low fiber diet this morning. Still passing flatus and having bowel movement. No bleeding tendencies. Denies any nausea and vomiting had earlier some mild discomfort but not requiring any narcotics. No reported chest pain or shortness of breath. No fever spikes. Not requiring oxygen support. Hopeful for home discharge. He is voiding freely. He is being optimized from colorectal surgery perspective. Transition to oral antibiotics. Already on low fiber diet. Leukocytosis has resolved. We will proceed with home discharge if he continues to demonstrate tolerance to oral diet later today. Continuation of oral antibiotics upon discharge.           Per hospital discharge summary and inpatient provider notes:   Francisco Miguel is a 68 year old male admitted on 8/28/2023. He has a PMH significant for prediabetes, BPH, HLD and previous TIA who presents to the ED for abdominal pain radiating to the groin and the left leg.  He went to urgent care on 27 August due to abrupt onset of abdominal pain in the lower abdomen radiating to his left flank.  He has had a previous bout of diverticulitis in June of this year.  He went on to have a CT of the abdomen and pelvis with contrast which showed acute uncomplicated sigmoid diverticulitis.  Also noted was a 2.6 mm solid right lower lobe pulmonary nodule for which follow-up is recommended  in 1 month.  He was treated with p.o. Augmentin.  He has been taking his antibiotics however the pain got worse and he had a temperature of 102 last night and then low-grade fever this evening.  Hence he came to the ER for further evaluation.       Sepsis due to complicated diverticulitis with free peritoneal air.  -Appreciate continuous input from colorectal service   -No plans for emergent operative approach   -Continuation of conservative management.  Currently on IV fluid support.  Appears to be tolerating well.  Voiding freely.  No episodes of hypoxia.  No edema.    -Diet advanced to clear liquids.  Appreciate continuous input from CRS  -Optimize pain control.  Improvement with earlier abdominal pain   -Leukocytosis improving and now down to 12.8 K  -Remained afebrile   -Cultures remain no growth up-to-date   -Continued on IV antibiotics with Zosyn      2. Pre DM.  - sliding scale insulin.  -Hold home regimen of metformin     3. Abnormal CT.  - will need follow up CT for 26 mm solid RLL nodule in 1 month as out-pt.      ASSESSMENT        Discharge Assessment  How are you doing now that you are home?: Getting better.  How are your symptoms? (Red Flag symptoms escalate to triage hotline per guidelines): Improved  Do you feel your condition is stable enough to be safe at home until your provider visit?: Yes  Does the patient have their discharge instructions? : Yes  Does the patient have questions regarding their discharge instructions? : No  Were you started on any new medications or were there changes to any of your previous medications? : Yes  Does the patient have all of their medications?: Yes  Do you have questions regarding any of your medications? : No  Do you have all of your needed medical supplies or equipment (DME)?  (i.e. oxygen tank, CPAP, cane, etc.): Yes  Discharge follow-up appointment scheduled within 14 calendar days? : Yes  Discharge Follow Up Appointment Date: 09/13/23  Discharge Follow Up  Appointment Scheduled with?: Specialty Care Provider    Post-op (W CTA Only)  If the patient had a surgery or procedure, do they have any questions for a nurse?: No      PLAN                                                      Outpatient Plan:      Follow up with primary care provider, Sulaiman Perales, within 7 days to evaluate medication change, to evaluate treatment change, and for hospital follow- up.    Follow-up with colorectal service as scheduled  Alcira a close follow-up care with incidental finding of right lower lung nodule seen on outside facility CT scan prior to this hospitalization       Future Appointments   Date Time Provider Department Center   9/13/2023  2:00 PM Rachel Mckeon, APRN CNP [facility]         For any urgent concerns, please contact our 24 hour nurse triage line: [phone])         ENIO Barakat  [phone]  [facility]

## 2023-09-03 LAB
BACTERIA BLD CULT: NO GROWTH
BACTERIA BLD CULT: NO GROWTH

## 2023-09-07 ENCOUNTER — TRANSCRIBE ORDERS (OUTPATIENT)
Dept: GASTROENTEROLOGY | Facility: CLINIC | Age: 68
End: 2023-09-07
Payer: COMMERCIAL

## 2023-09-07 DIAGNOSIS — K57.32 DIVERTICULITIS OF COLON: Primary | ICD-10-CM

## 2023-09-12 NOTE — PROGRESS NOTES
Francisco is a 68 year old who is being evaluated via a billable video visit.      Video-Visit Details    Type of service:  Video Visit   Video Start Time: 2:00 PM  Video End Time:2:14 PM    Originating Location (pt. Location): Home  Distant Location (provider location):  On-site  Platform used for Video Visit: [employer] Interactive  __________________________________________________________    ___________________________________  ESTABLISHED PATIENT NEUROLOGY NOTE    DATE OF VISIT: 9/13/2023  MRN: [id number]  PATIENT NAME: Francisco Miguel  YOB: 1955    Chief Complaint: Patient presents with:  Stroke: No concerns    SUBJECTIVE:                                                      History of Present Illness: Francisco Miguel is a 68 year old male presenting for follow-up for TIA.     He was hospitalized at [facility] on 07/05/23 - 07/06/23. Prior to the hospital stay, he had a past medical history of HLD, pre-DM, Prior TIA in 2007 in [place].  On PTA Lipitor 10 mg daily, not on PTA aspirin.    He presented to the hospital with dizziness, decreased coordination R arm and right arm and leg weakness.  /80.  He started on aspirin and Plavix and admitted for stroke work-up. .     Stroke Evaluation summarized:    MRI and/or Head CT  MRI: Negative for acute infarct, chronic SVID  CTh: Negative for acute pathology   Intracranial Vasculature  CTA head: Unremarkable   Cervical Vasculature  CTA neck: Presumed atherosclerotic disease aortic arch and origins of great vessels without significant stenosis      Echocardiogram  TTE: LV normal, RV normal, normal bilateral atria, SR   EKG/Telemetry  NSR   Other Testing Not Applicable       LDL 7/5/2023: 73 mg/dL   A1C  [facility] 6/8/2023 5.6%          ABCD2 Patients Score   Age ? 60 years 1 point 1   Blood Pressure    SBP ? 140 or DBP ?  90     1 point 1   Clinical Features    - Unilateral weakness    - Speech disturbance w/o weakness    - Other    2 points  1 point     0 points  2   Duration of symptoms    ? 60 minutes    10-59 minutes    < 10 minutes    2 points  1 point  0 points 2   Diabetes  1 point 0   Patient s ABCD2 Score (0-7) = 6     Impression  Transient dizziness/lightheadedness, shortness of breath, gait instability, and questionable slight R arm weakness, lower suspicion but possible transient ischemic attack (TIA), ABCD2 score 6, possibly secondary to small vessel disease given vascular risk factors although they are well controlled, rule out cardioembolic etiology including arrhythmias that could cause dizziness/lightheadedness    History of TIA 2007 in [place]     Recommendations    -Aspirin 81 mg daily indefinitely  -Given questionable symptoms would not add additional Plavix at this time    09/13/23: Francisco presents to the clinic today virtually for his post TIA follow-up.  He is accompanied by his wife, Stacey.  Francisco states his symptoms have fully resolved and denies any reoccurrence of symptoms since his hospitalization.  He was recently hospitalized last week for 5 days for diverticulitis.  At that time he was placed on Lexapro for anxiety which he feels has been beneficial.    Cardiac event monitor without atrial fibrillation.  Patient continues to take aspirin as prescribed.  LDL and A1c are well controlled on medication.  Patient has a diagnosis of sleep apnea and started wearing his CPAP in July.  He reports it is made a huge difference and he sleeping much better.  Past smoking history, he quit 8 years ago.  No history of hypertension.    Patient has no other concerns.     Current Prevention Medications:    Aspirin 81 mg tablet daily  Metformin  Atorvastatin    Perceived Side Effects: No     Psycho-Social History: Francisco Miguel currently lives [place] with Wife and son. Highest level of education Some college Employment status:Retired, assembly for [employer]    Patient does not smoke- quit 8 years ago, no alcohol use, no recreational drug use.  Currently, patient  denies feeling depressed, denies feeling anhedonia, denies suicidal  thoughts, and denies having feelings of excessive guilt/worthlessness.  We reviewed importance of mental and emotional wellbeing and impact on health.      Current Medications:   aspirin 81 MG EC tablet, Take 1 tablet (81 mg) by mouth daily  atorvastatin (LIPITOR) 20 MG tablet, Take 20 mg by mouth daily  doxazosin (CARDURA) 4 MG tablet, Take 4 mg by mouth At Bedtime  EPINEPHrine (ANY BX GENERIC EQUIV) 0.3 MG/0.3ML injection 2-pack, Inject 0.3 mLs (0.3 mg) into the muscle once as needed for anaphylaxis May repeat one time in 5-15 minutes if response to initial dose is inadequate.  escitalopram (LEXAPRO) 5 MG tablet, Take 5 mg by mouth daily  finasteride (PROSCAR) 5 MG tablet, Take 5 mg by mouth daily  fish oil-omega-3 fatty acids 1000 MG capsule, Take 2 g by mouth daily  metFORMIN (GLUCOPHAGE XR) 500 MG 24 hr tablet, Take 500 mg by mouth every morning    No current facility-administered medications on file prior to visit.    Past Medical History:   Patient  has a past medical history of Diabetes (H), High cholesterol, and TIA (transient ischemic attack).  Surgical History:  He  has a past surgical history that includes orthopedic surgery.  Family and Social History:  Reviewed, and he    Reviewed, and family history is not on file.    RECENT DIAGNOSTIC STUDIES:   Labs:    Coagulation studies:  Recent Labs   Lab Test 07/05/23  1033   INR 0.95        Lipid panel:  Recent Labs   Lab Test 07/05/23  1249   CHOL 132   HDL 44   LDL 73   TRIG 77       HbA1C:  No lab results found.    Troponin:  No lab results found.  No lab results found.  No lab results found.    Imaging:   See HPI  Cardiac event monitor  -Sinus rhythm with occasional PVC     REVIEW OF SYSTEMS:                                                      10-point review of systems is negative except as mentioned above in HPI.    EXAM:                                                      Physical  Exam:   Vitals: No vitals were obtained today due to virtual visit.     MENTAL STATUS: Alert, attentive.   GENERAL: Healthy, alert and no distress  EYES: Eyes grossly normal to inspection.  No discharge   RESP: No audible wheeze, cough, or visible cyanosis.  No visible retractions or increased work of breathing.    MS: No gross musculoskeletal defects noted and normal range of motion of the upper extremities. normal finger-to-nose without dysmetria, rapid alternating movements symmetric  SKIN: Visible skin clear. No significant rash, abnormal pigmentation or lesions to face or neck.  NEURO: Cranial nerves grossly intact.  Mentation and speech appropriate for age. Speech is fluent. Normal comprehension. Normal concentration. Adequate fund of knowledge. EOMI with normal smooth pursuit, facial movements symmetric, hearing not formally tested but intact to conversation, no dysarthria, shoulder shrug equal bilaterally, tongue protrusion midline  PSYCH: Mentation appears normal, affect normal/bright, judgement and insight intact, normal speech and appearance well-groomed.     ASSESSMENT and PLAN:                                                      Assessment:    ICD-10-CM    1. TIA (transient ischemic attack)  G45.9 Stroke Hospital Follow Up (for neurologist use only)      2. History of TIA (transient ischemic attack) and stroke  Z86.73 Stroke Hospital Follow Up (for neurologist use only)          Francisco Miguel is a 68 year old male presenting for follow-up for TIA. He was hospitalized at [facility] on 07/05/23 - 07/06/23. Prior to the hospital stay, he had a past medical history of HLD, pre-DM, Prior TIA in 2007 in [place].  On PTA Lipitor 10 mg daily, not on PTA aspirin. He presented to the hospital with dizziness, decreased coordination R arm and right arm and leg weakness.  Patient reports full resolution of his symptoms and denies any reoccurrence since his hospitalization.  Cardiac event monitor without  atrial fibrillation.  Patient taking aspirin as prescribed.  Risk factors well controlled.  We discussed interventions outlined below and will plan to see the patient back in 6 months.  Francisco understands and agrees with this plan.    Plan:     Secondary prevention  -Continue aspirin 81 mg tablet daily     Lifestyle  - mediterranean diet  - exercise    Risk Factors  Elevated cholesterol levels   LDL: 73  - Goal < 70 mg/dl  - Continue preventive therapy: Atorvastatin as prescribed     Pre-Diabetes   A1c: 5.6%  - Goal: < 7.0%  - Continue preventive therapy: Metformin as prescribed     Obstructive sleep apnea  -Wear CPAP nightly    --- Plan to follow-up with your primary care provider to manage your vascular risk factors  --- Plan on follow up in the Neurology Clinic in 6 months.  --- Please feel free to reach out if you have any further questions or concerns.  --- Seek immediate medical attention if an emergency arises or if your health becomes progressively worse.     For any acute neurologic deficits he was advised to  go directly to the hospital rather than call the clinic.    It was a pleasure to meet you today!     Total Time: Total time spent for face to face visit, reviewing labs/imaging studies, counseling and coordination of care was: 30 Minutes spent on the date of the encounter doing chart review, review of test results, patient visit, documentation, and discussion with family     This note was dictated using voice recognition software.  Any grammatical or context distortions are unintentional and inherent to the software.    Rachel Mckeon, DNP, APRN, CNP  [facility]

## 2023-09-13 ENCOUNTER — VIRTUAL VISIT (OUTPATIENT)
Dept: NEUROLOGY | Facility: CLINIC | Age: 68
End: 2023-09-13
Attending: PHYSICIAN ASSISTANT
Payer: COMMERCIAL

## 2023-09-13 VITALS — BODY MASS INDEX: 28.04 KG/M2 | HEIGHT: 68 IN | WEIGHT: 185 LBS

## 2023-09-13 DIAGNOSIS — G45.9 TIA (TRANSIENT ISCHEMIC ATTACK): ICD-10-CM

## 2023-09-13 DIAGNOSIS — Z86.73 HISTORY OF TIA (TRANSIENT ISCHEMIC ATTACK) AND STROKE: ICD-10-CM

## 2023-09-13 PROCEDURE — 99203 OFFICE O/P NEW LOW 30 MIN: CPT | Mod: VID

## 2023-09-13 NOTE — NURSING NOTE
Chief Complaint   Patient presents with    Stroke     No concerns     Gudelia Joseph on 9/13/2023 at 1:30 PM

## 2023-09-13 NOTE — LETTER
9/13/2023         RE: Francisco Miguel  [address] [address]        Dear Colleague,    Thank you for referring your patient, Francisco Miguel, to the [facility] [place]. Please see a copy of my visit note below.    Francisco is a 68 year old who is being evaluated via a billable video visit.      Video-Visit Details    Type of service:  Video Visit   Video Start Time: 2:00 PM  Video End Time:2:14 PM    Originating Location (pt. Location): Home  Distant Location (provider location):  On-site  Platform used for Video Visit: [email or web address]  __________________________________________________________    ___________________________________  ESTABLISHED PATIENT NEUROLOGY NOTE    DATE OF VISIT: 9/13/2023  MRN: [id number]  PATIENT NAME: Fracnisco Miguel  YOB: 1955    Chief Complaint: Patient presents with:  Stroke: No concerns    SUBJECTIVE:                                                      History of Present Illness: Francisco Miguel is a 68 year old male presenting for follow-up for TIA.     He was hospitalized at [facility] on 07/05/23 - 07/06/23. Prior to the hospital stay, he had a past medical history of HLD, pre-DM, Prior TIA in 2007 in [place].  On PTA Lipitor 10 mg daily, not on PTA aspirin.    He presented to the hospital with dizziness, decreased coordination R arm and right arm and leg weakness.  /80.  He started on aspirin and Plavix and admitted for stroke work-up. .     Stroke Evaluation summarized:    MRI and/or Head CT  MRI: Negative for acute infarct, chronic SVID  CTh: Negative for acute pathology   Intracranial Vasculature  CTA head: Unremarkable   Cervical Vasculature  CTA neck: Presumed atherosclerotic disease aortic arch and origins of great vessels without significant stenosis      Echocardiogram  TTE: LV normal, RV normal, normal bilateral atria, SR   EKG/Telemetry  NSR   Other Testing Not Applicable       LDL 7/5/2023: 73 mg/dL   A1C  [facility]  6/8/2023 5.6%          ABCD2 Patients Score   Age = 60 years 1 point 1   Blood Pressure    SBP = 140 or DBP =  90     1 point 1   Clinical Features    - Unilateral weakness    - Speech disturbance w/o weakness    - Other    2 points  1 point     0 points 2   Duration of symptoms    = 60 minutes    10-59 minutes    < 10 minutes    2 points  1 point  0 points 2   Diabetes  1 point 0   Patient s ABCD2 Score (0-7) = 6     Impression  Transient dizziness/lightheadedness, shortness of breath, gait instability, and questionable slight R arm weakness, lower suspicion but possible transient ischemic attack (TIA), ABCD2 score 6, possibly secondary to small vessel disease given vascular risk factors although they are well controlled, rule out cardioembolic etiology including arrhythmias that could cause dizziness/lightheadedness    History of TIA 2007 in [place]     Recommendations    -Aspirin 81 mg daily indefinitely  -Given questionable symptoms would not add additional Plavix at this time    09/13/23: Francisco presents to the clinic today virtually for his post TIA follow-up.  He is accompanied by his wife, Stacey.  Francisco states his symptoms have fully resolved and denies any reoccurrence of symptoms since his hospitalization.  He was recently hospitalized last week for 5 days for diverticulitis.  At that time he was placed on Lexapro for anxiety which he feels has been beneficial.    Cardiac event monitor without atrial fibrillation.  Patient continues to take aspirin as prescribed.  LDL and A1c are well controlled on medication.  Patient has a diagnosis of sleep apnea and started wearing his CPAP in July.  He reports it is made a huge difference and he sleeping much better.  Past smoking history, he quit 8 years ago.  No history of hypertension.    Patient has no other concerns.     Current Prevention Medications:    Aspirin 81 mg tablet daily  Metformin  Atorvastatin    Perceived Side Effects: No     Psycho-Social History: Francisco  Lam[place] currently lives [place] with Wife and son. Highest level of education Some college Employment status:Retired, assembly for [email or web address]    Patient does not smoke- quit 8 years ago, no alcohol use, no recreational drug use.  Currently, patient denies feeling depressed, denies feeling anhedonia, denies suicidal  thoughts, and denies having feelings of excessive guilt/worthlessness.  We reviewed importance of mental and emotional wellbeing and impact on health.      Current Medications:   aspirin 81 MG EC tablet, Take 1 tablet (81 mg) by mouth daily  atorvastatin (LIPITOR) 20 MG tablet, Take 20 mg by mouth daily  doxazosin (CARDURA) 4 MG tablet, Take 4 mg by mouth At Bedtime  EPINEPHrine (ANY BX GENERIC EQUIV) 0.3 MG/0.3ML injection 2-pack, Inject 0.3 mLs (0.3 mg) into the muscle once as needed for anaphylaxis May repeat one time in 5-15 minutes if response to initial dose is inadequate.  escitalopram (LEXAPRO) 5 MG tablet, Take 5 mg by mouth daily  finasteride (PROSCAR) 5 MG tablet, Take 5 mg by mouth daily  fish oil-omega-3 fatty acids 1000 MG capsule, Take 2 g by mouth daily  metFORMIN (GLUCOPHAGE XR) 500 MG 24 hr tablet, Take 500 mg by mouth every morning    No current facility-administered medications on file prior to visit.    Past Medical History:   Patient  has a past medical history of Diabetes (H), High cholesterol, and TIA (transient ischemic attack).  Surgical History:  He  has a past surgical history that includes orthopedic surgery.  Family and Social History:  Reviewed, and he    Reviewed, and family history is not on file.    RECENT DIAGNOSTIC STUDIES:   Labs:    Coagulation studies:  Recent Labs   Lab Test 07/05/23  1033   INR 0.95        Lipid panel:  Recent Labs   Lab Test 07/05/23  1249   CHOL 132   HDL 44   LDL 73   TRIG 77       HbA1C:  No lab results found.    Troponin:  No lab results found.  No lab results found.  No lab results found.    Imaging:   See HPI  Cardiac event  monitor  -Sinus rhythm with occasional PVC     REVIEW OF SYSTEMS:                                                      10-point review of systems is negative except as mentioned above in HPI.    EXAM:                                                      Physical Exam:   Vitals: No vitals were obtained today due to virtual visit.     MENTAL STATUS: Alert, attentive.   GENERAL: Healthy, alert and no distress  EYES: Eyes grossly normal to inspection.  No discharge   RESP: No audible wheeze, cough, or visible cyanosis.  No visible retractions or increased work of breathing.    MS: No gross musculoskeletal defects noted and normal range of motion of the upper extremities. normal finger-to-nose without dysmetria, rapid alternating movements symmetric  SKIN: Visible skin clear. No significant rash, abnormal pigmentation or lesions to face or neck.  NEURO: Cranial nerves grossly intact.  Mentation and speech appropriate for age. Speech is fluent. Normal comprehension. Normal concentration. Adequate fund of knowledge. EOMI with normal smooth pursuit, facial movements symmetric, hearing not formally tested but intact to conversation, no dysarthria, shoulder shrug equal bilaterally, tongue protrusion midline  PSYCH: Mentation appears normal, affect normal/bright, judgement and insight intact, normal speech and appearance well-groomed.     ASSESSMENT and PLAN:                                                      Assessment:    ICD-10-CM    1. TIA (transient ischemic attack)  G45.9 Stroke Hospital Follow Up (for neurologist use only)      2. History of TIA (transient ischemic attack) and stroke  Z86.73 Stroke Hospital Follow Up (for neurologist use only)          Francisco Miguel is a 68 year old male presenting for follow-up for TIA. He was hospitalized at [facility] on 07/05/23 - 07/06/23. Prior to the hospital stay, he had a past medical history of HLD, pre-DM, Prior TIA in 2007 in [place].  On PTA Lipitor 10 mg  daily, not on PTA aspirin. He presented to the hospital with dizziness, decreased coordination R arm and right arm and leg weakness.  Patient reports full resolution of his symptoms and denies any reoccurrence since his hospitalization.  Cardiac event monitor without atrial fibrillation.  Patient taking aspirin as prescribed.  Risk factors well controlled.  We discussed interventions outlined below and will plan to see the patient back in 6 months.  Francisco understands and agrees with this plan.    Plan:     Secondary prevention  -Continue aspirin 81 mg tablet daily     Lifestyle  - mediterranean diet  - exercise    Risk Factors  Elevated cholesterol levels   LDL: 73  - Goal < 70 mg/dl  - Continue preventive therapy: Atorvastatin as prescribed     Pre-Diabetes   A1c: 5.6%  - Goal: < 7.0%  - Continue preventive therapy: Metformin as prescribed     Obstructive sleep apnea  -Wear CPAP nightly    --- Plan to follow-up with your primary care provider to manage your vascular risk factors  --- Plan on follow up in the Neurology Clinic in 6 months.  --- Please feel free to reach out if you have any further questions or concerns.  --- Seek immediate medical attention if an emergency arises or if your health becomes progressively worse.     For any acute neurologic deficits he was advised to  go directly to the hospital rather than call the clinic.    It was a pleasure to meet you today!     Total Time: Total time spent for face to face visit, reviewing labs/imaging studies, counseling and coordination of care was: 30 Minutes spent on the date of the encounter doing chart review, review of test results, patient visit, documentation, and discussion with family     This note was dictated using voice recognition software.  Any grammatical or context distortions are unintentional and inherent to the software.    Rachel Mckeon, DNP, APRN, CNP  [facility]         Again, thank you for allowing me to participate in the care  of your patient.        Sincerely,        STEPHANIE Lantigua CNP

## 2023-09-13 NOTE — PATIENT INSTRUCTIONS
Plan:     Secondary prevention  -Continue aspirin 81 mg tablet daily     Lifestyle  - mediterranean diet  - exercise    Risk Factors  Elevated cholesterol levels   LDL: 73  - Goal < 70 mg/dl  - Continue preventive therapy: Atorvastatin as prescribed     Pre-Diabetes   A1c: 5.6%  - Goal: < 7.0%  - Continue preventive therapy: Metformin as prescribed     Obstructive sleep apnea  -Wear CPAP nightly    --- Plan to follow-up with your primary care provider to manage your vascular risk factors  --- Plan on follow up in the Neurology Clinic in 6 months.  --- Please feel free to reach out if you have any further questions or concerns.  --- Seek immediate medical attention if an emergency arises or if your health becomes progressively worse.     For any acute neurologic deficits he was advised to  go directly to the hospital rather than call the clinic.    It was a pleasure to meet you today!

## 2023-10-31 ENCOUNTER — HOSPITAL ENCOUNTER (OUTPATIENT)
Facility: CLINIC | Age: 68
Discharge: HOME | End: 2023-10-31
Attending: COLON & RECTAL SURGERY | Admitting: COLON & RECTAL SURGERY
Payer: COMMERCIAL

## 2023-10-31 VITALS
WEIGHT: 185 LBS | HEART RATE: 67 BPM | SYSTOLIC BLOOD PRESSURE: 107 MMHG | HEIGHT: 68 IN | RESPIRATION RATE: 16 BRPM | BODY MASS INDEX: 28.04 KG/M2 | OXYGEN SATURATION: 94 % | DIASTOLIC BLOOD PRESSURE: 70 MMHG

## 2023-10-31 LAB — COLONOSCOPY: NORMAL

## 2023-10-31 PROCEDURE — 88305 TISSUE EXAM BY PATHOLOGIST: CPT | Mod: TC | Performed by: COLON & RECTAL SURGERY

## 2023-10-31 PROCEDURE — G0500 MOD SEDAT ENDO SERVICE >5YRS: HCPCS | Performed by: COLON & RECTAL SURGERY

## 2023-10-31 PROCEDURE — 250N000011 HC RX IP 250 OP 636: Mod: JZ | Performed by: COLON & RECTAL SURGERY

## 2023-10-31 PROCEDURE — 45380 COLONOSCOPY AND BIOPSY: CPT | Performed by: COLON & RECTAL SURGERY

## 2023-10-31 PROCEDURE — 250N000013 HC RX MED GY IP 250 OP 250 PS 637: Performed by: COLON & RECTAL SURGERY

## 2023-10-31 RX ORDER — FLUMAZENIL 0.1 MG/ML
0.2 INJECTION, SOLUTION INTRAVENOUS
Status: DISCONTINUED | OUTPATIENT
Start: 2023-10-31 | End: 2023-10-31 | Stop reason: HOSPADM

## 2023-10-31 RX ORDER — ONDANSETRON 4 MG/1
4 TABLET, ORALLY DISINTEGRATING ORAL EVERY 6 HOURS PRN
Status: DISCONTINUED | OUTPATIENT
Start: 2023-10-31 | End: 2023-10-31 | Stop reason: HOSPADM

## 2023-10-31 RX ORDER — ONDANSETRON 2 MG/ML
4 INJECTION INTRAMUSCULAR; INTRAVENOUS
Status: DISCONTINUED | OUTPATIENT
Start: 2023-10-31 | End: 2023-10-31 | Stop reason: HOSPADM

## 2023-10-31 RX ORDER — ONDANSETRON 2 MG/ML
4 INJECTION INTRAMUSCULAR; INTRAVENOUS EVERY 6 HOURS PRN
Status: DISCONTINUED | OUTPATIENT
Start: 2023-10-31 | End: 2023-10-31 | Stop reason: HOSPADM

## 2023-10-31 RX ORDER — EPINEPHRINE 1 MG/ML
0.1 INJECTION, SOLUTION INTRAMUSCULAR; SUBCUTANEOUS
Status: DISCONTINUED | OUTPATIENT
Start: 2023-10-31 | End: 2023-10-31 | Stop reason: HOSPADM

## 2023-10-31 RX ORDER — LIDOCAINE 40 MG/G
CREAM TOPICAL
Status: DISCONTINUED | OUTPATIENT
Start: 2023-10-31 | End: 2023-10-31 | Stop reason: HOSPADM

## 2023-10-31 RX ORDER — NALOXONE HYDROCHLORIDE 0.4 MG/ML
0.4 INJECTION, SOLUTION INTRAMUSCULAR; INTRAVENOUS; SUBCUTANEOUS
Status: DISCONTINUED | OUTPATIENT
Start: 2023-10-31 | End: 2023-10-31 | Stop reason: HOSPADM

## 2023-10-31 RX ORDER — PROCHLORPERAZINE MALEATE 5 MG
5 TABLET ORAL EVERY 6 HOURS PRN
Status: DISCONTINUED | OUTPATIENT
Start: 2023-10-31 | End: 2023-10-31 | Stop reason: HOSPADM

## 2023-10-31 RX ORDER — ATROPINE SULFATE 0.1 MG/ML
1 INJECTION INTRAVENOUS
Status: DISCONTINUED | OUTPATIENT
Start: 2023-10-31 | End: 2023-10-31 | Stop reason: HOSPADM

## 2023-10-31 RX ORDER — SIMETHICONE 40MG/0.6ML
133 SUSPENSION, DROPS(FINAL DOSAGE FORM)(ML) ORAL
Status: DISCONTINUED | OUTPATIENT
Start: 2023-10-31 | End: 2023-10-31 | Stop reason: HOSPADM

## 2023-10-31 RX ORDER — DIPHENHYDRAMINE HYDROCHLORIDE 50 MG/ML
25-50 INJECTION INTRAMUSCULAR; INTRAVENOUS
Status: DISCONTINUED | OUTPATIENT
Start: 2023-10-31 | End: 2023-10-31 | Stop reason: HOSPADM

## 2023-10-31 RX ORDER — FENTANYL CITRATE 50 UG/ML
50-100 INJECTION, SOLUTION INTRAMUSCULAR; INTRAVENOUS EVERY 5 MIN PRN
Status: DISCONTINUED | OUTPATIENT
Start: 2023-10-31 | End: 2023-10-31 | Stop reason: HOSPADM

## 2023-10-31 RX ORDER — NALOXONE HYDROCHLORIDE 0.4 MG/ML
0.2 INJECTION, SOLUTION INTRAMUSCULAR; INTRAVENOUS; SUBCUTANEOUS
Status: DISCONTINUED | OUTPATIENT
Start: 2023-10-31 | End: 2023-10-31 | Stop reason: HOSPADM

## 2023-10-31 RX ORDER — SIMETHICONE 40MG/0.6ML
SUSPENSION, DROPS(FINAL DOSAGE FORM)(ML) ORAL PRN
Status: DISCONTINUED | OUTPATIENT
Start: 2023-10-31 | End: 2023-10-31 | Stop reason: HOSPADM

## 2023-10-31 RX ADMIN — FENTANYL CITRATE 100 MCG: 50 INJECTION INTRAMUSCULAR; INTRAVENOUS at 07:28

## 2023-10-31 RX ADMIN — MIDAZOLAM HYDROCHLORIDE 1 MG: 1 INJECTION, SOLUTION INTRAMUSCULAR; INTRAVENOUS at 07:31

## 2023-10-31 RX ADMIN — MIDAZOLAM HYDROCHLORIDE 2 MG: 1 INJECTION, SOLUTION INTRAMUSCULAR; INTRAVENOUS at 07:28

## 2023-10-31 RX ADMIN — MIDAZOLAM HYDROCHLORIDE 1 MG: 1 INJECTION, SOLUTION INTRAMUSCULAR; INTRAVENOUS at 07:35

## 2023-10-31 ASSESSMENT — ACTIVITIES OF DAILY LIVING (ADL): ADLS_ACUITY_SCORE: 35

## 2023-10-31 NOTE — H&P
Pre-Endoscopy History and Physical     Francisco Miguel MRN# [id number]   YOB: 1955 Age: 68 year old     Date of Procedure: 10/31/2023  Primary care provider: Sulaiman Perales  Type of Endoscopy: Colonoscopy  Reason for Procedure: F/U diverticulitis  Type of Anesthesia Anticipated: Moderate Sedation    HPI:    Francisco is a 68 year old male who will be undergoing the above procedure.      A history and physical has been performed. The patient's medications and allergies have been reviewed. The risks and benefits of the procedure and the sedation options and risks were discussed with the patient.  All questions were answered and informed consent was obtained.      He denies a personal or family history of anesthesia complications or bleeding disorders.     Allergies   Allergen Reactions    Ciprofloxacin Angioedema     Lip swelling, throat fullness, nausea.      Metronidazole Angioedema     Lip swelling, throat fullness, nausea        No current facility-administered medications on file prior to encounter.  aspirin 81 MG EC tablet, Take 1 tablet (81 mg) by mouth daily  atorvastatin (LIPITOR) 20 MG tablet, Take 20 mg by mouth daily  doxazosin (CARDURA) 4 MG tablet, Take 4 mg by mouth At Bedtime  EPINEPHrine (ANY BX GENERIC EQUIV) 0.3 MG/0.3ML injection 2-pack, Inject 0.3 mLs (0.3 mg) into the muscle once as needed for anaphylaxis May repeat one time in 5-15 minutes if response to initial dose is inadequate.  escitalopram (LEXAPRO) 5 MG tablet, Take 5 mg by mouth daily  finasteride (PROSCAR) 5 MG tablet, Take 5 mg by mouth daily  fish oil-omega-3 fatty acids 1000 MG capsule, Take 2 g by mouth daily  metFORMIN (GLUCOPHAGE XR) 500 MG 24 hr tablet, Take 500 mg by mouth every morning        Patient Active Problem List   Diagnosis    Shortness of breath    Dizziness    Right sided weakness    TIA (transient ischemic attack)    History of TIA (transient ischemic attack) and stroke    Diverticulitis of colon with  "perforation        Past Medical History:   Diagnosis Date    Diabetes (H)     High cholesterol     TIA (transient ischemic attack)         Past Surgical History:   Procedure Laterality Date    ORTHOPEDIC SURGERY         Social History     Tobacco Use    Smoking status: Former     Types: Cigarettes    Smokeless tobacco: Never   Substance Use Topics    Alcohol use: Not Currently     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 No pertinent family history.    REVIEW OF SYSTEMS:     5 point ROS negative except as noted above in HPI, including Gen., Resp., CV, GI &  system review.      PHYSICAL EXAM:   There were no vitals taken for this visit. Estimated body mass index is 28.13 kg/m  as calculated from the following:    Height as of 9/13/23: 1.727 m (5' 8\").    Weight as of 9/13/23: 83.9 kg (185 lb).   GENERAL APPEARANCE: healthy and alert  MENTAL STATUS: alert  AIRWAY EXAM: Mallampatti Class I (visualization of the soft palate, fauces, uvula, anterior and posterior pillars)  RESP: lungs clear to auscultation - no rales, rhonchi or wheezes  CV: regular rates and rhythm      IMPRESSION   ASA Class 2 - Mild systemic disease        PLAN:     Plan for colonoscopy. We discussed the risks, benefits and alternatives and the patient wished to proceed.    The above has been forwarded to the consulting provider.      Janine Lane MD  [facility]  Phone: [phone]  Fax: [phone]  October 31, 2023    "

## 2023-11-01 LAB
PATH REPORT.COMMENTS IMP SPEC: NORMAL
PATH REPORT.COMMENTS IMP SPEC: NORMAL
PATH REPORT.FINAL DX SPEC: NORMAL
PATH REPORT.GROSS SPEC: NORMAL
PATH REPORT.MICROSCOPIC SPEC OTHER STN: NORMAL
PATH REPORT.RELEVANT HX SPEC: NORMAL
PHOTO IMAGE: NORMAL

## 2023-11-01 PROCEDURE — 88305 TISSUE EXAM BY PATHOLOGIST: CPT | Mod: 26 | Performed by: PATHOLOGY

## 2023-12-15 VITALS
SYSTOLIC BLOOD PRESSURE: 143 MMHG | HEIGHT: 68 IN | WEIGHT: 190 LBS | BODY MASS INDEX: 28.79 KG/M2 | RESPIRATION RATE: 20 BRPM | DIASTOLIC BLOOD PRESSURE: 69 MMHG | OXYGEN SATURATION: 100 % | HEART RATE: 68 BPM | TEMPERATURE: 98 F

## 2023-12-15 LAB
BASOPHILS # BLD AUTO: 0 10E3/UL (ref 0–0.2)
BASOPHILS NFR BLD AUTO: 1 %
EOSINOPHIL # BLD AUTO: 0.6 10E3/UL (ref 0–0.7)
EOSINOPHIL NFR BLD AUTO: 8 %
ERYTHROCYTE [DISTWIDTH] IN BLOOD BY AUTOMATED COUNT: 13.3 % (ref 10–15)
HCT VFR BLD AUTO: 39.8 % (ref 40–53)
HGB BLD-MCNC: 13.1 G/DL (ref 13.3–17.7)
IMM GRANULOCYTES # BLD: 0 10E3/UL
IMM GRANULOCYTES NFR BLD: 0 %
LYMPHOCYTES # BLD AUTO: 2.9 10E3/UL (ref 0.8–5.3)
LYMPHOCYTES NFR BLD AUTO: 35 %
MCH RBC QN AUTO: 29.9 PG (ref 26.5–33)
MCHC RBC AUTO-ENTMCNC: 32.9 G/DL (ref 31.5–36.5)
MCV RBC AUTO: 91 FL (ref 78–100)
MONOCYTES # BLD AUTO: 0.8 10E3/UL (ref 0–1.3)
MONOCYTES NFR BLD AUTO: 9 %
NEUTROPHILS # BLD AUTO: 3.9 10E3/UL (ref 1.6–8.3)
NEUTROPHILS NFR BLD AUTO: 47 %
NRBC # BLD AUTO: 0 10E3/UL
NRBC BLD AUTO-RTO: 0 /100
PLATELET # BLD AUTO: 311 10E3/UL (ref 150–450)
RBC # BLD AUTO: 4.38 10E6/UL (ref 4.4–5.9)
WBC # BLD AUTO: 8.2 10E3/UL (ref 4–11)

## 2023-12-15 PROCEDURE — 93005 ELECTROCARDIOGRAM TRACING: CPT

## 2023-12-15 PROCEDURE — 85014 HEMATOCRIT: CPT | Performed by: EMERGENCY MEDICINE

## 2023-12-15 PROCEDURE — 80053 COMPREHEN METABOLIC PANEL: CPT | Performed by: EMERGENCY MEDICINE

## 2023-12-15 PROCEDURE — 99281 EMR DPT VST MAYX REQ PHY/QHP: CPT

## 2023-12-15 PROCEDURE — 36415 COLL VENOUS BLD VENIPUNCTURE: CPT | Performed by: EMERGENCY MEDICINE

## 2023-12-15 PROCEDURE — 84484 ASSAY OF TROPONIN QUANT: CPT | Performed by: EMERGENCY MEDICINE

## 2023-12-16 ENCOUNTER — HOSPITAL ENCOUNTER (EMERGENCY)
Facility: CLINIC | Age: 68
Discharge: LEFT BEFORE DISCHARGE | End: 2023-12-16
Admitting: EMERGENCY MEDICINE
Payer: COMMERCIAL

## 2023-12-16 LAB
ALBUMIN SERPL BCG-MCNC: 4.1 G/DL (ref 3.5–5.2)
ALP SERPL-CCNC: 181 U/L (ref 40–150)
ALT SERPL W P-5'-P-CCNC: 51 U/L (ref 0–70)
ANION GAP SERPL CALCULATED.3IONS-SCNC: 9 MMOL/L (ref 7–15)
AST SERPL W P-5'-P-CCNC: 34 U/L (ref 0–45)
BILIRUB SERPL-MCNC: 0.2 MG/DL
BUN SERPL-MCNC: 15.4 MG/DL (ref 8–23)
CALCIUM SERPL-MCNC: 9.1 MG/DL (ref 8.8–10.2)
CHLORIDE SERPL-SCNC: 101 MMOL/L (ref 98–107)
CREAT SERPL-MCNC: 0.89 MG/DL (ref 0.67–1.17)
DEPRECATED HCO3 PLAS-SCNC: 30 MMOL/L (ref 22–29)
EGFRCR SERPLBLD CKD-EPI 2021: >90 ML/MIN/1.73M2
GLUCOSE SERPL-MCNC: 107 MG/DL (ref 70–99)
HOLD SPECIMEN: NORMAL
HOLD SPECIMEN: NORMAL
POTASSIUM SERPL-SCNC: 4.3 MMOL/L (ref 3.4–5.3)
PROT SERPL-MCNC: 7.2 G/DL (ref 6.4–8.3)
SODIUM SERPL-SCNC: 140 MMOL/L (ref 135–145)
TROPONIN T SERPL HS-MCNC: 10 NG/L

## 2023-12-16 ASSESSMENT — ACTIVITIES OF DAILY LIVING (ADL): ADLS_ACUITY_SCORE: 33

## 2023-12-16 NOTE — ED TRIAGE NOTES
Hx of diverticulitis, finished his antibiotic today. This afternoon but started with upper abd pain that wraps around to his back. Pt has mild shortness of breath.

## 2023-12-18 LAB
ATRIAL RATE - MUSE: 64 BPM
DIASTOLIC BLOOD PRESSURE - MUSE: NORMAL MMHG
INTERPRETATION ECG - MUSE: NORMAL
P AXIS - MUSE: 67 DEGREES
PR INTERVAL - MUSE: 156 MS
QRS DURATION - MUSE: 90 MS
QT - MUSE: 414 MS
QTC - MUSE: 427 MS
R AXIS - MUSE: 63 DEGREES
SYSTOLIC BLOOD PRESSURE - MUSE: NORMAL MMHG
T AXIS - MUSE: 56 DEGREES
VENTRICULAR RATE- MUSE: 64 BPM

## 2024-03-11 NOTE — PROGRESS NOTES
Francisco is a 68 year old who is being evaluated via a billable video visit.      Video-Visit Details    Type of service:  Video Visit   Video Start Time: 09:59 AM  Video End Time:10:17 AM    Originating Location (pt. Location): Home  Distant Location (provider location):  On-site  Platform used for Video Visit: Albina  __________________________________________________________    ___________________________________  ESTABLISHED PATIENT NEUROLOGY NOTE    DATE OF VISIT: 9/13/2023  MRN: [id number]  PATIENT NAME: Francisco Miguel  YOB: 1955    Chief Complaint: Patient presents with:  Follow Up    SUBJECTIVE:                                                      History of Present Illness: Francisco Miguel is a 68 year old male presenting for follow-up for TIA.     He was hospitalized at [facility] on 07/05/23 - 07/06/23. Prior to the hospital stay, he had a past medical history of HLD, pre-DM, Prior TIA in 2007 in [place].  On PTA Lipitor 10 mg daily, not on PTA aspirin.    He presented to the hospital with dizziness, decreased coordination R arm and right arm and leg weakness.  /80.  He started on aspirin and Plavix and admitted for stroke work-up. .     Stroke Evaluation summarized:    MRI and/or Head CT  MRI: Negative for acute infarct, chronic SVID  CTh: Negative for acute pathology   Intracranial Vasculature  CTA head: Unremarkable   Cervical Vasculature  CTA neck: Presumed atherosclerotic disease aortic arch and origins of great vessels without significant stenosis      Echocardiogram  TTE: LV normal, RV normal, normal bilateral atria, SR   EKG/Telemetry  NSR   Other Testing Not Applicable       LDL 7/5/2023: 73 mg/dL   A1C  [facility] 6/8/2023 5.6%          ABCD2 Patients Score   Age ? 60 years 1 point 1   Blood Pressure    SBP ? 140 or DBP ?  90     1 point 1   Clinical Features    - Unilateral weakness    - Speech disturbance w/o weakness    - Other    2 points  1 point     0 points 2    Duration of symptoms    ? 60 minutes    10-59 minutes    < 10 minutes    2 points  1 point  0 points 2   Diabetes  1 point 0   Patient s ABCD2 Score (0-7) = 6     Impression  Transient dizziness/lightheadedness, shortness of breath, gait instability, and questionable slight R arm weakness, lower suspicion but possible transient ischemic attack (TIA), ABCD2 score 6, possibly secondary to small vessel disease given vascular risk factors although they are well controlled, rule out cardioembolic etiology including arrhythmias that could cause dizziness/lightheadedness    History of TIA 2007 in [place]     Recommendations    -Aspirin 81 mg daily indefinitely  -Given questionable symptoms would not add additional Plavix at this time    09/13/23: Francisco presents to the clinic today virtually for his post TIA follow-up.  He is accompanied by his wife, Stacey.  Francisco states his symptoms have fully resolved and denies any reoccurrence of symptoms since his hospitalization.  He was recently hospitalized last week for 5 days for diverticulitis.  At that time he was placed on Lexapro for anxiety which he feels has been beneficial.    Cardiac event monitor without atrial fibrillation.  Patient continues to take aspirin as prescribed.  LDL and A1c are well controlled on medication.  Patient has a diagnosis of sleep apnea and started wearing his CPAP in July.  He reports it is made a huge difference and he sleeping much better.  Past smoking history, he quit 8 years ago.  No history of hypertension.    Patient has no other concerns.    03/14/24: Francisco presents to the clinic virtually for 6-month follow-up.  He is accompanied by his wife.  Francisco reports that his symptoms have not reoccurred since his hospitalization.  He denies any new strokelike symptoms.  No dizziness.  No changes in vision, speech or swallowing.  Denies unilateral numbness or weakness.    Francisco continues to take aspirin as prescribed.  LDL and A1c slightly higher than  last visit.,  Following with primary care on these risk factors.  He states that he is having some difficulty with the CPAP machine.  He is working with sleep medicine on this.    Patient states that he has bilateral neuropathy in his lower extremities.  He is interested in further workup.  His next appointment can be to establish with a neurologist for new concern of neuropathy.  Educated the patient that this will be need to be an in person visit.  He states that he may look for a neurologist closer to [place].  His wife states that she is also noticed he is more forgetful.  He is forgetting conversations and places he has been.  Denies any safety concerns with his memory.  He is not getting lost or leaving the stove on.  He feels like his mind is wandering versus being as forgetful.  Patient states that he followed with his primary care provider who has initiated workup for memory.     Current Prevention Medications:    Aspirin 81 mg tablet daily  Metformin  Atorvastatin    Perceived Side Effects: No     Psycho-Social History: Francisco Miguel currently lives [place] with Wife and son. Highest level of education Some college Employment status:Retired, assembly for [employer]    Patient does not smoke- quit 8 years ago, no alcohol use, no recreational drug use.  Currently, patient denies feeling depressed, denies feeling anhedonia, denies suicidal  thoughts, and denies having feelings of excessive guilt/worthlessness.  We reviewed importance of mental and emotional wellbeing and impact on health.      Current Medications:   aspirin 81 MG EC tablet, Take 1 tablet (81 mg) by mouth daily  atorvastatin (LIPITOR) 20 MG tablet, Take 20 mg by mouth daily  doxazosin (CARDURA) 4 MG tablet, Take 4 mg by mouth At Bedtime  EPINEPHrine (ANY BX GENERIC EQUIV) 0.3 MG/0.3ML injection 2-pack, Inject 0.3 mLs (0.3 mg) into the muscle once as needed for anaphylaxis May repeat one time in 5-15 minutes if response to initial dose is  inadequate.  escitalopram (LEXAPRO) 5 MG tablet, Take 5 mg by mouth daily  finasteride (PROSCAR) 5 MG tablet, Take 5 mg by mouth daily  fish oil-omega-3 fatty acids 1000 MG capsule, Take 2 g by mouth daily  metFORMIN (GLUCOPHAGE XR) 500 MG 24 hr tablet, Take 500 mg by mouth every morning    No current facility-administered medications on file prior to visit.    Past Medical History:   Patient  has a past medical history of Diabetes (H), High cholesterol, and TIA (transient ischemic attack).  Surgical History:  He  has a past surgical history that includes orthopedic surgery and Colonoscopy (N/A, 10/31/2023).  Family and Social History:  Reviewed, and he  reports that he has quit smoking. His smoking use included cigarettes. He has never used smokeless tobacco. He reports that he does not currently use alcohol. He reports that he does not use drugs.  Reviewed, and family history is not on file.    RECENT DIAGNOSTIC STUDIES:   Labs:    Coagulation studies:  Recent Labs   Lab Test 07/05/23  1033   INR 0.95        Lipid panel:  Recent Labs   Lab Test 07/05/23  1249   CHOL 132   HDL 44   LDL 73   TRIG 77       HbA1C:  No lab results found.    Troponin:  No lab results found.  No lab results found.  No lab results found.    Imaging:   See [facility]  Cardiac event monitor  -Sinus rhythm with occasional PVC     REVIEW OF SYSTEMS:                                                      10-point review of systems is negative except as mentioned above in HPI.    EXAM:                                                      Physical Exam:   Vitals: No vitals were obtained today due to virtual visit.     MENTAL STATUS: Alert, attentive.   GENERAL: Healthy, alert and no distress  EYES: Eyes grossly normal to inspection.  No discharge   RESP: No audible wheeze, cough, or visible cyanosis.  No visible retractions or increased work of breathing.    MS: No gross musculoskeletal defects noted and normal range of motion of the upper  extremities.  SKIN: Visible skin clear. No significant rash, abnormal pigmentation or lesions to face or neck.  NEURO: Cranial nerves grossly intact.  Mentation and speech appropriate for age. Speech is fluent. Normal comprehension. Normal concentration. Adequate fund of knowledge. EOMI with normal smooth pursuit, facial movements symmetric, hearing not formally tested but intact to conversation, no dysarthria.  PSYCH: Mentation appears normal, affect normal/bright, judgement and insight intact, normal speech and appearance well-groomed.     ASSESSMENT and PLAN:                                                      Assessment:    ICD-10-CM    1. TIA (transient ischemic attack)  G45.9           Francisco Miguel is a 68 year old male presenting for follow-up for TIA. He was hospitalized at [facility] on 07/05/23 - 07/06/23. Prior to the hospital stay, he had a past medical history of HLD, pre-DM, Prior TIA in 2007 in [place].  On PTA Lipitor 10 mg daily, not on PTA aspirin. He presented to the hospital with dizziness, decreased coordination R arm and right arm and leg weakness.  Patient reports full resolution of his symptoms and denies any reoccurrence since his hospitalization.  Patient would like further workup for neuropathy.  He will establish with a neurologist in the near future.  Francisco understands and agrees with this plan.    Plan:     Secondary prevention  -Continue aspirin 81 mg tablet daily     Lifestyle  - mediterranean diet  - exercise    Risk Factors  Elevated cholesterol levels   LDL: 88  - Goal < 70 mg/dl  - Continue preventive therapy: Atorvastatin as prescribed     Pre-Diabetes   A1c: 5.7%  - Goal: < 7.0%  - Continue preventive therapy: Metformin as prescribed     Obstructive sleep apnea  -Wear CPAP nightly    --- Plan to follow-up with your primary care provider to manage your vascular risk factors  --- Plan on follow up in the Neurology Clinic in 6 months with neurologist for neuropathy/new  visit/in person.  --- Please feel free to reach out if you have any further questions or concerns.  --- Seek immediate medical attention if an emergency arises or if your health becomes progressively worse.     For any acute neurologic deficits he was advised to  go directly to the hospital rather than call the clinic.    It was a pleasure to meet you today!     Total Time: Total time spent for face to face visit, reviewing labs/imaging studies, counseling and coordination of care was: 30 Minutes spent on the date of the encounter doing chart review, review of test results, patient visit, documentation, and discussion with family     This note was dictated using voice recognition software.  Any grammatical or context distortions are unintentional and inherent to the software.    Rachel Mckeon, DNP, APRN, CNP  [facility]

## 2024-03-14 ENCOUNTER — TELEPHONE (OUTPATIENT)
Dept: NEUROLOGY | Facility: CLINIC | Age: 69
End: 2024-03-14

## 2024-03-14 ENCOUNTER — VIRTUAL VISIT (OUTPATIENT)
Dept: NEUROLOGY | Facility: CLINIC | Age: 69
End: 2024-03-14
Payer: COMMERCIAL

## 2024-03-14 VITALS — HEIGHT: 68 IN | BODY MASS INDEX: 28.79 KG/M2 | WEIGHT: 190 LBS

## 2024-03-14 DIAGNOSIS — G45.9 TIA (TRANSIENT ISCHEMIC ATTACK): Primary | ICD-10-CM

## 2024-03-14 PROCEDURE — 99214 OFFICE O/P EST MOD 30 MIN: CPT | Mod: 95

## 2024-03-14 ASSESSMENT — PAIN SCALES - GENERAL: PAINLEVEL: NO PAIN (0)

## 2024-03-14 NOTE — NURSING NOTE
Is the patient currently in the state of [place]? YES    Visit mode:VIDEO    If the visit is dropped, the patient can be reconnected by: VIDEO VISIT: Text to cell phone:   Telephone Information:   Mobile [phone]       Will anyone else be joining the visit? NO  (If patient encounters technical issues they should call [phone])    How would you like to obtain your AVS? MyChart    Are changes needed to the allergy or medication list? No    Reason for visit: Follow Up    Uma RICO

## 2024-03-14 NOTE — LETTER
3/14/2024         RE: Francisco Miguel  [address] [address]        Dear Colleague,    Thank you for referring your patient, Francisco Miguel, to the [facility] [place]. Please see a copy of my visit note below.    Francisco is a 68 year old who is being evaluated via a billable video visit.      Video-Visit Details    Type of service:  Video Visit   Video Start Time: 09:59 AM  Video End Time:10:17 AM    Originating Location (pt. Location): Home  Distant Location (provider location):  On-site  Platform used for Video Visit: [place]  __________________________________________________________    ___________________________________  ESTABLISHED PATIENT NEUROLOGY NOTE    DATE OF VISIT: 9/13/2023  MRN: [id number]  PATIENT NAME: Francisco Miguel  YOB: 1955    Chief Complaint: Patient presents with:  Follow Up    SUBJECTIVE:                                                      History of Present Illness: Francisco Miguel is a 68 year old male presenting for follow-up for TIA.     He was hospitalized at [facility] on 07/05/23 - 07/06/23. Prior to the hospital stay, he had a past medical history of HLD, pre-DM, Prior TIA in 2007 in [place].  On PTA Lipitor 10 mg daily, not on PTA aspirin.    He presented to the hospital with dizziness, decreased coordination R arm and right arm and leg weakness.  /80.  He started on aspirin and Plavix and admitted for stroke work-up. .     Stroke Evaluation summarized:    MRI and/or Head CT  MRI: Negative for acute infarct, chronic SVID  CTh: Negative for acute pathology   Intracranial Vasculature  CTA head: Unremarkable   Cervical Vasculature  CTA neck: Presumed atherosclerotic disease aortic arch and origins of great vessels without significant stenosis      Echocardiogram  TTE: LV normal, RV normal, normal bilateral atria, SR   EKG/Telemetry  NSR   Other Testing Not Applicable       LDL 7/5/2023: 73 mg/dL   A1C  [facility]  6/8/2023 5.6%          ABCD2 Patients Score   Age = 60 years 1 point 1   Blood Pressure    SBP = 140 or DBP =  90     1 point 1   Clinical Features    - Unilateral weakness    - Speech disturbance w/o weakness    - Other    2 points  1 point     0 points 2   Duration of symptoms    = 60 minutes    10-59 minutes    < 10 minutes    2 points  1 point  0 points 2   Diabetes  1 point 0   Patient s ABCD2 Score (0-7) = 6     Impression  Transient dizziness/lightheadedness, shortness of breath, gait instability, and questionable slight R arm weakness, lower suspicion but possible transient ischemic attack (TIA), ABCD2 score 6, possibly secondary to small vessel disease given vascular risk factors although they are well controlled, rule out cardioembolic etiology including arrhythmias that could cause dizziness/lightheadedness    History of TIA 2007 in [place]     Recommendations    -Aspirin 81 mg daily indefinitely  -Given questionable symptoms would not add additional Plavix at this time    09/13/23: Francisco presents to the clinic today virtually for his post TIA follow-up.  He is accompanied by his wife, Stacey.  Francisco states his symptoms have fully resolved and denies any reoccurrence of symptoms since his hospitalization.  He was recently hospitalized last week for 5 days for diverticulitis.  At that time he was placed on Lexapro for anxiety which he feels has been beneficial.    Cardiac event monitor without atrial fibrillation.  Patient continues to take aspirin as prescribed.  LDL and A1c are well controlled on medication.  Patient has a diagnosis of sleep apnea and started wearing his CPAP in July.  He reports it is made a huge difference and he sleeping much better.  Past smoking history, he quit 8 years ago.  No history of hypertension.    Patient has no other concerns.    03/14/24: Francisco presents to the clinic virtually for 6-month follow-up.  He is accompanied by his wife.  Francisco reports that his symptoms have not  reoccurred since his hospitalization.  He denies any new strokelike symptoms.  No dizziness.  No changes in vision, speech or swallowing.  Denies unilateral numbness or weakness.    Francisco continues to take aspirin as prescribed.  LDL and A1c slightly higher than last visit.,  Following with primary care on these risk factors.  He states that he is having some difficulty with the CPAP machine.  He is working with sleep medicine on this.    Patient states that he has bilateral neuropathy in his lower extremities.  He is interested in further workup.  His next appointment can be to establish with a neurologist for new concern of neuropathy.  Educated the patient that this will be need to be an in person visit.  He states that he may look for a neurologist closer to [place].  His wife states that she is also noticed he is more forgetful.  He is forgetting conversations and places he has been.  Denies any safety concerns with his memory.  He is not getting lost or leaving the stove on.  He feels like his mind is wandering versus being as forgetful.  Patient states that he followed with his primary care provider who has initiated workup for memory.     Current Prevention Medications:    Aspirin 81 mg tablet daily  Metformin  Atorvastatin    Perceived Side Effects: No     Psycho-Social History: Francisco Miguel currently lives [place] with Wife and son. 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  Currently, patient denies feeling depressed, denies feeling anhedonia, denies suicidal  thoughts, and denies having feelings of excessive guilt/worthlessness.  We reviewed importance of mental and emotional wellbeing and impact on health.      Current Medications:   aspirin 81 MG EC tablet, Take 1 tablet (81 mg) by mouth daily  atorvastatin (LIPITOR) 20 MG tablet, Take 20 mg by mouth  daily  doxazosin (CARDURA) 4 MG tablet, Take 4 mg by mouth At Bedtime  EPINEPHrine (ANY BX GENERIC EQUIV) 0.3 MG/0.3ML injection 2-pack, Inject 0.3 mLs (0.3 mg) into the muscle once as needed for anaphylaxis May repeat one time in 5-15 minutes if response to initial dose is inadequate.  escitalopram (LEXAPRO) 5 MG tablet, Take 5 mg by mouth daily  finasteride (PROSCAR) 5 MG tablet, Take 5 mg by mouth daily  fish oil-omega-3 fatty acids 1000 MG capsule, Take 2 g by mouth daily  metFORMIN (GLUCOPHAGE XR) 500 MG 24 hr tablet, Take 500 mg by mouth every morning    No current facility-administered medications on file prior to visit.    Past Medical History:   Patient  has a past medical history of Diabetes (H), High cholesterol, and TIA (transient ischemic attack).  Surgical History:  He  has a past surgical history that includes orthopedic surgery and Colonoscopy (N/A, 10/31/2023).  Family and Social History:  Reviewed, and he  reports that he has quit smoking. His smoking use included cigarettes. He has never used smokeless tobacco. He reports that he does not currently use alcohol. He reports that he does not use drugs.  Reviewed, and family history is not on file.    RECENT DIAGNOSTIC STUDIES:   Labs:    Coagulation studies:  Recent Labs   Lab Test 07/05/23  1033   INR 0.95        Lipid panel:  Recent Labs   Lab Test 07/05/23  1249   CHOL 132   HDL 44   LDL 73   TRIG 77       HbA1C:  No lab results found.    Troponin:  No lab results found.  No lab results found.  No lab results found.    Imaging:   See HPI  Cardiac event monitor  -Sinus rhythm with occasional PVC     REVIEW OF SYSTEMS:                                                      10-point review of systems is negative except as mentioned above in HPI.    EXAM:                                                      Physical Exam:   Vitals: No vitals were obtained today due to virtual visit.     MENTAL STATUS: Alert, attentive.   GENERAL: Healthy, alert and no  distress  EYES: Eyes grossly normal to inspection.  No discharge   RESP: No audible wheeze, cough, or visible cyanosis.  No visible retractions or increased work of breathing.    MS: No gross musculoskeletal defects noted and normal range of motion of the upper extremities.  SKIN: Visible skin clear. No significant rash, abnormal pigmentation or lesions to face or neck.  NEURO: Cranial nerves grossly intact.  Mentation and speech appropriate for age. Speech is fluent. Normal comprehension. Normal concentration. Adequate fund of knowledge. EOMI with normal smooth pursuit, facial movements symmetric, hearing not formally tested but intact to conversation, no dysarthria.  PSYCH: Mentation appears normal, affect normal/bright, judgement and insight intact, normal speech and appearance well-groomed.     ASSESSMENT and PLAN:                                                      Assessment:    ICD-10-CM    1. TIA (transient ischemic attack)  G45.9           Francisco Miguel is a 68 year old male presenting for follow-up for TIA. He was hospitalized at [facility] on 07/05/23 - 07/06/23. Prior to the hospital stay, he had a past medical history of HLD, pre-DM, Prior TIA in 2007 in [place].  On PTA Lipitor 10 mg daily, not on PTA aspirin. He presented to the hospital with dizziness, decreased coordination R arm and right arm and leg weakness.  Patient reports full resolution of his symptoms and denies any reoccurrence since his hospitalization.  Patient would like further workup for neuropathy.  He will establish with a neurologist in the near future.  Francisco understands and agrees with this plan.    Plan:     Secondary prevention  -Continue aspirin 81 mg tablet daily     Lifestyle  - mediterranean diet  - exercise    Risk Factors  Elevated cholesterol levels   LDL: 88  - Goal < 70 mg/dl  - Continue preventive therapy: Atorvastatin as prescribed     Pre-Diabetes   A1c: 5.7%  - Goal: < 7.0%  - Continue preventive therapy:  Metformin as prescribed     Obstructive sleep apnea  -Wear CPAP nightly    --- Plan to follow-up with your primary care provider to manage your vascular risk factors  --- Plan on follow up in the Neurology Clinic in 6 months with neurologist for neuropathy/new visit/in person.  --- Please feel free to reach out if you have any further questions or concerns.  --- Seek immediate medical attention if an emergency arises or if your health becomes progressively worse.     For any acute neurologic deficits he was advised to  go directly to the hospital rather than call the clinic.    It was a pleasure to meet you today!     Total Time: Total time spent for face to face visit, reviewing labs/imaging studies, counseling and coordination of care was: 30 Minutes spent on the date of the encounter doing chart review, review of test results, patient visit, documentation, and discussion with family     This note was dictated using voice recognition software.  Any grammatical or context distortions are unintentional and inherent to the software.    Rachel Mckeon, DAVID, APRN, CNP  [facility]         Again, thank you for allowing me to participate in the care of your patient.        Sincerely,        STEPHANIE Lantigua CNP

## 2024-03-14 NOTE — TELEPHONE ENCOUNTER
Pt mentioned that someone already reached out to schedule him for a neurology visit in [place]. Will call back if theres any concerns.

## 2024-03-14 NOTE — TELEPHONE ENCOUNTER
Hello, pt had a virtual appointment today with Rachel Mckeon. Please assist in getting pt scheduled for follow-up appt. Check out notes below.     --- Plan on follow up in the Neurology Clinic in 6 months with neurologist for neuropathy/new visit/in person.       Thank you!  Uma Mcmanus, Virtual Visit Facilitator

## 2024-03-14 NOTE — PATIENT INSTRUCTIONS
Plan:     Secondary prevention  -Continue aspirin 81 mg tablet daily     Lifestyle  - mediterranean diet  - exercise    Risk Factors  Elevated cholesterol levels   LDL: 88  - Goal < 70 mg/dl  - Continue preventive therapy: Atorvastatin as prescribed     Pre-Diabetes   A1c: 5.7%  - Goal: < 7.0%  - Continue preventive therapy: Metformin as prescribed     Obstructive sleep apnea  -Wear CPAP nightly    --- Plan to follow-up with your primary care provider to manage your vascular risk factors  --- Plan on follow up in the Neurology Clinic in 6 months with neurologist for neuropathy/new visit/in person.  --- Please feel free to reach out if you have any further questions or concerns.  --- Seek immediate medical attention if an emergency arises or if your health becomes progressively worse.     For any acute neurologic deficits he was advised to  go directly to the hospital rather than call the clinic.    It was a pleasure to meet you today!

## 2024-03-14 NOTE — PROGRESS NOTES
"Virtual Visit Details    Type of service:  Video Visit   Video Start Time: {video visit start/end time for provider to select:338497}  Video End Time:{video visit start/end time for provider to select:701183}    Originating Location (pt. Location): {video visit patient location:693099::\"Home\"}  {PROVIDER LOCATION On-site should be selected for visits conducted from your clinic location or adjoining [facility] hospital, academic office, or other nearby [facility] building. Off-site should be selected for all other provider locations, including home:220680}  Distant Location (provider location):  {virtual location provider:814049}  Platform used for Video Visit: {Virtual Visit Platforms:109458::\"Animail\"}    "

## 2025-01-12 ENCOUNTER — HEALTH MAINTENANCE LETTER (OUTPATIENT)
Age: 70
End: 2025-01-12

## 2025-07-01 ENCOUNTER — HOSPITAL ENCOUNTER (INPATIENT)
Facility: CLINIC | Age: 70
End: 2025-07-01
Attending: EMERGENCY MEDICINE | Admitting: HOSPITALIST
Payer: COMMERCIAL

## 2025-07-01 DIAGNOSIS — K57.20 DIVERTICULITIS OF COLON WITH PERFORATION: Primary | ICD-10-CM

## 2025-07-01 DIAGNOSIS — K57.20 DIVERTICULITIS OF LARGE INTESTINE WITH ABSCESS WITHOUT BLEEDING: ICD-10-CM

## 2025-07-01 LAB
CRP SERPL-MCNC: 73.35 MG/L
EST. AVERAGE GLUCOSE BLD GHB EST-MCNC: 123 MG/DL
GLUCOSE BLDC GLUCOMTR-MCNC: 121 MG/DL (ref 70–99)
GLUCOSE BLDC GLUCOMTR-MCNC: 134 MG/DL (ref 70–99)
HBA1C MFR BLD: 5.9 %

## 2025-07-01 PROCEDURE — 83036 HEMOGLOBIN GLYCOSYLATED A1C: CPT | Performed by: PHYSICIAN ASSISTANT

## 2025-07-01 PROCEDURE — 99207 PR APP CREDIT; MD BILLING SHARED VISIT: CPT | Mod: FS | Performed by: COLON & RECTAL SURGERY

## 2025-07-01 PROCEDURE — 86140 C-REACTIVE PROTEIN: CPT | Performed by: EMERGENCY MEDICINE

## 2025-07-01 PROCEDURE — 250N000011 HC RX IP 250 OP 636: Performed by: HOSPITALIST

## 2025-07-01 PROCEDURE — 250N000013 HC RX MED GY IP 250 OP 250 PS 637: Performed by: PHYSICIAN ASSISTANT

## 2025-07-01 PROCEDURE — 99223 1ST HOSP IP/OBS HIGH 75: CPT | Performed by: PHYSICIAN ASSISTANT

## 2025-07-01 PROCEDURE — 36415 COLL VENOUS BLD VENIPUNCTURE: CPT | Performed by: EMERGENCY MEDICINE

## 2025-07-01 PROCEDURE — 99222 1ST HOSP IP/OBS MODERATE 55: CPT | Mod: FS

## 2025-07-01 PROCEDURE — 96365 THER/PROPH/DIAG IV INF INIT: CPT

## 2025-07-01 PROCEDURE — 120N000001 HC R&B MED SURG/OB

## 2025-07-01 PROCEDURE — 99285 EMERGENCY DEPT VISIT HI MDM: CPT | Mod: 25

## 2025-07-01 PROCEDURE — 36415 COLL VENOUS BLD VENIPUNCTURE: CPT | Performed by: PHYSICIAN ASSISTANT

## 2025-07-01 PROCEDURE — 250N000011 HC RX IP 250 OP 636: Performed by: PHYSICIAN ASSISTANT

## 2025-07-01 PROCEDURE — 99207 PR NO CHARGE LOS: CPT | Performed by: HOSPITALIST

## 2025-07-01 PROCEDURE — 258N000003 HC RX IP 258 OP 636: Performed by: PHYSICIAN ASSISTANT

## 2025-07-01 PROCEDURE — 250N000011 HC RX IP 250 OP 636: Performed by: EMERGENCY MEDICINE

## 2025-07-01 RX ORDER — METFORMIN HYDROCHLORIDE 500 MG/1
500 TABLET, EXTENDED RELEASE ORAL EVERY MORNING
Status: ACTIVE | OUTPATIENT
Start: 2025-07-02

## 2025-07-01 RX ORDER — ONDANSETRON 2 MG/ML
4 INJECTION INTRAMUSCULAR; INTRAVENOUS EVERY 6 HOURS PRN
Status: ACTIVE | OUTPATIENT
Start: 2025-07-01

## 2025-07-01 RX ORDER — ALBUTEROL SULFATE 90 UG/1
2 INHALANT RESPIRATORY (INHALATION) EVERY 6 HOURS PRN
Status: ACTIVE | OUTPATIENT
Start: 2025-07-01

## 2025-07-01 RX ORDER — MEROPENEM 1 G/1
1 INJECTION, POWDER, FOR SOLUTION INTRAVENOUS EVERY 8 HOURS
Status: DISPENSED | OUTPATIENT
Start: 2025-07-01

## 2025-07-01 RX ORDER — AMOXICILLIN 250 MG
2 CAPSULE ORAL 2 TIMES DAILY PRN
Status: ACTIVE | OUTPATIENT
Start: 2025-07-01

## 2025-07-01 RX ORDER — LIDOCAINE 40 MG/G
CREAM TOPICAL
Status: DISCONTINUED | OUTPATIENT
Start: 2025-07-01 | End: 2025-07-01

## 2025-07-01 RX ORDER — EPINEPHRINE 0.3 MG/.3ML
0.3 INJECTION SUBCUTANEOUS
Status: DISCONTINUED | OUTPATIENT
Start: 2025-07-01 | End: 2025-07-01

## 2025-07-01 RX ORDER — DEXTROSE MONOHYDRATE 25 G/50ML
25-50 INJECTION, SOLUTION INTRAVENOUS
Status: ACTIVE | OUTPATIENT
Start: 2025-07-01

## 2025-07-01 RX ORDER — ESCITALOPRAM OXALATE 5 MG/1
5 TABLET ORAL DAILY
Status: DISPENSED | OUTPATIENT
Start: 2025-07-02

## 2025-07-01 RX ORDER — NICOTINE POLACRILEX 4 MG
15-30 LOZENGE BUCCAL
Status: ACTIVE | OUTPATIENT
Start: 2025-07-01

## 2025-07-01 RX ORDER — ATORVASTATIN CALCIUM 20 MG/1
20 TABLET, FILM COATED ORAL DAILY
Status: DISPENSED | OUTPATIENT
Start: 2025-07-02

## 2025-07-01 RX ORDER — NALOXONE HYDROCHLORIDE 0.4 MG/ML
0.2 INJECTION, SOLUTION INTRAMUSCULAR; INTRAVENOUS; SUBCUTANEOUS
Status: ACTIVE | OUTPATIENT
Start: 2025-07-01

## 2025-07-01 RX ORDER — PROCHLORPERAZINE MALEATE 5 MG/1
5 TABLET ORAL EVERY 6 HOURS PRN
Status: ACTIVE | OUTPATIENT
Start: 2025-07-01

## 2025-07-01 RX ORDER — ONDANSETRON 4 MG/1
4 TABLET, ORALLY DISINTEGRATING ORAL EVERY 6 HOURS PRN
Status: ACTIVE | OUTPATIENT
Start: 2025-07-01

## 2025-07-01 RX ORDER — NALOXONE HYDROCHLORIDE 0.4 MG/ML
0.4 INJECTION, SOLUTION INTRAMUSCULAR; INTRAVENOUS; SUBCUTANEOUS
Status: ACTIVE | OUTPATIENT
Start: 2025-07-01

## 2025-07-01 RX ORDER — PIPERACILLIN SODIUM, TAZOBACTAM SODIUM 3; .375 G/15ML; G/15ML
3.38 INJECTION, POWDER, LYOPHILIZED, FOR SOLUTION INTRAVENOUS EVERY 6 HOURS
Status: DISCONTINUED | OUTPATIENT
Start: 2025-07-01 | End: 2025-07-03

## 2025-07-01 RX ORDER — DIPHENHYDRAMINE HYDROCHLORIDE 50 MG/ML
25 INJECTION, SOLUTION INTRAMUSCULAR; INTRAVENOUS EVERY 6 HOURS PRN
Status: DISPENSED | OUTPATIENT
Start: 2025-07-01

## 2025-07-01 RX ORDER — PIPERACILLIN SODIUM, TAZOBACTAM SODIUM 3; .375 G/15ML; G/15ML
3.38 INJECTION, POWDER, LYOPHILIZED, FOR SOLUTION INTRAVENOUS ONCE
Status: COMPLETED | OUTPATIENT
Start: 2025-07-01 | End: 2025-07-01

## 2025-07-01 RX ORDER — AMOXICILLIN 250 MG
1 CAPSULE ORAL 2 TIMES DAILY PRN
Status: ACTIVE | OUTPATIENT
Start: 2025-07-01

## 2025-07-01 RX ORDER — VITAMIN B COMPLEX
50 TABLET ORAL DAILY
Status: ACTIVE | OUTPATIENT
Start: 2025-07-02

## 2025-07-01 RX ORDER — ACETAMINOPHEN 325 MG/1
975 TABLET ORAL EVERY 8 HOURS PRN
Status: DISPENSED | OUTPATIENT
Start: 2025-07-01

## 2025-07-01 RX ORDER — DIPHENHYDRAMINE HCL 25 MG
25 CAPSULE ORAL EVERY 6 HOURS PRN
Status: ACTIVE | OUTPATIENT
Start: 2025-07-01

## 2025-07-01 RX ORDER — OXYCODONE HYDROCHLORIDE 5 MG/1
5 TABLET ORAL EVERY 4 HOURS PRN
Status: ACTIVE | OUTPATIENT
Start: 2025-07-01

## 2025-07-01 RX ORDER — FINASTERIDE 5 MG/1
5 TABLET, FILM COATED ORAL DAILY
Status: DISPENSED | OUTPATIENT
Start: 2025-07-02

## 2025-07-01 RX ORDER — ALBUTEROL SULFATE 90 UG/1
2 INHALANT RESPIRATORY (INHALATION) EVERY 6 HOURS PRN
Status: ON HOLD | COMMUNITY

## 2025-07-01 RX ORDER — METHYLPREDNISOLONE SODIUM SUCCINATE 125 MG/2ML
60 INJECTION INTRAMUSCULAR; INTRAVENOUS ONCE
Status: COMPLETED | OUTPATIENT
Start: 2025-07-01 | End: 2025-07-01

## 2025-07-01 RX ORDER — ASPIRIN 81 MG/1
81 TABLET ORAL DAILY
Status: ACTIVE | OUTPATIENT
Start: 2025-07-02

## 2025-07-01 RX ORDER — SODIUM CHLORIDE, SODIUM LACTATE, POTASSIUM CHLORIDE, CALCIUM CHLORIDE 600; 310; 30; 20 MG/100ML; MG/100ML; MG/100ML; MG/100ML
INJECTION, SOLUTION INTRAVENOUS CONTINUOUS
Status: ACTIVE | OUTPATIENT
Start: 2025-07-01

## 2025-07-01 RX ORDER — AMOXICILLIN 250 MG
1 CAPSULE ORAL 2 TIMES DAILY
Status: DISCONTINUED | OUTPATIENT
Start: 2025-07-01 | End: 2025-07-03

## 2025-07-01 RX ORDER — AMOXICILLIN 250 MG
2 CAPSULE ORAL 2 TIMES DAILY
Status: DISCONTINUED | OUTPATIENT
Start: 2025-07-01 | End: 2025-07-03

## 2025-07-01 RX ADMIN — SENNOSIDES AND DOCUSATE SODIUM 1 TABLET: 8.6; 5 TABLET ORAL at 21:49

## 2025-07-01 RX ADMIN — MEROPENEM 1 G: 1 INJECTION, POWDER, FOR SOLUTION INTRAVENOUS at 21:50

## 2025-07-01 RX ADMIN — ACETAMINOPHEN 975 MG: 325 TABLET ORAL at 15:54

## 2025-07-01 RX ADMIN — FAMOTIDINE 20 MG: 10 INJECTION, SOLUTION INTRAVENOUS at 21:50

## 2025-07-01 RX ADMIN — PIPERACILLIN AND TAZOBACTAM 3.38 G: 3; .375 INJECTION, POWDER, FOR SOLUTION INTRAVENOUS at 17:05

## 2025-07-01 RX ADMIN — METHYLPREDNISOLONE SODIUM SUCCINATE 62.5 MG: 125 INJECTION, POWDER, FOR SOLUTION INTRAMUSCULAR; INTRAVENOUS at 20:41

## 2025-07-01 RX ADMIN — SODIUM CHLORIDE, SODIUM LACTATE, POTASSIUM CHLORIDE, AND CALCIUM CHLORIDE: .6; .31; .03; .02 INJECTION, SOLUTION INTRAVENOUS at 13:26

## 2025-07-01 RX ADMIN — DIPHENHYDRAMINE HYDROCHLORIDE 25 MG: 50 INJECTION, SOLUTION INTRAMUSCULAR; INTRAVENOUS at 20:42

## 2025-07-01 RX ADMIN — PIPERACILLIN AND TAZOBACTAM 3.38 G: 3; .375 INJECTION, POWDER, FOR SOLUTION INTRAVENOUS at 11:12

## 2025-07-01 ASSESSMENT — ACTIVITIES OF DAILY LIVING (ADL)
ADLS_ACUITY_SCORE: 46
ADLS_ACUITY_SCORE: 26
ADLS_ACUITY_SCORE: 46
ADLS_ACUITY_SCORE: 26
ADLS_ACUITY_SCORE: 26
ADLS_ACUITY_SCORE: 46
ADLS_ACUITY_SCORE: 46
ADLS_ACUITY_SCORE: 26

## 2025-07-01 NOTE — ED PROVIDER NOTES
Emergency Department Note      History of Present Illness     Chief Complaint   Abdominal Pain    HPI   Francisco Miguel is a 70 year old male with a history of prediabetes who presents to the emergency department for abdominal pain. The patient states that since yesterday, he has been experiencing left lower quadrant abdominal pain that worsened throughout the night in which this morning, he presented to Urgent Care and had a CT abdomen pelvis that showed he has diverticulitis with abscess. He notes that he has a hx of diverticulitis and his last known episode of this was 4-5 months ago. He has had a normal bowel movement today. He has a hx of prediabetes.    Independent Historian   None    Review of External Notes   Reviewed patient's Urgent Care note, patient's CT abdomen pelvis showed diverticulitis with abscess.    Past Medical History     Medical History and Problem List   Past Medical History:   Diagnosis Date    Diabetes (H)     High cholesterol     TIA (transient ischemic attack)      Medications   aspirin 81 MG EC tablet  atorvastatin (LIPITOR) 20 MG tablet  doxazosin (CARDURA) 4 MG tablet  EPINEPHrine (ANY BX GENERIC EQUIV) 0.3 MG/0.3ML injection 2-pack  escitalopram (LEXAPRO) 5 MG tablet  finasteride (PROSCAR) 5 MG tablet  fish oil-omega-3 fatty acids 1000 MG capsule  metFORMIN (GLUCOPHAGE XR) 500 MG 24 hr tablet      Surgical History   Past Surgical History:   Procedure Laterality Date    COLONOSCOPY N/A 10/31/2023    Procedure: Colonoscopy with biopsy by using cold forcep;  Surgeon: Annamaria Lane MD;  Location:  GI    ORTHOPEDIC SURGERY       Physical Exam     Patient Vitals for the past 24 hrs:   BP Temp Temp src Pulse Resp SpO2 Weight   07/01/25 1157 (!) 143/69 98  F (36.7  C) Oral 86 -- 96 % --   07/01/25 1044 108/67 98.1  F (36.7  C) Temporal 101 16 98 % 90.8 kg (200 lb 2.8 oz)     Physical Exam  Vitals reviewed.   HENT:      Mouth/Throat:      Mouth: Mucous membranes are moist.    Cardiovascular:      Rate and Rhythm: Normal rate.   Pulmonary:      Effort: Pulmonary effort is normal.   Abdominal:      Tenderness: There is abdominal tenderness in the left lower quadrant.      Hernia: No hernia is present.   Skin:     General: Skin is warm.      Capillary Refill: Capillary refill takes less than 2 seconds.   Neurological:      General: No focal deficit present.      Mental Status: He is alert.   Psychiatric:         Mood and Affect: Mood normal.           Diagnostics     Lab Results   Labs Ordered and Resulted from Time of ED Arrival to Time of ED Departure   CRP INFLAMMATION - Abnormal       Result Value    CRP Inflammation 73.35 (*)      Imaging   None    Independent Interpretation   None    ED Course      Medications Administered   Medications   piperacillin-tazobactam (ZOSYN) 3.375 g vial to attach to  mL bag (0 g Intravenous Stopped 7/1/25 1151)     Discussion of Management   Admitting Hospitalist, Evette Orozco PA-C    ED Course   ED Course as of 07/01/25 1205   Tue Jul 01, 2025   1050 I obtained history and examined the patient as noted above and explained findings and admission.      1136 Spoke with admitting hospitalist, Evette Orozco PA-C. Patient accepted for admission under the care of Dr. Lopez.       Additional Documentation  None    Medical Decision Making / Diagnosis     CMS Diagnoses  None    MIPS   None    MDM   Francisco Miguel is a 70 year old male who presents with recurrent left lower quadrant pain outpatient CT confirms diverticulitis with abscess.  Not a candidate for outpatient treatment due to abscess finding and allergies to Cipro and Flagyl recommend inpatient IV antibiotics care was discussed with the hospitalist was admitted as an inpatient.    Disposition   The patient was admitted to the hospital.     Diagnosis     ICD-10-CM    1. Diverticulitis of large intestine with abscess without bleeding  K57.20          Scribe Disclosure:  Brant CHRISTINA,  am serving as a scribe at 10:57 AM on 7/1/2025 to document services personally performed by Mike Hawk MD based on my observations and the provider's statements to me.        Mike Hawk MD  07/01/25 2515

## 2025-07-01 NOTE — PLAN OF CARE
"Goal Outcome Evaluation:      Plan of Care Reviewed With: patient    Overall Patient Progress: improvingOverall Patient Progress: improving    Outcome Evaluation: A&Ox4. VSS. Up independently. On room air. LS clear. BS+. C/o LLQ abdominal and headache. PRN Tylenol given. Denies nausea or SOB. PIV infusing LR a@ 100 mL/hr. Abx Zosyn. On glucose monitoring. Consult: Colorectal surgery.      Problem: Adult Inpatient Plan of Care  Goal: Plan of Care Review  Description: The Plan of Care Review/Shift note should be completed every shift.  The Outcome Evaluation is a brief statement about your assessment that the patient is improving, declining, or no change.  This information will be displayed automatically on your shift  note.  Outcome: Progressing  Flowsheets (Taken 7/1/2025 1616)  Outcome Evaluation: A&Ox4. VSS. Up independently. On room air. LS clear. BS+. C/o LLQ abdominal and headache. PRN Tylenol given. Denies nausea or SOB. PIV infusing LR a@ 100 mL/hr. Abx Zosyn. On glucose monitoring. Consult: Colorectal surgery.  Plan of Care Reviewed With: patient  Overall Patient Progress: improving  Goal: Patient-Specific Goal (Individualized)  Description: You can add care plan individualizations to a care plan. Examples of Individualization might be:  \"Parent requests to be called daily at 9am for status\", \"I have a hard time hearing out of my right ear\", or \"Do not touch me to wake me up as it startles  me\".  Outcome: Progressing  Goal: Absence of Hospital-Acquired Illness or Injury  Outcome: Progressing  Intervention: Identify and Manage Fall Risk  Recent Flowsheet Documentation  Taken 7/1/2025 1527 by Ivy Marin, RN  Safety Promotion/Fall Prevention:   clutter free environment maintained   nonskid shoes/slippers when out of bed   safety round/check completed  Taken 7/1/2025 1357 by Ivy Marin, RN  Safety Promotion/Fall Prevention:   clutter free environment maintained   nonskid shoes/slippers when out of bed   " safety round/check completed  Intervention: Prevent Skin Injury  Recent Flowsheet Documentation  Taken 7/1/2025 1527 by Ivy Marin RN  Body Position: position changed independently  Taken 7/1/2025 1357 by Ivy Marin RN  Body Position: position changed independently  Intervention: Prevent and Manage VTE (Venous Thromboembolism) Risk  Recent Flowsheet Documentation  Taken 7/1/2025 1527 by Ivy Marin RN  VTE Prevention/Management: SCDs off (sequential compression devices)  Taken 7/1/2025 1357 by Ivy Marin RN  VTE Prevention/Management: SCDs off (sequential compression devices)  Intervention: Prevent Infection  Recent Flowsheet Documentation  Taken 7/1/2025 1527 by Ivy Marin RN  Infection Prevention:   hand hygiene promoted   single patient room provided  Taken 7/1/2025 1357 by Ivy Marin RN  Infection Prevention:   hand hygiene promoted   single patient room provided  Goal: Optimal Comfort and Wellbeing  Outcome: Progressing  Intervention: Monitor Pain and Promote Comfort  Recent Flowsheet Documentation  Taken 7/1/2025 1527 by Ivy Marin RN  Pain Management Interventions: medication (see MAR)  Taken 7/1/2025 1357 by Ivy Marin RN  Pain Management Interventions: declines  Intervention: Provide Person-Centered Care  Recent Flowsheet Documentation  Taken 7/1/2025 1527 by Ivy Marin RN  Trust Relationship/Rapport: care explained  Taken 7/1/2025 1357 by Ivy Marin RN  Trust Relationship/Rapport: care explained  Goal: Readiness for Transition of Care  Outcome: Progressing  Intervention: Mutually Develop Transition Plan  Recent Flowsheet Documentation  Taken 7/1/2025 1400 by Ivy Marin RN  Equipment Currently Used at Home: none

## 2025-07-01 NOTE — PHARMACY-ADMISSION MEDICATION HISTORY
Pharmacist Admission Medication History    Admission medication history is complete. The information provided in this note is only as accurate as the sources available at the time of the update.    Information Source(s): Patient via in-person  Pertinent Information: patient still taking 5 mg of escitalopram daily (has NOT increased to 10 mg daily yet)    Changes made to PTA medication list:  Added: albuterol inhaler, vitamin D3  Deleted: doxazosin  Changed: None    Allergies reviewed with patient and updates made in EHR: no  Medication History Completed By: Rudy Miller RPH 7/1/2025 12:39 PM    PTA Med List   Medication Sig Last Dose/Taking    albuterol (PROAIR HFA/PROVENTIL HFA/VENTOLIN HFA) 108 (90 Base) MCG/ACT inhaler Inhale 2 puffs into the lungs every 6 hours as needed for shortness of breath, wheezing or cough. More than a month    aspirin 81 MG EC tablet Take 1 tablet (81 mg) by mouth daily 7/1/2025 Morning    atorvastatin (LIPITOR) 20 MG tablet Take 20 mg by mouth daily 7/1/2025 Morning    cholecalciferol (VITAMIN D3) 25 mcg (1000 units) capsule Take 2 capsules by mouth daily. 7/1/2025 Morning    EPINEPHrine (ANY BX GENERIC EQUIV) 0.3 MG/0.3ML injection 2-pack Inject 0.3 mLs (0.3 mg) into the muscle once as needed for anaphylaxis May repeat one time in 5-15 minutes if response to initial dose is inadequate. Taking As Needed    escitalopram (LEXAPRO) 5 MG tablet Take 5 mg by mouth daily 7/1/2025 Morning    finasteride (PROSCAR) 5 MG tablet Take 5 mg by mouth daily 7/1/2025 Morning    fish oil-omega-3 fatty acids 1000 MG capsule Take 2 g by mouth daily 7/1/2025 Morning    metFORMIN (GLUCOPHAGE XR) 500 MG 24 hr tablet Take 500 mg by mouth every morning 7/1/2025 Morning

## 2025-07-01 NOTE — ED TRIAGE NOTES
Referred to ED for diverticulitis with abscess. Pt states that he was seen at urgent care today and referred to ED following CT scan. Abdominal pain started yesterday. Pt reports he has had diverticulitis several times.      Also had blood work and UA done at urgent care.

## 2025-07-01 NOTE — H&P
[facility]    History and Physical - Hospitalist Service       Date of Admission:  7/1/2025    Assessment & Plan      Francisco Miguel is a 70 year old male admitted on 7/1/2025. He has a PMH significant for prediabetes, BPH, HLD,TIA previous episodes of diverticulitis (last admission 8/2023) who presents to the ED from  with another bout of diverticulitis with microperforation.   States that he started having pain yesterday, went to  today and Ct shows acute right sided diverticulitis with intramural abscess vs contained perforation. He has been seen by Dr. Lane in the past and his last C-scope was in 8/23 showing stable diverticula.     Lab work at  (Care Everywhere) shows WBC of 16K but normal BMP. CT A/P: shows acute proximal sigmoid colon diverticulitis with associated 1.9 x 3.5 x 3.4 cm intramural abscess versus focal contained perforation. She was started on IV zosyn and recommended for admission.     #Acute complicated diverticulitis with intramural abscess vs microperf  - Clinically looks stable, not toxic  - CRS consult as this is likely the 3/4th episode of diverticulitis   - CLD for now   - Pain control  - Cont Zosyn for now      #Depression  - Resume Lexapro     #DM   - hold metformin for now   - Control with SSi for now       Diet: Clear Liquid Diet    DVT Prophylaxis: Ambulate every shift  Vitale Catheter: Not present  Lines: None     Cardiac Monitoring: None  Code Status: Full Code      Clinically Significant Risk Factors Present on Admission                 # Drug Induced Platelet Defect: home medication list includes an antiplatelet medication                        Disposition Plan     Medically Ready for Discharge: Anticipated in 2-4 Days         The patient's care was discussed with the Patient's Family.    Evette Orozco PA-C  Hospitalist Service  [facility]  Securely message with [employer] (more info)  Text page via [employer] Paging/Directory      ______________________________________________________________________    Chief Complaint   Abd pain     History is obtained from the patient    History of Present Illness   Francisco Miguel is a 70 year old male admitted on 7/1/2025. He has a PMH significant for prediabetes, BPH, HLD,TIA previous episodes of diverticulitis (last admission 8/2023) who presents to the ED from  with another bout of diverticulitis with microperforation.   States that he started having pain yesterday, went to  today and Ct shows acute right sided diverticulitis with intramural abscess vs contained perforation. He has been seen by Dr. Lane in the past and his last C-scope was in 8/23 showing stable diverticula.     Lab work at  (Care Everywhere) shows WBC of 16K but normal BMP. CT A/P: shows acute proximal sigmoid colon diverticulitis with associated 1.9 x 3.5 x 3.4 cm intramural abscess versus focal contained perforation. She was started on IV zosyn and recommended for admission.       Past Medical History    Past Medical History:   Diagnosis Date    Diabetes (H)     High cholesterol     TIA (transient ischemic attack)        Past Surgical History   Past Surgical History:   Procedure Laterality Date    COLONOSCOPY N/A 10/31/2023    Procedure: Colonoscopy with biopsy by using cold forcep;  Surgeon: Annamaria Lane MD;  Location:  GI    ORTHOPEDIC SURGERY         Prior to Admission Medications   Prior to Admission Medications   Prescriptions Last Dose Informant Patient Reported? Taking?   EPINEPHrine (ANY BX GENERIC EQUIV) 0.3 MG/0.3ML injection 2-pack   No Yes   Sig: Inject 0.3 mLs (0.3 mg) into the muscle once as needed for anaphylaxis May repeat one time in 5-15 minutes if response to initial dose is inadequate.   albuterol (PROAIR HFA/PROVENTIL HFA/VENTOLIN HFA) 108 (90 Base) MCG/ACT inhaler More than a month  Yes Yes   Sig: Inhale 2 puffs into the lungs every 6 hours as needed for shortness of breath, wheezing or  cough.   aspirin 81 MG EC tablet 7/1/2025 Morning  No Yes   Sig: Take 1 tablet (81 mg) by mouth daily   atorvastatin (LIPITOR) 20 MG tablet 7/1/2025 Morning  Yes Yes   Sig: Take 20 mg by mouth daily   cholecalciferol (VITAMIN D3) 25 mcg (1000 units) capsule 7/1/2025 Morning  Yes Yes   Sig: Take 2 capsules by mouth daily.   escitalopram (LEXAPRO) 5 MG tablet 7/1/2025 Morning  Yes Yes   Sig: Take 5 mg by mouth daily   finasteride (PROSCAR) 5 MG tablet 7/1/2025 Morning  Yes Yes   Sig: Take 5 mg by mouth daily   fish oil-omega-3 fatty acids 1000 MG capsule 7/1/2025 Morning  Yes Yes   Sig: Take 2 g by mouth daily   metFORMIN (GLUCOPHAGE XR) 500 MG 24 hr tablet 7/1/2025 Morning  Yes Yes   Sig: Take 500 mg by mouth every morning      Facility-Administered Medications: None       Physical Exam   Vital Signs: Temp: 98  F (36.7  C) Temp src: Oral BP: (!) 143/69 Pulse: 86   Resp: 16 SpO2: 96 % O2 Device: None (Room air)    Weight: 200 lbs 2.84 oz    GENERAL:  Comfortable.  PSYCH: pleasant, oriented, No acute distress.  HEENT:  PERRLA. Normal conjunctiva, normal hearing, nasal mucosa and Oropharynx are normal.  NECK:  Supple, no neck vein distention, adenopathy or bruits, normal thyroid.  HEART:  Normal S1, S2 with no murmur, no pericardial rub, gallops or S3 or S4.  LUNGS:  Clear to auscultation, normal Respiratory effort. No wheezing, rales or ronchi.  ABDOMEN:  Distended but Soft, no hepatosplenomegaly, mild abd pain in the LLQ, +bowel sounds. Non-tender, non distended.   EXTREMITIES:  No pedal edema, +2 pulses bilateral and equal.  SKIN:  Dry to touch, No rash, wound or ulcerations.  NEUROLOGIC:  CN 2-12 intact, BL 5/5 symmetric upper and lower extremity strength, sensation is intact with no focal deficits.     Medical Decision Making       75 MINUTES SPENT BY ME on the date of service doing chart review, history, exam, documentation & further activities per the note.      Data     I have personally reviewed the following  data over the past 24 hrs:    TSH: N/A T4: N/A A1C: 5.9 (H)     Procal: N/A CRP: 73.35 (H) Lactic Acid: N/A         Imaging results reviewed over the past 24 hrs:   Recent Results (from the past 24 hours)   CT Abdomen Pelvis w Contrast    Narrative    EXAM:  CT ABD PELVIS W IV CONT    INDICATION:  Onset abdominal pain left lower quadrant last night, history of diverticulitis, no abdominal surgical history    COMPARISON:  11/19/2024; 05/27/2025; 08/27/2023    TECHNIQUE:  Images were obtained through the abdomen and pelvis following the administration of 75 mL IOHEXOL 350 MG/ML IV SOLN IV contrast.    FINDINGS:  LOWER CHEST: Redemonstration of 0.6 x 0.7 cm subpleural pulmonary nodule in the medial aspect of the right lung base (series 6, image 39), unchanged compared to prior examination dated 05/27/2025.  Redemonstration of mild scarring/atelectasis at the bilateral lung bases as well.    LIVER: Redemonstration of partially-calcified hypodensity abutting the medial margin of the caudate lobe (series 4, image 13), unchanged compared to multiple prior examinations dating back to at least 08/27/2023.    GALLBLADDER AND BILIARY TREE: Unremarkable. No intrahepatic or extrahepatic biliary ductal dilation.    PANCREAS: Stable in appearance; no CT evidence of acute pancreatitis.    SPLEEN: Unremarkable.    ADRENALS: Unremarkable.    KIDNEYS, URETERS, AND BLADDER: No hydronephrosis bilaterally.  No urinary tract calculi.  No striated nephrograms.  Unremarkable appearances of the bilateral ureters.  Bladder partially decompressed, limiting evaluation.  Mild apparent bladder wall thickening, favored to be artifactual in nature secondary to the bladder's partially-decompressed state.  No discrete/measurable colovesicular fistula.    VESSELS: Foci of calcific and non-calcific atherosclerotic disease throughout the aorta, and its major branches.  No infrarenal abdominal aortic aneurysm.    BOWEL: The stomach and small bowel are  decompressed.  Specifically, no small bowel obstruction.  The terminal ileum and appendix are not inflamed.  Colonic diverticulosis.  Marked inflammatory changes, characterized predominantly by colonic submucosal edema and pericolonic fat stranding, centered on the proximal aspect of the sigmoid colon, where there is a markedly-inflamed diverticulum.  In the region of the inflamed colonic diverticulum, there is a bilobed partially-fluid-filled and partially-gas-filled 1.9 x 3.5 x 3.4 cm focus with posterior and left lateral peripheral postcontrast enhancement, and with a lobulated focus of gas extending anteriorly beyond the expected confines of the colonic serosa, reflecting a partially gas-filled intramural abscess versus a focal contained perforation.  No inflammatory changes are identified throughout the remainder of the colon.    REPRODUCTIVE ORGANS: Prostate gland measuring 4.8 x 3.9 cm.    MESENTERY/PERITONEUM: No pneumatosis, portal venous gas, or large volume intra-abdominal free air.  No adenopathy by size criteria.  No drainable focus of ascites.    RETROPERITONEUM: Mildly prominent, though technically nonenlarged, retroperitoneal lymph nodes, measuring up to 0.8 x 1.2 cm along the left periaortic chain, likely reactive in nature.    ABDOMINAL WALL/SOFT TISSUES: Lobulated fat-containing umbilical hernia, with minimal associated fat stranding, similar compared to prior examination, and of doubtful clinical significance.    BONES: No acute fracture.  Degenerative changes across the bilateral sacroiliac joints.  Degenerative changes of lumbar spine.    Impression    1.  Acute proximal sigmoid colon diverticulitis with associated 1.9 x 3.5 x 3.4 cm intramural abscess versus focal contained perforation.  Specifically, no large volume intra-abdominal free air.    Results called to MAXIME KOTHARI on 7/1/2025 at 9:35 am.    Signed by: Bandar Cardenas 7/1/2025 9:44 AM

## 2025-07-01 NOTE — PROGRESS NOTES
[facility]    ED Boarding Nurse Handoff Addendum Report:    Date/time: 7/1/2025, 12:31 PM    Activity Level: independent    Fall Risk: No    Active Infusions: LR     Current Meds Due: see MAR    Current care needs: colorectal consult    Oxygen requirements (liters/min and/or FiO2): none    Respiratory status: Room air    Vital signs (within last 30 minutes):    Vitals:    07/01/25 1044 07/01/25 1157   BP: 108/67 (!) 143/69   Pulse: 101 86   Resp: 16    Temp: 98.1  F (36.7  C) 98  F (36.7  C)   TempSrc: Temporal Oral   SpO2: 98% 96%   Weight: 90.8 kg (200 lb 2.8 oz)        Focused assessment within last 30 minutes:    Patient A&Ox4. Endorses 3/10 lower abdominal pain, declines intervention at this time. Denies n/v. Tolerating CL PO intake.     ED Boarding Nurse name: Coco Akhtar RN

## 2025-07-01 NOTE — ED NOTES
[facility]  ED Nurse Handoff Report    ED Chief complaint: Abdominal Pain  . ED Diagnosis:   Final diagnoses:   Diverticulitis of large intestine with abscess without bleeding       Allergies:   Allergies   Allergen Reactions    Ciprofloxacin Angioedema     Lip swelling, throat fullness, nausea.      Metronidazole Angioedema     Lip swelling, throat fullness, nausea       Code Status: Full Code    Activity level - Baseline/Home:  independent.  Activity Level - Current:   independent.   Lift room needed: No.   Bariatric: No   Needed: No   Isolation: No.   Infection: Not Applicable.     Respiratory status: Room air    Vital Signs (within 30 minutes):   Vitals:    07/01/25 1044   BP: 108/67   Pulse: 101   Resp: 16   Temp: 98.1  F (36.7  C)   TempSrc: Temporal   SpO2: 98%   Weight: 90.8 kg (200 lb 2.8 oz)       Cardiac Rhythm:  ,      Pain level:    Patient confused: No.   Patient Falls Risk: assistive device/personal items within reach.   Elimination Status: Has voided     Patient Report - Initial Complaint: Abd pain.   Focused Assessment: Pt is a 70 year old male with a history of prediabetes who presents to the emergency department for abdominal pain. The patient states that since yesterday, he has been experiencing left lower quadrant abdominal pain that worsened throughout the night in which this morning, he presented to Urgent Care and was diagnosed with diverticulitis with abscess. He notes that he has a hx of diverticulitis and his last known episode of this was 4-5 months ago. He has had a normal bowel movement today. He has a hx of prediabetes.     Abnormal Results:   Labs Ordered and Resulted from Time of ED Arrival to Time of ED Departure - No data to display     No orders to display       Treatments provided: see MAR  Family Comments: N/A  OBS brochure/video discussed/provided to patient:  N/A  ED Medications:   Medications   piperacillin-tazobactam (ZOSYN) 3.375 g vial to  attach to  mL bag (3.375 g Intravenous $New Bag 7/1/25 1112)       Drips infusing:  No  For the majority of the shift this patient was Green.   Interventions performed were N/A.    Sepsis treatment initiated: No    Cares/treatment/interventions/medications to be completed following ED care: N/A    ED Nurse Name: Tate Pak RN  11:44 AM     RECEIVING UNIT ED HANDOFF REVIEW    Above ED Nurse Handoff Report was reviewed: Yes  Reviewed by: Ivy Marin RN on July 1, 2025 at 1:56 PM   I Sylvia called the ED to inform them the note was read: Yes

## 2025-07-01 NOTE — CONSULTS
[facility]   Consultation      Place of Service: [facility]  Reason for Consultation: Diverticulitis w/ abscess    Consult Requested by: hospitalist    History of Present Illness: Francisco Miguel is a 71 yo M who presents for evaluation of diverticulitis w/ abscess. He had worsening pain and presented [place]. WBC 16.2, hgb 13.6. CT abd/pelvis w/ showed acute proximal sigmoid colon diverticulitis with associated 1.9 x 3.5 x 3.4 cm intramural abscess versus focal contained perforation. He was recommended to present to the ED.      He felt this was similar to his previous episodes with his last flare up in 2023. He had some worsening nausea and small emesis this morning. His last BM was formed and he denies any rectal bleeding. He denies any fever or chills. He is up to date on his colonoscopy 10/31/23 that showed mild diverticulosis in sigmoid, descending, and ascending colon.  He denies any family history of colorectal cancer, UC, or Crohn's.         Pertinent Labs:   Lab Results: personally reviewed   Lab Results   Component Value Date     12/15/2023     09/01/2023     08/31/2023     01/27/2019    CO2 30 12/15/2023    CO2 25 09/01/2023    CO2 23 08/31/2023    CO2 26 01/27/2019    BUN 15.4 12/15/2023    BUN 6.0 09/01/2023    BUN 11.0 08/31/2023    BUN 15 01/27/2019     Lab Results   Component Value Date    WBC 8.2 12/15/2023    WBC 10.3 09/01/2023    WBC 12.8 08/31/2023    WBC 8.8 01/27/2019    HGB 13.1 12/15/2023    HGB 12.5 09/01/2023    HGB 12.0 08/31/2023    HGB 14.3 01/27/2019    HCT 39.8 12/15/2023    HCT 37.4 09/01/2023    HCT 35.8 08/31/2023    HCT 44.1 01/27/2019    MCV 91 12/15/2023    MCV 89 09/01/2023    MCV 89 08/31/2023    MCV 93 01/27/2019     12/15/2023     09/01/2023     08/31/2023     01/27/2019       Pertinent Radiology:     IMAGING:  CT Abd Pelvis W IV Cont  Result Date:  7/1/2025  EXAM: CT ABD PELVIS W IV CONT INDICATION: Onset abdominal pain left lower quadrant last night, history of diverticulitis, no abdominal surgical history COMPARISON: 11/19/2024; 05/27/2025; 08/27/2023 TECHNIQUE: Images were obtained through the abdomen and pelvis following the administration of 75 mL IOHEXOL 350 MG/ML IV SOLN IV contrast. FINDINGS: LOWER CHEST: Redemonstration of 0.6 x 0.7 cm subpleural pulmonary nodule in the medial aspect of the right lung base (series 6, image 39), unchanged compared to prior examination dated 05/27/2025. Redemonstration of mild scarring/atelectasis at the bilateral lung bases as well. LIVER: Redemonstration of partially-calcified hypodensity abutting the medial margin of the caudate lobe (series 4, image 13), unchanged compared to multiple prior examinations dating back to at least 08/27/2023. GALLBLADDER AND BILIARY TREE: Unremarkable. No intrahepatic or extrahepatic biliary ductal dilation. PANCREAS: Stable in appearance; no CT evidence of acute pancreatitis. SPLEEN: Unremarkable. ADRENALS: Unremarkable. KIDNEYS, URETERS, AND BLADDER: No hydronephrosis bilaterally. No urinary tract calculi. No striated nephrograms. Unremarkable appearances of the bilateral ureters. Bladder partially decompressed, limiting evaluation. Mild apparent bladder wall thickening, favored to be artifactual in nature secondary to the bladder's partially-decompressed state. No discrete/measurable colovesicular fistula. VESSELS: Foci of calcific and non-calcific atherosclerotic disease throughout the aorta, and its major branches. No infrarenal abdominal aortic aneurysm. BOWEL: The stomach and small bowel are decompressed. Specifically, no small bowel obstruction. The terminal ileum and appendix are not inflamed. Colonic diverticulosis. Marked inflammatory changes, characterized predominantly by colonic submucosal edema and pericolonic fat stranding, centered on the proximal aspect of the sigmoid  colon, where there is a markedly-inflamed diverticulum. In the region of the inflamed colonic diverticulum, there is a bilobed partially-fluid-filled and partially-gas-filled 1.9 x 3.5 x 3.4 cm focus with posterior and left lateral peripheral postcontrast enhancement, and with a lobulated focus of gas extending anteriorly beyond the expected confines of the colonic serosa, reflecting a partially gas-filled intramural abscess versus a focal contained perforation. No inflammatory changes are identified throughout the remainder of the colon. REPRODUCTIVE ORGANS: Prostate gland measuring 4.8 x 3.9 cm. MESENTERY/PERITONEUM: No pneumatosis, portal venous gas, or large volume intra-abdominal free air. No adenopathy by size criteria. No drainable focus of ascites. RETROPERITONEUM: Mildly prominent, though technically nonenlarged, retroperitoneal lymph nodes, measuring up to 0.8 x 1.2 cm along the left periaortic chain, likely reactive in nature. ABDOMINAL WALL/SOFT TISSUES: Lobulated fat-containing umbilical hernia, with minimal associated fat stranding, similar compared to prior examination, and of doubtful clinical significance. BONES: No acute fracture. Degenerative changes across the bilateral sacroiliac joints. Degenerative changes of lumbar spine.     1. Acute proximal sigmoid colon diverticulitis with associated 1.9 x 3.5 x 3.4 cm intramural abscess versus focal contained perforation. Specifically, no large volume intra-abdominal free air. Results called to MAXIME KOTHARI on 7/1/2025 at 9:35 am. Signed by: Bandar Cardenas 7/1/2025 9:44 AM           Past Medical History:   Diagnosis Date    Diabetes (H)     High cholesterol     TIA (transient ischemic attack)        Past Surgical History:   Procedure Laterality Date    COLONOSCOPY N/A 10/31/2023    Procedure: Colonoscopy with biopsy by using cold forcep;  Surgeon: Annamaria Lane MD;  Location:  GI    ORTHOPEDIC SURGERY         Family History   Problem Relation Age  of Onset    Colon Cancer No family hx of        Social History     Socioeconomic History    Marital status:      Spouse name: Not on file    Number of children: Not on file    Years of education: Not on file    Highest education level: Not on file   Occupational History    Not on file   Tobacco Use    Smoking status: Former     Types: Cigarettes    Smokeless tobacco: Never   Substance and Sexual Activity    Alcohol use: Not Currently     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    Drug use: Never    Sexual activity: Not on file   Other Topics Concern    Not on file   Social History Narrative    Not on file     Social Drivers of Health     Financial Resource Strain: Not At Risk (11/11/2023)    Received from [email or web address]    Financial Resource Strain     Is it hard for you to pay for the very basics like food, housing, medical care or heating?: No   Food Insecurity: Not At Risk (11/11/2023)    Received from [email or web address]    Food Insecurity     Does your food run out before you have the money to buy more?: No   Transportation Needs: Not At Risk (11/11/2023)    Received from [email or web address]    Transportation Needs     Does a lack of transportation keep you from your medical appointments or from getting your medications?: No   Physical Activity: Not on file   Stress: Not on file   Social Connections: Not on file   Interpersonal Safety: Not on file   Housing Stability: Not on file       Current Facility-Administered Medications   Medication Dose Route Frequency Provider Last Rate Last Admin    acetaminophen (TYLENOL) tablet 975 mg  975 mg Oral Q8H PRN Orozco, Evette Chinchilla PA-C        [Held by provider] albuterol (PROVENTIL HFA/VENTOLIN HFA) inhaler  2 puff Inhalation Q6H PRN Orozco, Evette Chinchilla PA-C        [Held by provider] aspirin EC tablet 81 mg  81 mg Oral Daily OrozcoEvette ortiz PA-C        [START ON 7/2/2025] atorvastatin (LIPITOR) tablet 20 mg  20 mg Oral Daily OrozcoEvette PA-C        glucose gel 15-30 g  15-30 g  Oral Q15 Min PRN Orozco, Evette Chinchilla PA-C        Or    dextrose 50 % injection 25-50 mL  25-50 mL Intravenous Q15 Min PRN Orozco, Evette Chinchilla PA-C        Or    glucagon injection 1 mg  1 mg Subcutaneous Q15 Min PRN Orozco, Evette Chinchilla PA-C        [START ON 7/2/2025] escitalopram (LEXAPRO) tablet 5 mg  5 mg Oral Daily Orozco, Evette Chinchilla PA-C        [START ON 7/2/2025] finasteride (PROSCAR) tablet 5 mg  5 mg Oral Daily Orozco, Evette Chinchilla PA-C        insulin aspart (NovoLOG) injection (RAPID ACTING)  1-7 Units Subcutaneous TID AC Orozco, Evette Chinchilla PA-C        insulin aspart (NovoLOG) injection (RAPID ACTING)  1-5 Units Subcutaneous At Bedtime Orozco, Evette Chinchilla PA-C        lactated ringers infusion   Intravenous Continuous Orozco, Evette Chinchilla PA-C 100 mL/hr at 07/01/25 1326 New Bag at 07/01/25 1326    lidocaine (LMX4) cream   Topical Q1H PRN Orozco, Evette Chinchilla PA-C        lidocaine 1 % 0.1-1 mL  0.1-1 mL Other Q1H PRN Orozco, Evette Chinchilla PA-C        [Held by provider] metFORMIN (GLUCOPHAGE XR) 24 hr tablet 500 mg  500 mg Oral QAM Orozco, Evette Chinchilla PA-C        ondansetron (ZOFRAN ODT) ODT tab 4 mg  4 mg Oral Q6H PRN Orozco, Evette Chinchilla PA-C        Or    ondansetron (ZOFRAN) injection 4 mg  4 mg Intravenous Q6H PRN Orozco, Evette Chinchilla PA-C        oxyCODONE (ROXICODONE) tablet 5 mg  5 mg Oral Q4H PRN Orozco, Evette Chinchilla PA-C        oxyCODONE IR (ROXICODONE) half-tab 2.5 mg  2.5 mg Oral Q4H PRN Orozco, Evette Chinchilla PA-C        piperacillin-tazobactam (ZOSYN) 3.375 g vial to attach to  mL bag  3.375 g Intravenous Q6H Orozco, Evette Chinchilla PA-C        prochlorperazine (COMPAZINE) injection 5 mg  5 mg Intravenous Q6H PRN OrozcoEvette PA-C        Or    prochlorperazine (COMPAZINE) tablet 5 mg  5 mg Oral Q6H PRN OrozcoEvette PA-C        senna-docusate (SENOKOT-S/PERICOLACE) 8.6-50 MG per tablet 1 tablet  1 tablet Oral BID PRN Evette Orozco PA-C        Or    senna-docusate (SENOKOT-S/PERICOLACE)  8.6-50 MG per tablet 2 tablet  2 tablet Oral BID PRN OrozcoEvette ortiz PA-C        senna-docusate (SENOKOT-S/PERICOLACE) 8.6-50 MG per tablet 1 tablet  1 tablet Oral BID Evette Orozco PA-C        Or    senna-docusate (SENOKOT-S/PERICOLACE) 8.6-50 MG per tablet 2 tablet  2 tablet Oral BID Evette Orozco PA-C        sodium chloride (PF) 0.9% PF flush 3 mL  3 mL Intracatheter Q8H ALYSSA Evette Orozco PA-C   3 mL at 07/01/25 1326    sodium chloride (PF) 0.9% PF flush 3 mL  3 mL Intracatheter q1 min prn Evette Orozco PA-C   3 mL at 07/01/25 1326    [Held by provider] Vitamin D3 (CHOLECALCIFEROL) tablet 50 mcg  50 mcg Oral Daily Evette Orozco PA-C         Current Outpatient Medications   Medication Sig Dispense Refill    albuterol (PROAIR HFA/PROVENTIL HFA/VENTOLIN HFA) 108 (90 Base) MCG/ACT inhaler Inhale 2 puffs into the lungs every 6 hours as needed for shortness of breath, wheezing or cough.      aspirin 81 MG EC tablet Take 1 tablet (81 mg) by mouth daily 30 tablet 0    atorvastatin (LIPITOR) 20 MG tablet Take 20 mg by mouth daily      cholecalciferol (VITAMIN D3) 25 mcg (1000 units) capsule Take 2 capsules by mouth daily.      EPINEPHrine (ANY BX GENERIC EQUIV) 0.3 MG/0.3ML injection 2-pack Inject 0.3 mLs (0.3 mg) into the muscle once as needed for anaphylaxis May repeat one time in 5-15 minutes if response to initial dose is inadequate. 2 each 0    escitalopram (LEXAPRO) 5 MG tablet Take 5 mg by mouth daily      finasteride (PROSCAR) 5 MG tablet Take 5 mg by mouth daily      fish oil-omega-3 fatty acids 1000 MG capsule Take 2 g by mouth daily      metFORMIN (GLUCOPHAGE XR) 500 MG 24 hr tablet Take 500 mg by mouth every morning         Allergies   Allergen Reactions    Ciprofloxacin Angioedema     Lip swelling, throat fullness, nausea.      Metronidazole Angioedema     Lip swelling, throat fullness, nausea       Intake/Output past 24 hrs:  No intake or output data in the 24 hours ending  07/01/25 1332    Physical Exam:  Temp:  [98  F (36.7  C)-98.3  F (36.8  C)] (P) 98.3  F (36.8  C)  Pulse:  [] (P) 86  Resp:  [16] (P) 16  BP: (108-143)/(67-69) (P) 121/70  SpO2:  [96 %-98 %] (P) 96 %    General: NAD, alert, cooperative  Head: normocephalic, without abnormality / atraumatic  Respiratory: non-labored breathing  Abdomen: soft, tender to palpation in LLQ, moderately distended.  No guarding or rebound  Skin: no rashes or lesions  Musculoskeletal: moves all four extremities equally; no calf edema or tenderness  Psychological: alert and oriented, answers questions appropriately  Neurological: cranial nerves grossly intact    Assessment/Plan: Francisco Miguel is a 71 yo M who presents for evaluation of diverticulitis w/ abscess. He had worsening pain and presented to the urgent care in [place]. WBC 16.2, hgb 13.6. CT abd/pelvis w/ showed acute proximal sigmoid colon diverticulitis with associated 1.9 x 3.5 x 3.4 cm intramural abscess versus focal contained perforation. He was recommended to present to the ED.     1. No acute surgical intervention  2. NPO, w/ IVF. Likely can advance to clears tomorrow.   3. IV abx  4. Will need a colonoscopy in 6-8 weeks. CRS will coordinate timing and schedule  5. CRS will continue to follow.     Medical Decision Making:   Additional workup / testing ordered: None at this time  Procedure / Surgery recommended: None at this time   Old records reviewed - Old labs, notes, imaging  Medications added / changed: None at this time    This case was discussed with: Dr. Lane    Thank you for consulting [facility] regarding this patient's care. Please contact us with questions or concerns.     Level of MDM: Moderate    Leni Parikh PA-C  [facility]  [phone]

## 2025-07-02 LAB
ANION GAP SERPL CALCULATED.3IONS-SCNC: 9 MMOL/L (ref 7–15)
BUN SERPL-MCNC: 11.4 MG/DL (ref 8–23)
CALCIUM SERPL-MCNC: 8.9 MG/DL (ref 8.8–10.4)
CHLORIDE SERPL-SCNC: 107 MMOL/L (ref 98–107)
CREAT SERPL-MCNC: 0.86 MG/DL (ref 0.67–1.17)
EGFRCR SERPLBLD CKD-EPI 2021: >90 ML/MIN/1.73M2
ERYTHROCYTE [DISTWIDTH] IN BLOOD BY AUTOMATED COUNT: 13.5 % (ref 10–15)
GLUCOSE BLDC GLUCOMTR-MCNC: 131 MG/DL (ref 70–99)
GLUCOSE BLDC GLUCOMTR-MCNC: 157 MG/DL (ref 70–99)
GLUCOSE BLDC GLUCOMTR-MCNC: 174 MG/DL (ref 70–99)
GLUCOSE BLDC GLUCOMTR-MCNC: 197 MG/DL (ref 70–99)
GLUCOSE BLDC GLUCOMTR-MCNC: 97 MG/DL (ref 70–99)
GLUCOSE SERPL-MCNC: 174 MG/DL (ref 70–99)
HCO3 SERPL-SCNC: 24 MMOL/L (ref 22–29)
HCT VFR BLD AUTO: 39.7 % (ref 40–53)
HGB BLD-MCNC: 13.3 G/DL (ref 13.3–17.7)
MCH RBC QN AUTO: 30.4 PG (ref 26.5–33)
MCHC RBC AUTO-ENTMCNC: 33.5 G/DL (ref 31.5–36.5)
MCV RBC AUTO: 91 FL (ref 78–100)
PLATELET # BLD AUTO: 280 10E3/UL (ref 150–450)
POTASSIUM SERPL-SCNC: 4.3 MMOL/L (ref 3.4–5.3)
RBC # BLD AUTO: 4.38 10E6/UL (ref 4.4–5.9)
SODIUM SERPL-SCNC: 140 MMOL/L (ref 135–145)
WBC # BLD AUTO: 14.5 10E3/UL (ref 4–11)

## 2025-07-02 PROCEDURE — 36415 COLL VENOUS BLD VENIPUNCTURE: CPT | Performed by: PHYSICIAN ASSISTANT

## 2025-07-02 PROCEDURE — 250N000013 HC RX MED GY IP 250 OP 250 PS 637: Performed by: PHYSICIAN ASSISTANT

## 2025-07-02 PROCEDURE — 99207 PR APP CREDIT; MD BILLING SHARED VISIT: CPT | Mod: FS

## 2025-07-02 PROCEDURE — 80048 BASIC METABOLIC PNL TOTAL CA: CPT | Performed by: PHYSICIAN ASSISTANT

## 2025-07-02 PROCEDURE — 99232 SBSQ HOSP IP/OBS MODERATE 35: CPT | Mod: FS | Performed by: COLON & RECTAL SURGERY

## 2025-07-02 PROCEDURE — 99232 SBSQ HOSP IP/OBS MODERATE 35: CPT | Performed by: HOSPITALIST

## 2025-07-02 PROCEDURE — 85018 HEMOGLOBIN: CPT | Performed by: PHYSICIAN ASSISTANT

## 2025-07-02 PROCEDURE — 258N000003 HC RX IP 258 OP 636: Performed by: PHYSICIAN ASSISTANT

## 2025-07-02 PROCEDURE — 120N000001 HC R&B MED SURG/OB

## 2025-07-02 PROCEDURE — 250N000011 HC RX IP 250 OP 636: Performed by: HOSPITALIST

## 2025-07-02 PROCEDURE — 250N000012 HC RX MED GY IP 250 OP 636 PS 637: Performed by: PHYSICIAN ASSISTANT

## 2025-07-02 RX ADMIN — SENNOSIDES AND DOCUSATE SODIUM 1 TABLET: 8.6; 5 TABLET ORAL at 20:42

## 2025-07-02 RX ADMIN — ATORVASTATIN CALCIUM 20 MG: 20 TABLET, FILM COATED ORAL at 08:12

## 2025-07-02 RX ADMIN — MEROPENEM 1 G: 1 INJECTION, POWDER, FOR SOLUTION INTRAVENOUS at 06:08

## 2025-07-02 RX ADMIN — SODIUM CHLORIDE, SODIUM LACTATE, POTASSIUM CHLORIDE, AND CALCIUM CHLORIDE: .6; .31; .03; .02 INJECTION, SOLUTION INTRAVENOUS at 10:16

## 2025-07-02 RX ADMIN — MEROPENEM 1 G: 1 INJECTION, POWDER, FOR SOLUTION INTRAVENOUS at 20:42

## 2025-07-02 RX ADMIN — MEROPENEM 1 G: 1 INJECTION, POWDER, FOR SOLUTION INTRAVENOUS at 13:02

## 2025-07-02 RX ADMIN — FAMOTIDINE 20 MG: 10 INJECTION, SOLUTION INTRAVENOUS at 08:12

## 2025-07-02 RX ADMIN — FAMOTIDINE 20 MG: 10 INJECTION, SOLUTION INTRAVENOUS at 20:42

## 2025-07-02 RX ADMIN — ESCITALOPRAM OXALATE 5 MG: 5 TABLET, FILM COATED ORAL at 08:11

## 2025-07-02 RX ADMIN — INSULIN ASPART 2 UNITS: 100 INJECTION, SOLUTION INTRAVENOUS; SUBCUTANEOUS at 13:02

## 2025-07-02 RX ADMIN — DIPHENHYDRAMINE HYDROCHLORIDE 25 MG: 50 INJECTION, SOLUTION INTRAMUSCULAR; INTRAVENOUS at 21:12

## 2025-07-02 RX ADMIN — SENNOSIDES AND DOCUSATE SODIUM 2 TABLET: 8.6; 5 TABLET ORAL at 08:11

## 2025-07-02 RX ADMIN — FINASTERIDE 5 MG: 5 TABLET, FILM COATED ORAL at 08:12

## 2025-07-02 ASSESSMENT — ACTIVITIES OF DAILY LIVING (ADL)
ADLS_ACUITY_SCORE: 26

## 2025-07-02 NOTE — PROGRESS NOTES
[facility]  Daily Progress Note    Subjective  Patient reports feeling well this morning. Pain is controlled and he is feeling well. Denies nausea or vomiting. Passing gas although no BM. AVSS.    Objective  Intake/Output last 24 hrs:    Intake/Output Summary (Last 24 hours) at 7/2/2025 1108  Last data filed at 7/2/2025 1016  Gross per 24 hour   Intake 480 ml   Output --   Net 480 ml     Temp:  [97.7  F (36.5  C)-99.1  F (37.3  C)] 97.7  F (36.5  C)  Pulse:  [68-90] 74  Resp:  [16-18] 16  BP: (114-153)/(63-85) 153/85  SpO2:  [94 %-98 %] 94 %    Physical Exam:  General: awake, alert, lying in bed, in no acute distress  Head: normocephalic, atraumatic  Respiratory: non-labored breathing  Abdomen: soft, non tender, non-distended  Skin: No rashes or lesions  Musculoskeletal: moves all four extremities equally  Psychological: alert and oriented, answers questions appropriately    Pertinent Labs  Lab Results: personally reviewed.  Lab Results   Component Value Date     07/02/2025     12/15/2023     09/01/2023     01/27/2019    CO2 24 07/02/2025    CO2 30 12/15/2023    CO2 25 09/01/2023    CO2 26 01/27/2019    BUN 11.4 07/02/2025    BUN 15.4 12/15/2023    BUN 6.0 09/01/2023    BUN 15 01/27/2019     Lab Results   Component Value Date    WBC 14.5 07/02/2025    WBC 8.2 12/15/2023    WBC 10.3 09/01/2023    WBC 8.8 01/27/2019    HGB 13.3 07/02/2025    HGB 13.1 12/15/2023    HGB 12.5 09/01/2023    HGB 14.3 01/27/2019    HCT 39.7 07/02/2025    HCT 39.8 12/15/2023    HCT 37.4 09/01/2023    HCT 44.1 01/27/2019    MCV 91 07/02/2025    MCV 91 12/15/2023    MCV 89 09/01/2023    MCV 93 01/27/2019     07/02/2025     12/15/2023     09/01/2023     01/27/2019       Assessment/Plan: Francisco Miguel is a 71 yo M who presents for evaluation of diverticulitis w/ abscess. He had worsening pain and presented to the urgent care [place]. WBC 16.2, hgb 13.6. CT abd/pelvis w/  showed acute proximal sigmoid colon diverticulitis with associated 1.9 x 3.5 x 3.4 cm intramural abscess versus focal contained perforation. He was recommended to present to the ED.      1. No acute surgical intervention  2. Okay for fulls, w/ IVF.   3. IV abx  4. CRS will continue to follow.     Discussed with Dr. Marlon Parikh, [place]  [facility]  [phone]..............................main

## 2025-07-02 NOTE — PLAN OF CARE
"Goal Outcome Evaluation:      Plan of Care Reviewed With: patient    Overall Patient Progress: improvingOverall Patient Progress: improving    Outcome Evaluation: Patient slept between cares. Denies pain. Denies n/v/d. Afebrile. RA. Pt c/o of throat tightness and subjective difficulty breathing.  after getting IV antibiotic. Kentrell BECERRA paged. Completed bedside assessment. IV steroids, IV benadryl given and started Pepcid BID.  Discontinued zosyn and started meropenem.  Zosyn added to allergy list. Pt in no distress at this time. Feels better. NPO for now.     /66 (BP Location: Right arm)   Pulse 74   Temp 98.5  F (36.9  C) (Oral)   Resp 16   Ht 1.727 m (5' 8\")   Wt 89.9 kg (198 lb 1.6 oz)   SpO2 94%   BMI 30.12 kg/m      Problem: Adult Inpatient Plan of Care  Goal: Plan of Care Review  Description: The Plan of Care Review/Shift note should be completed every shift.  The Outcome Evaluation is a brief statement about your assessment that the patient is improving, declining, or no change.  This information will be displayed automatically on your shift  note.  Outcome: Progressing  Flowsheets (Taken 7/2/2025 3166)  Outcome Evaluation: Patient slept between cares. Denies pain. Denies n/v/d.  Plan of Care Reviewed With: patient  Overall Patient Progress: improving  Goal: Patient-Specific Goal (Individualized)  Description: You can add care plan individualizations to a care plan. Examples of Individualization might be:  \"Parent requests to be called daily at 9am for status\", \"I have a hard time hearing out of my right ear\", or \"Do not touch me to wake me up as it startles  me\".  Outcome: Progressing  Goal: Absence of Hospital-Acquired Illness or Injury  Outcome: Progressing  Intervention: Identify and Manage Fall Risk  Recent Flowsheet Documentation  Taken 7/1/2025 2200 by Maddie Masters RN  Safety Promotion/Fall Prevention:   clutter free environment maintained   nonskid shoes/slippers when out of bed   " safety round/check completed  Intervention: Prevent Skin Injury  Recent Flowsheet Documentation  Taken 7/1/2025 2200 by Maddie Masters RN  Body Position: position changed independently  Intervention: Prevent and Manage VTE (Venous Thromboembolism) Risk  Recent Flowsheet Documentation  Taken 7/1/2025 2200 by Maddie Masters RN  VTE Prevention/Management: SCDs off (sequential compression devices)  Intervention: Prevent Infection  Recent Flowsheet Documentation  Taken 7/1/2025 2200 by Maddie Masters RN  Infection Prevention:   hand hygiene promoted   single patient room provided  Goal: Optimal Comfort and Wellbeing  Outcome: Progressing  Intervention: Provide Person-Centered Care  Recent Flowsheet Documentation  Taken 7/1/2025 2200 by Maddie Masters RN  Trust Relationship/Rapport: care explained  Goal: Readiness for Transition of Care  Outcome: Progressing     Problem: Delirium  Goal: Optimal Coping  Outcome: Progressing  Intervention: Optimize Psychosocial Adjustment to Delirium  Recent Flowsheet Documentation  Taken 7/1/2025 2200 by Maddie Masters RN  Family/Support System Care:   involvement promoted   support provided   self-care encouraged  Goal: Improved Behavioral Control  Outcome: Progressing  Intervention: Minimize Safety Risk  Recent Flowsheet Documentation  Taken 7/1/2025 2200 by Maddie Masters RN  Enhanced Safety Measures: pain management  Trust Relationship/Rapport: care explained  Goal: Improved Attention and Thought Clarity  Outcome: Progressing  Goal: Improved Sleep  Outcome: Progressing  Intervention: Promote Sleep  Recent Flowsheet Documentation  Taken 7/1/2025 2200 by Maddie Masters RN  Sleep/Rest Enhancement:   comfort measures   awakenings minimized     Problem: Intestinal Obstruction  Goal: Optimal Bowel Function  Outcome: Progressing  Intervention: Promote Bowel Function  Recent Flowsheet Documentation  Taken 7/1/2025 2200 by Maddie Masters RN  Body Position: position changed  independently  Head of Bed (HOB) Positioning: HOB at 20-30 degrees  Positioning/Transfer Devices:   pillows   in use  Goal: Fluid and Electrolyte Balance  Outcome: Progressing  Goal: Absence of Infection Signs and Symptoms  Outcome: Progressing  Goal: Optimize Nutrition Status  Outcome: Progressing  Goal: Optimal Pain Control and Function  Outcome: Progressing  Intervention: Prevent or Manage Pain  Recent Flowsheet Documentation  Taken 7/1/2025 2200 by Maddie Masters, RN  Sleep/Rest Enhancement:   comfort measures   awakenings minimized     Problem: Comorbidity Management  Goal: Blood Glucose Levels Within Targeted Range  Outcome: Progressing  Intervention: Monitor and Manage Glycemia  Recent Flowsheet Documentation  Taken 7/1/2025 2200 by Maddie Masters, RN  Medication Review/Management: medications reviewed

## 2025-07-02 NOTE — PROGRESS NOTES
[facility]    Medicine Progress Note - Hospitalist Service    Date of Admission:  7/1/2025    Assessment & Plan      Francisco Miguel is a 70 year old male admitted on 7/1/2025. He has a PMH significant for prediabetes, BPH, HLD,TIA previous episodes of diverticulitis (last admission 8/2023) who presents to the ED from  with another bout of diverticulitis with microperforation.   States that he started having pain yesterday, went to  today and Ct shows acute right sided diverticulitis with intramural abscess vs contained perforation. He has been seen by Dr. Lane in the past and his last C-scope was in 8/23 showing stable diverticula.     Lab work at  ([facility]) shows WBC of 16K but normal BMP. CT A/P: shows acute proximal sigmoid colon diverticulitis with associated 1.9 x 3.5 x 3.4 cm intramural abscess versus focal contained perforation. She was started on IV zosyn and recommended for admission.     #Acute complicated diverticulitis with intramural abscess vs microperf  - Clinically looks stable, not toxic  - CRS consult as this is likely the 3/4th episode of diverticulitis   - CLD for now   - Pain control  - Ertapenem after reaction last night considered allergy to Zosyn  (He does have history of angioedema from cipro and flagyl.  He is not sure if he has ever received penicillins though on review of records he did receive zosyn during prior admissions. Dr Tirado)     #Depression  - Resume Lexapro     #DM   - hold metformin for now   - Control with SSi for now            # Drug Induced Platelet Defect: home medication list includes an antiplatelet medication        Diet: Full Liquid Diet    DVT Prophylaxis: Low Risk/Ambulatory with no VTE prophylaxis indicated  Vitale Catheter: Not present  Lines: None     Cardiac Monitoring: ACTIVE order. Indication: Tachyarrhythmias, acute (48 hours)  Code Status: Full Code      Clinically Significant Risk Factors                              #  "Obesity: Estimated body mass index is 30.12 kg/m  as calculated from the following:    Height as of this encounter: 1.727 m (5' 8\").    Weight as of this encounter: 89.9 kg (198 lb 1.6 oz)., PRESENT ON ADMISSION            Social Drivers of Health    Tobacco Use: Medium Risk (6/27/2025)    Received from [employer]    Patient History     Smoking Tobacco Use: Former     Smokeless Tobacco Use: Never          Disposition Plan      Medically Ready for Discharge: Anticipated in 2-4 Days      Watson Lopez MD  Hospitalist Service  [facility]  Securely message with [employer] (more info)  Text page via [facility] Paging/Directory   ______________________________________________________________________    Interval History   He feels better, tolerates clear liquid diet, has been ambulated in the hallway.  Pain is under control.    Physical Exam   Vital Signs: Temp: 97.8  F (36.6  C) Temp src: Oral BP: (!) 158/82 Pulse: 74   Resp: 16 SpO2: 99 % O2 Device: None (Room air)    Weight: 198 lbs 1.6 oz    Constitutional: awake, alert, cooperative, no apparent distress, and appears stated age  Respiratory: No increased work of breathing, good air exchange, clear to auscultation bilaterally, no crackles or wheezing  Cardiovascular: Normal apical impulse, regular rate and rhythm, normal S1 and S2, no S3 or S4, and no murmur noted  GI: Tender LLQ    Medical Decision Making       42 MINUTES SPENT BY ME on the date of service doing chart review, history, exam, documentation & further activities per the note.      Data     I have personally reviewed the following data over the past 24 hrs:    14.5 (H)  \   13.3   / 280     140 107 11.4 /  197 (H)   4.3 24 0.86 \       Imaging results reviewed over the past 24 hrs:   No results found for this or any previous visit (from the past 24 hours).  "

## 2025-07-02 NOTE — PROGRESS NOTES
"SPIRITUAL HEALTH SERVICES - Progress Note  RH MS 4  Referral Source/Reason for Visit: Emotional support for length of stay.    Summary and Recommendations -  Provided reflective listening as patient shared his hospitalization due to diverticulitis.     He reports he has been hospitalized before for diverticulitis. Patient shared due to his history, he \"knows what to expect\" and is hoping the antibiotics help and he can go home tomorrow.    Patient shared although he knows what to expect, \"the waiting is difficult.\"     Plan: Patient is [employer]; his belle is more personal and does not have any affiliation with a Yazidism. [facility] remains available upon request for any emotional or spiritual support.     STUART Musa   Intern    [facility] available 24/7 for emergent requests/referrals, either by paging the on-call  or by entering an ASAP/STAT consult in Epic (this will also page the on-call ).       Assessment    Saw pt Francisco Miguel per emotional support for length of stay.    Patient/Family Understanding of Illness and Goals of Care -   Patient shared he is currently hospitalized for diverticulitis. He reports he has been hospitalized before for diverticulitis.     Patient shared due to his history, he \"knows what to expect\" and is hoping the antibiotics help and he can go home tomorrow.    Distress and Loss -   Patient shared although he knows what to expect, \"the waiting is difficult.\"     Strengths, Coping, and Resources -   Patient shared having a good group of friends and local family as support while he is in the hospital and after, while he heals.     Meaning, Beliefs, and Spirituality -   Patient shared he was raised [employer], but is not affiliated with any Yazidism.     He considers himself [employer] and his belle is more personal.    He shared that his belle is \"being a good person to others around you.\"    "

## 2025-07-02 NOTE — PLAN OF CARE
"Goal Outcome Evaluation:      Plan of Care Reviewed With: patient      BP (!) 153/85 (BP Location: Right arm)   Pulse 74   Temp 97.7  F (36.5  C) (Oral)   Resp 16   Ht 1.727 m (5' 8\")   Wt 89.9 kg (198 lb 1.6 oz)   SpO2 94%   BMI 30.12 kg/m     Problem: Adult Inpatient Plan of Care  Goal: Plan of Care Review  Description: The Plan of Care Review/Shift note should be completed every shift.  The Outcome Evaluation is a brief statement about your assessment that the patient is improving, declining, or no change.  This information will be displayed automatically on your shift  note.  Outcome: Progressing  Flowsheets (Taken 7/2/2025 1045)  Outcome Evaluation: Pt A&O, SBA for mobility, denied pain Bowel sounds active, [id number]% of his meal, on continuing fluids, on tele monitor SR per telametry staff.  Plan of Care Reviewed With: patient  Goal: Patient-Specific Goal (Individualized)  Description: You can add care plan individualizations to a care plan. Examples of Individualization might be:  \"Parent requests to be called daily at 9am for status\", \"I have a hard time hearing out of my right ear\", or \"Do not touch me to wake me up as it startles  me\".  Outcome: Progressing  Goal: Absence of Hospital-Acquired Illness or Injury  Outcome: Progressing  Intervention: Identify and Manage Fall Risk  Recent Flowsheet Documentation  Taken 7/2/2025 1016 by Stephanie Torres RN  Safety Promotion/Fall Prevention:   clutter free environment maintained   nonskid shoes/slippers when out of bed   safety round/check completed  Intervention: Prevent Skin Injury  Recent Flowsheet Documentation  Taken 7/2/2025 1016 by Stephanie Torres RN  Body Position: position changed independently  Intervention: Prevent and Manage VTE (Venous Thromboembolism) Risk  Recent Flowsheet Documentation  Taken 7/2/2025 1016 by Stephanie Torres RN  VTE Prevention/Management: SCDs off (sequential compression devices)  Intervention: Prevent " Infection  Recent Flowsheet Documentation  Taken 7/2/2025 1016 by Stephanie Torres RN  Infection Prevention: hand hygiene promoted  Goal: Optimal Comfort and Wellbeing  Outcome: Progressing  Intervention: Provide Person-Centered Care  Recent Flowsheet Documentation  Taken 7/2/2025 1016 by Stephanie Torres RN  Trust Relationship/Rapport: care explained  Goal: Readiness for Transition of Care  Outcome: Progressing     Problem: Delirium  Goal: Optimal Coping  Outcome: Progressing  Intervention: Optimize Psychosocial Adjustment to Delirium  Recent Flowsheet Documentation  Taken 7/2/2025 1016 by Stephanie Torres RN  Family/Support System Care:   involvement promoted   support provided   self-care encouraged  Goal: Improved Behavioral Control  Outcome: Progressing  Intervention: Minimize Safety Risk  Recent Flowsheet Documentation  Taken 7/2/2025 1016 by Stephanie Torres RN  Enhanced Safety Measures: pain management  Trust Relationship/Rapport: care explained  Goal: Improved Attention and Thought Clarity  Outcome: Progressing  Goal: Improved Sleep  Outcome: Progressing  Intervention: Promote Sleep  Recent Flowsheet Documentation  Taken 7/2/2025 1016 by Stephanie Torres RN  Sleep/Rest Enhancement:   comfort measures   awakenings minimized     Problem: Intestinal Obstruction  Goal: Optimal Bowel Function  Outcome: Progressing  Intervention: Promote Bowel Function  Recent Flowsheet Documentation  Taken 7/2/2025 1016 by Stephanie Torres RN  Body Position: position changed independently  Head of Bed (HOB) Positioning: HOB at 20-30 degrees  Positioning/Transfer Devices:   pillows   in use  Goal: Fluid and Electrolyte Balance  Outcome: Progressing  Goal: Absence of Infection Signs and Symptoms  Outcome: Progressing  Goal: Optimize Nutrition Status  Outcome: Progressing  Goal: Optimal Pain Control and Function  Outcome: Progressing  Intervention: Prevent or Manage Pain  Recent Flowsheet Documentation  Taken  7/2/2025 1016 by Stephanie Torres RN  Sleep/Rest Enhancement:   comfort measures   awakenings minimized     Problem: Comorbidity Management  Goal: Blood Glucose Levels Within Targeted Range  Outcome: Progressing  Intervention: Monitor and Manage Glycemia  Recent Flowsheet Documentation  Taken 7/2/2025 1016 by Stephanie Torres RN  Medication Review/Management: medications reviewed       Outcome Evaluation: Pt A&O, SBA for mobility, denied pain Bowel sounds active, [id number]% of his meal, on continuing fluids, on tele monitor SR per telametry staff.

## 2025-07-02 NOTE — PROGRESS NOTES
Paged re throat tightness and subjective difficulty breathing.  Pt notes after getting IV antibiotic he feels as though his throat is tight and had difficulty swallowing.  He is afebrile and HDS.  He is not in distress.  His oropharynx is clear without uvular deviation but possible mild swelling of posterior oropharynx.  No hives noted.  No stridor noted.  He is moving air bilaterally.  Voice is without hoarseness.     He does have history of angioedema from cipro and flagyl.  He is not sure if he has ever received penicillins though on review of records he did receive zosyn during prior admissions.      I had patient look in mirror and he thinks maybe his lower lip is slightly swollen but not significantly so. Tongue does not seem swollen to him.      -Will give dose of IV steroids, IV benadryl.  Start Pepcid BID.    -Monitor closely on telemetry and continuous pulse oximetry.  Discussed with nursing.    -Discontinued zosyn and started meropenem.  Added zosyn to allergy list. Unclear as he had zosyn in past during prior admissions.    -NPO for now.     Addendum: Circled back, pt feels much better. No throat pain or difficulty swallowing.  No SOB.  Feels back to baseline.  His oropharynx has no swelling and uvula visible and at midline.      Hawk Tirado MD

## 2025-07-03 VITALS
TEMPERATURE: 98.1 F | DIASTOLIC BLOOD PRESSURE: 73 MMHG | BODY MASS INDEX: 30.02 KG/M2 | WEIGHT: 198.1 LBS | RESPIRATION RATE: 27 BRPM | HEIGHT: 68 IN | SYSTOLIC BLOOD PRESSURE: 149 MMHG | HEART RATE: 73 BPM | OXYGEN SATURATION: 96 %

## 2025-07-03 LAB
GLUCOSE BLDC GLUCOMTR-MCNC: 111 MG/DL (ref 70–99)
GLUCOSE BLDC GLUCOMTR-MCNC: 122 MG/DL (ref 70–99)
GLUCOSE BLDC GLUCOMTR-MCNC: 69 MG/DL (ref 70–99)
GLUCOSE BLDC GLUCOMTR-MCNC: 72 MG/DL (ref 70–99)
GLUCOSE BLDC GLUCOMTR-MCNC: 84 MG/DL (ref 70–99)
GLUCOSE BLDC GLUCOMTR-MCNC: 97 MG/DL (ref 70–99)

## 2025-07-03 PROCEDURE — 99232 SBSQ HOSP IP/OBS MODERATE 35: CPT | Performed by: HOSPITALIST

## 2025-07-03 PROCEDURE — 250N000013 HC RX MED GY IP 250 OP 250 PS 637: Performed by: INTERNAL MEDICINE

## 2025-07-03 PROCEDURE — 250N000013 HC RX MED GY IP 250 OP 250 PS 637: Performed by: PHYSICIAN ASSISTANT

## 2025-07-03 PROCEDURE — 250N000011 HC RX IP 250 OP 636: Performed by: HOSPITALIST

## 2025-07-03 PROCEDURE — 258N000003 HC RX IP 258 OP 636: Performed by: PHYSICIAN ASSISTANT

## 2025-07-03 PROCEDURE — 120N000001 HC R&B MED SURG/OB

## 2025-07-03 RX ORDER — CALCIUM CARBONATE 500 MG/1
1000 TABLET, CHEWABLE ORAL 3 TIMES DAILY PRN
Status: DISPENSED | OUTPATIENT
Start: 2025-07-03

## 2025-07-03 RX ADMIN — ACETAMINOPHEN 975 MG: 325 TABLET ORAL at 23:20

## 2025-07-03 RX ADMIN — FINASTERIDE 5 MG: 5 TABLET, FILM COATED ORAL at 09:01

## 2025-07-03 RX ADMIN — SODIUM CHLORIDE, SODIUM LACTATE, POTASSIUM CHLORIDE, AND CALCIUM CHLORIDE: .6; .31; .03; .02 INJECTION, SOLUTION INTRAVENOUS at 13:34

## 2025-07-03 RX ADMIN — ATORVASTATIN CALCIUM 20 MG: 20 TABLET, FILM COATED ORAL at 09:01

## 2025-07-03 RX ADMIN — MEROPENEM 1 G: 1 INJECTION, POWDER, FOR SOLUTION INTRAVENOUS at 22:31

## 2025-07-03 RX ADMIN — ESCITALOPRAM OXALATE 5 MG: 5 TABLET, FILM COATED ORAL at 09:01

## 2025-07-03 RX ADMIN — CALCIUM CARBONATE (ANTACID) CHEW TAB 500 MG 1000 MG: 500 CHEW TAB at 23:20

## 2025-07-03 RX ADMIN — MEROPENEM 1 G: 1 INJECTION, POWDER, FOR SOLUTION INTRAVENOUS at 05:53

## 2025-07-03 RX ADMIN — MEROPENEM 1 G: 1 INJECTION, POWDER, FOR SOLUTION INTRAVENOUS at 15:35

## 2025-07-03 ASSESSMENT — ACTIVITIES OF DAILY LIVING (ADL)
ADLS_ACUITY_SCORE: 34
ADLS_ACUITY_SCORE: 26
ADLS_ACUITY_SCORE: 34
ADLS_ACUITY_SCORE: 26
ADLS_ACUITY_SCORE: 34
ADLS_ACUITY_SCORE: 26
ADLS_ACUITY_SCORE: 34

## 2025-07-03 NOTE — PLAN OF CARE
"Goal Outcome Evaluation:      Plan of Care Reviewed With: patient    Overall Patient Progress: improvingOverall Patient Progress: improving    Outcome Evaluation: Patient slept between cares. Denies pain. Denies n/v/d. Afebrile. RA. Tolerating FL diet. Tele SR 70s.     /68 (BP Location: Left arm)   Pulse 69   Temp 97.7  F (36.5  C) (Oral)   Resp 12   Ht 1.727 m (5' 8\")   Wt 89.9 kg (198 lb 1.6 oz)   SpO2 95%   BMI 30.12 kg/m      Problem: Adult Inpatient Plan of Care  Goal: Plan of Care Review  Description: The Plan of Care Review/Shift note should be completed every shift.  The Outcome Evaluation is a brief statement about your assessment that the patient is improving, declining, or no change.  This information will be displayed automatically on your shift  note.  Outcome: Progressing  Flowsheets (Taken 7/3/2025 5301)  Outcome Evaluation: Patient slept between cares. Denies pain. Denies n/v/d. Afebrile. RA. Tolerating FL diet.  Plan of Care Reviewed With: patient  Overall Patient Progress: improving  Goal: Patient-Specific Goal (Individualized)  Description: You can add care plan individualizations to a care plan. Examples of Individualization might be:  \"Parent requests to be called daily at 9am for status\", \"I have a hard time hearing out of my right ear\", or \"Do not touch me to wake me up as it startles  me\".  Outcome: Progressing  Goal: Absence of Hospital-Acquired Illness or Injury  Outcome: Progressing  Intervention: Identify and Manage Fall Risk  Recent Flowsheet Documentation  Taken 7/2/2025 2100 by Maddie Masters, RN  Safety Promotion/Fall Prevention:   clutter free environment maintained   nonskid shoes/slippers when out of bed   safety round/check completed  Intervention: Prevent Skin Injury  Recent Flowsheet Documentation  Taken 7/2/2025 2100 by Maddie Masters, RN  Body Position: position changed independently  Intervention: Prevent and Manage VTE (Venous Thromboembolism) Risk  Recent " Flowsheet Documentation  Taken 7/2/2025 2100 by Maddie Masters RN  VTE Prevention/Management: SCDs off (sequential compression devices)  Intervention: Prevent Infection  Recent Flowsheet Documentation  Taken 7/2/2025 2100 by Maddie Masters RN  Infection Prevention: hand hygiene promoted  Goal: Optimal Comfort and Wellbeing  Outcome: Progressing  Intervention: Provide Person-Centered Care  Recent Flowsheet Documentation  Taken 7/2/2025 2100 by Maddie Masters RN  Trust Relationship/Rapport: care explained  Goal: Readiness for Transition of Care  Outcome: Progressing     Problem: Delirium  Goal: Optimal Coping  Outcome: Progressing  Intervention: Optimize Psychosocial Adjustment to Delirium  Recent Flowsheet Documentation  Taken 7/2/2025 2100 by Maddie Masters RN  Family/Support System Care:   involvement promoted   support provided   self-care encouraged  Goal: Improved Behavioral Control  Outcome: Progressing  Intervention: Minimize Safety Risk  Recent Flowsheet Documentation  Taken 7/2/2025 2100 by Maddie Masters RN  Enhanced Safety Measures: pain management  Trust Relationship/Rapport: care explained  Goal: Improved Attention and Thought Clarity  Outcome: Progressing  Goal: Improved Sleep  Outcome: Progressing  Intervention: Promote Sleep  Recent Flowsheet Documentation  Taken 7/2/2025 2100 by Maddie Masters RN  Sleep/Rest Enhancement:   comfort measures   awakenings minimized     Problem: Intestinal Obstruction  Goal: Optimal Bowel Function  Outcome: Progressing  Intervention: Promote Bowel Function  Recent Flowsheet Documentation  Taken 7/2/2025 2100 by Maddie Masters RN  Body Position: position changed independently  Head of Bed (HOB) Positioning: HOB at 20-30 degrees  Positioning/Transfer Devices:   pillows   in use  Goal: Fluid and Electrolyte Balance  Outcome: Progressing  Goal: Absence of Infection Signs and Symptoms  Outcome: Progressing  Goal: Optimize Nutrition Status  Outcome: Progressing  Goal:  Optimal Pain Control and Function  Outcome: Progressing  Intervention: Prevent or Manage Pain  Recent Flowsheet Documentation  Taken 7/2/2025 2100 by Maddie Masters, RN  Sleep/Rest Enhancement:   comfort measures   awakenings minimized     Problem: Comorbidity Management  Goal: Blood Glucose Levels Within Targeted Range  Outcome: Progressing  Intervention: Monitor and Manage Glycemia  Recent Flowsheet Documentation  Taken 7/2/2025 2100 by Maddie Masters, RN  Medication Review/Management: medications reviewed

## 2025-07-03 NOTE — PROGRESS NOTES
COLON [facility] RECTAL [facility]GERY  PROGRESS NOTE    July 3, 2025    SUBJECTIVE:    - No acute events overnight  - Reports feeling better overall, minimal abdominal pain   - Tolerated liquids without nausea or emesis  - Passing some gas, no bowel movements     OBJECTIVE:  Temp:  [97.7  F (36.5  C)-98.3  F (36.8  C)] 98.2  F (36.8  C)  Pulse:  [53-77] 53  Resp:  [12-34] 16  BP: (127-159)/(68-90) 130/69  SpO2:  [94 %-99 %] 94 %    Intake/Output Summary (Last 24 hours) at 7/3/2025 0759  Last data filed at 7/2/2025 1800  Gross per 24 hour   Intake 1440 ml   Output --   Net 1440 ml       GENERAL:  Awake, alert, no acute distress  EXTREMITIES: Warm and well perfused  ABDOMEN:  Soft, tender LLQ, slightly distended. No guarding, rigidity, or peritoneal signs.     LABS:  Lab Results   Component Value Date    WBC 14.5 07/02/2025    WBC 8.8 01/27/2019     Lab Results   Component Value Date    HGB 13.3 07/02/2025    HGB 14.3 01/27/2019     Lab Results   Component Value Date    HCT 39.7 07/02/2025    HCT 44.1 01/27/2019     Lab Results   Component Value Date     07/02/2025     01/27/2019     Last Basic Metabolic Panel:  Lab Results   Component Value Date     07/02/2025     01/27/2019      Lab Results   Component Value Date    POTASSIUM 4.3 07/02/2025    POTASSIUM 4.1 01/27/2019     Lab Results   Component Value Date    CHLORIDE 107 07/02/2025    CHLORIDE 108 01/27/2019     Lab Results   Component Value Date    JOSE 8.9 07/02/2025    JOSE 9.1 01/27/2019     Lab Results   Component Value Date    CO2 24 07/02/2025    CO2 26 01/27/2019     Lab Results   Component Value Date    BUN 11.4 07/02/2025    BUN 15 01/27/2019     Lab Results   Component Value Date    CR 0.86 07/02/2025    CR 0.88 01/27/2019     Lab Results   Component Value Date     07/03/2025    GLC 96 01/27/2019       ASSESSMENT/PLAN: Francisco Miguel is a 71 yo M who presents for evaluation of diverticulitis w/ abscess. He had worsening pain and presented to  the urgent care in [place]. WBC 16.2, hgb 13.6. CT abd/pelvis w/ showed acute proximal sigmoid colon diverticulitis with associated 1.9 x 3.5 x 3.4 cm intramural abscess versus focal contained perforation.     No acute surgical intervention warranted   Follow up AM labs, ensure WBC down trending   If WBC is improved, can advance to low fiber diet  Continue IV antibiotics   Hold bowel regimen, avoid Senna-Docusate in diverticulitis patients   Wants to follow up with colorectal surgeon at [facility]     We will continue to follow     For questions/paging, please contact the CRS office at [phone].      [facility]  [address] [address]  T: [phone]  F: [phone]

## 2025-07-03 NOTE — PROGRESS NOTES
[facility]    Medicine Progress Note - Hospitalist Service    Date of Admission:  7/1/2025    Assessment & Plan      Francisco Miguel is a 70 year old male admitted on 7/1/2025. He has a PMH significant for prediabetes, BPH, HLD,TIA previous episodes of diverticulitis (last admission 8/2023) who presents to the ED from  with another bout of diverticulitis with microperforation.   States that he started having pain yesterday, went to  today and Ct shows acute right sided diverticulitis with intramural abscess vs contained perforation. He has been seen by Dr. Lane in the past and his last C-scope was in 8/23 showing stable diverticula.     Lab work at  ([facility]) shows WBC of 16K but normal BMP. CT A/P: shows acute proximal sigmoid colon diverticulitis with associated 1.9 x 3.5 x 3.4 cm intramural abscess versus focal contained perforation. She was started on IV zosyn and recommended for admission.     #Acute complicated diverticulitis with intramural abscess vs microperf  - Clinically looks stable, not toxic  - CRS consult as this is likely the 3/4th episode of diverticulitis   - CLD for now   - Pain control  - Ertapenem after reaction last night considered allergy to Zosyn  (He does have history of angioedema from cipro and flagyl.  He is not sure if he has ever received penicillins though on review of records he did receive zosyn during prior admissions. Dr Tirado)   Pharmacy consulted for possible outpatient therapy. CRS is suggesting Augmentin.  Will do PO trial  before discharge tomorrow .    #Depression  - Resume Lexapro     #DM   - hold metformin for now   - Control with SSi for now            # Drug Induced Platelet Defect: home medication list includes an antiplatelet medication        Diet: Low Fiber Diet    DVT Prophylaxis: Low Risk/Ambulatory with no VTE prophylaxis indicated  Vitale Catheter: Not present  Lines: None     Cardiac Monitoring: ACTIVE order. Indication:  "Tachyarrhythmias, acute (48 hours)  Code Status: Full Code      Clinically Significant Risk Factors                              # Obesity: Estimated body mass index is 30.12 kg/m  as calculated from the following:    Height as of this encounter: 1.727 m (5' 8\").    Weight as of this encounter: 89.9 kg (198 lb 1.6 oz)., PRESENT ON ADMISSION            Social Drivers of Health    Tobacco Use: Medium Risk (6/27/2025)    Received from [facility]    Patient History     Smoking Tobacco Use: Former     Smokeless Tobacco Use: Never          Disposition Plan      Medically Ready for Discharge: Anticipated in 2-4 Days      Watson Lopez MD  Hospitalist Service  [facility]  Securely message with [employer] (more info)  Text page via [employer] Paging/Directory   ______________________________________________________________________    Interval History   He is better, tolerates clear liquid diet, ambulates.  Pain is under control.    Physical Exam   Vital Signs: Temp: 98.3  F (36.8  C) Temp src: Oral BP: 136/74 Pulse: 64   Resp: 20 SpO2: 97 % O2 Device: None (Room air)    Weight: 198 lbs 1.6 oz    Constitutional: awake, alert, cooperative, no apparent distress, and appears stated age  Respiratory: No increased work of breathing, good air exchange, clear to auscultation bilaterally, no crackles or wheezing  Cardiovascular: Normal apical impulse, regular rate and rhythm, normal S1 and S2, no S3 or S4, and no murmur noted  GI: Tender LLQ    Medical Decision Making       39 MINUTES SPENT BY ME on the date of service doing chart review, history, exam, documentation & further activities per the note.      Data         Imaging results reviewed over the past 24 hrs:   No results found for this or any previous visit (from the past 24 hours).  "

## 2025-07-03 NOTE — PLAN OF CARE
"No reaction to meropenem noted during shift, BG WNL says he is pre-diabetic, tolerated low fiber well, denies pain, up ad edy. SR on telemetry.   Problem: Adult Inpatient Plan of Care  Goal: Plan of Care Review  Description: The Plan of Care Review/Shift note should be completed every shift.  The Outcome Evaluation is a brief statement about your assessment that the patient is improving, declining, or no change.  This information will be displayed automatically on your shift  note.  Outcome: Progressing  Flowsheets (Taken 7/3/2025 1744)  Plan of Care Reviewed With: patient  Goal: Patient-Specific Goal (Individualized)  Description: You can add care plan individualizations to a care plan. Examples of Individualization might be:  \"Parent requests to be called daily at 9am for status\", \"I have a hard time hearing out of my right ear\", or \"Do not touch me to wake me up as it startles  me\".  Outcome: Progressing  Goal: Absence of Hospital-Acquired Illness or Injury  Outcome: Progressing  Intervention: Identify and Manage Fall Risk  Recent Flowsheet Documentation  Taken 7/3/2025 0900 by Sulaiman Ramirez RN  Safety Promotion/Fall Prevention:   clutter free environment maintained   increased rounding and observation   increase visualization of patient   nonskid shoes/slippers when out of bed   patient and family education   room near nurse's station   room organization consistent   safety round/check completed   treat reversible contributory factors  Intervention: Prevent Skin Injury  Recent Flowsheet Documentation  Taken 7/3/2025 0900 by Sulaiman Ramirez RN  Body Position: position changed independently  Intervention: Prevent and Manage VTE (Venous Thromboembolism) Risk  Recent Flowsheet Documentation  Taken 7/3/2025 0900 by Sulaiman Ramirez RN  VTE Prevention/Management: patient refused intervention  Intervention: Prevent Infection  Recent Flowsheet Documentation  Taken 7/3/2025 0900 by Sulaiman Ramirez" RN  Infection Prevention: hand hygiene promoted  Goal: Optimal Comfort and Wellbeing  Outcome: Progressing  Intervention: Provide Person-Centered Care  Recent Flowsheet Documentation  Taken 7/3/2025 0900 by Sulaiman Ramirez RN  Trust Relationship/Rapport:   care explained   questions encouraged   thoughts/feelings acknowledged  Goal: Readiness for Transition of Care  Outcome: Progressing  Flowsheets (Taken 7/3/2025 1744)  Concerns to be Addressed: adjustment to diagnosis/illness  Barriers to Discharge: prognosis  Intervention: Mutually Develop Transition Plan  Recent Flowsheet Documentation  Taken 7/3/2025 1744 by Sulaiman Ramirez RN  Concerns to be Addressed: adjustment to diagnosis/illness     Problem: Delirium  Goal: Optimal Coping  Outcome: Progressing  Intervention: Optimize Psychosocial Adjustment to Delirium  Recent Flowsheet Documentation  Taken 7/3/2025 0900 by Sulaiman Ramirez RN  Family/Support System Care:   involvement promoted   self-care encouraged  Goal: Improved Behavioral Control  Outcome: Progressing  Intervention: Minimize Safety Risk  Recent Flowsheet Documentation  Taken 7/3/2025 0900 by Sulaiman Ramirez RN  Trust Relationship/Rapport:   care explained   questions encouraged   thoughts/feelings acknowledged  Goal: Improved Attention and Thought Clarity  Outcome: Progressing  Goal: Improved Sleep  Outcome: Progressing     Problem: Intestinal Obstruction  Goal: Optimal Bowel Function  Outcome: Progressing  Intervention: Promote Bowel Function  Recent Flowsheet Documentation  Taken 7/3/2025 0900 by Sulaiman Ramirez RN  Body Position: position changed independently  Head of Bed (HOB) Positioning: HOB at 20-30 degrees  Positioning/Transfer Devices: pillows  Goal: Fluid and Electrolyte Balance  Outcome: Progressing  Goal: Absence of Infection Signs and Symptoms  Outcome: Progressing  Goal: Optimize Nutrition Status  Outcome: Progressing  Goal: Optimal Pain Control and Function  Outcome:  Progressing     Problem: Comorbidity Management  Goal: Blood Glucose Levels Within Targeted Range  Outcome: Progressing  Intervention: Monitor and Manage Glycemia  Recent Flowsheet Documentation  Taken 7/3/2025 0900 by Sulaiman Ramirez, RN  Medication Review/Management: medications reviewed   Goal Outcome Evaluation:      Plan of Care Reviewed With: patient

## 2025-07-04 VITALS
DIASTOLIC BLOOD PRESSURE: 68 MMHG | WEIGHT: 198.1 LBS | RESPIRATION RATE: 18 BRPM | TEMPERATURE: 97.7 F | SYSTOLIC BLOOD PRESSURE: 130 MMHG | HEIGHT: 68 IN | HEART RATE: 78 BPM | BODY MASS INDEX: 30.02 KG/M2 | OXYGEN SATURATION: 97 %

## 2025-07-04 LAB
ANION GAP SERPL CALCULATED.3IONS-SCNC: 12 MMOL/L (ref 7–15)
BASOPHILS # BLD AUTO: 0 10E3/UL (ref 0–0.2)
BASOPHILS NFR BLD AUTO: 0 %
BUN SERPL-MCNC: 12.2 MG/DL (ref 8–23)
CALCIUM SERPL-MCNC: 8.8 MG/DL (ref 8.8–10.4)
CHLORIDE SERPL-SCNC: 108 MMOL/L (ref 98–107)
CREAT SERPL-MCNC: 0.77 MG/DL (ref 0.67–1.17)
CRP SERPL-MCNC: 62.77 MG/L
EGFRCR SERPLBLD CKD-EPI 2021: >90 ML/MIN/1.73M2
ELLIPTOCYTES BLD QL SMEAR: SLIGHT
EOSINOPHIL # BLD AUTO: 0.2 10E3/UL (ref 0–0.7)
EOSINOPHIL NFR BLD AUTO: 2 %
ERYTHROCYTE [DISTWIDTH] IN BLOOD BY AUTOMATED COUNT: 13.9 % (ref 10–15)
GLUCOSE BLDC GLUCOMTR-MCNC: 127 MG/DL (ref 70–99)
GLUCOSE BLDC GLUCOMTR-MCNC: 84 MG/DL (ref 70–99)
GLUCOSE SERPL-MCNC: 87 MG/DL (ref 70–99)
HCO3 SERPL-SCNC: 24 MMOL/L (ref 22–29)
HCT VFR BLD AUTO: 39.8 % (ref 40–53)
HGB BLD-MCNC: 13.4 G/DL (ref 13.3–17.7)
IMM GRANULOCYTES # BLD: 0.1 10E3/UL
IMM GRANULOCYTES NFR BLD: 1 %
LYMPHOCYTES # BLD AUTO: 2.2 10E3/UL (ref 0.8–5.3)
LYMPHOCYTES NFR BLD AUTO: 23 %
MCH RBC QN AUTO: 30.2 PG (ref 26.5–33)
MCHC RBC AUTO-ENTMCNC: 33.7 G/DL (ref 31.5–36.5)
MCV RBC AUTO: 90 FL (ref 78–100)
MONOCYTES # BLD AUTO: 0.9 10E3/UL (ref 0–1.3)
MONOCYTES NFR BLD AUTO: 10 %
NEUTROPHILS # BLD AUTO: 6.2 10E3/UL (ref 1.6–8.3)
NEUTROPHILS NFR BLD AUTO: 65 %
NRBC # BLD AUTO: 0 10E3/UL
NRBC BLD AUTO-RTO: 0 /100
PLAT MORPH BLD: ABNORMAL
PLATELET # BLD AUTO: 226 10E3/UL (ref 150–450)
POTASSIUM SERPL-SCNC: 4.4 MMOL/L (ref 3.4–5.3)
RBC # BLD AUTO: 4.43 10E6/UL (ref 4.4–5.9)
RBC MORPH BLD: ABNORMAL
SODIUM SERPL-SCNC: 144 MMOL/L (ref 135–145)
WBC # BLD AUTO: 9.6 10E3/UL (ref 4–11)

## 2025-07-04 PROCEDURE — 258N000003 HC RX IP 258 OP 636: Performed by: PHYSICIAN ASSISTANT

## 2025-07-04 PROCEDURE — 250N000013 HC RX MED GY IP 250 OP 250 PS 637: Performed by: HOSPITALIST

## 2025-07-04 PROCEDURE — 250N000013 HC RX MED GY IP 250 OP 250 PS 637: Performed by: PHYSICIAN ASSISTANT

## 2025-07-04 PROCEDURE — 36415 COLL VENOUS BLD VENIPUNCTURE: CPT | Performed by: HOSPITALIST

## 2025-07-04 PROCEDURE — 80048 BASIC METABOLIC PNL TOTAL CA: CPT | Performed by: HOSPITALIST

## 2025-07-04 PROCEDURE — 250N000011 HC RX IP 250 OP 636: Performed by: HOSPITALIST

## 2025-07-04 PROCEDURE — 86140 C-REACTIVE PROTEIN: CPT | Performed by: HOSPITALIST

## 2025-07-04 PROCEDURE — 85004 AUTOMATED DIFF WBC COUNT: CPT | Performed by: HOSPITALIST

## 2025-07-04 PROCEDURE — 99239 HOSP IP/OBS DSCHRG MGMT >30: CPT | Performed by: HOSPITALIST

## 2025-07-04 RX ORDER — DIPHENHYDRAMINE HYDROCHLORIDE 50 MG/ML
25 INJECTION, SOLUTION INTRAMUSCULAR; INTRAVENOUS
Status: DISCONTINUED | OUTPATIENT
Start: 2025-07-04 | End: 2025-07-04 | Stop reason: HOSPADM

## 2025-07-04 RX ORDER — METHYLPREDNISOLONE SODIUM SUCCINATE 40 MG/ML
40 INJECTION INTRAMUSCULAR; INTRAVENOUS
Status: DISCONTINUED | OUTPATIENT
Start: 2025-07-04 | End: 2025-07-04 | Stop reason: HOSPADM

## 2025-07-04 RX ORDER — DIPHENHYDRAMINE HYDROCHLORIDE 50 MG/ML
50 INJECTION, SOLUTION INTRAMUSCULAR; INTRAVENOUS
Status: DISCONTINUED | OUTPATIENT
Start: 2025-07-04 | End: 2025-07-04 | Stop reason: HOSPADM

## 2025-07-04 RX ADMIN — FINASTERIDE 5 MG: 5 TABLET, FILM COATED ORAL at 08:38

## 2025-07-04 RX ADMIN — MEROPENEM 1 G: 1 INJECTION, POWDER, FOR SOLUTION INTRAVENOUS at 06:17

## 2025-07-04 RX ADMIN — ESCITALOPRAM OXALATE 5 MG: 5 TABLET, FILM COATED ORAL at 08:38

## 2025-07-04 RX ADMIN — ATORVASTATIN CALCIUM 20 MG: 20 TABLET, FILM COATED ORAL at 08:38

## 2025-07-04 RX ADMIN — SODIUM CHLORIDE, SODIUM LACTATE, POTASSIUM CHLORIDE, AND CALCIUM CHLORIDE: .6; .31; .03; .02 INJECTION, SOLUTION INTRAVENOUS at 01:16

## 2025-07-04 RX ADMIN — AMOXICILLIN AND CLAVULANATE POTASSIUM 1 TABLET: 875; 125 TABLET, FILM COATED ORAL at 10:15

## 2025-07-04 ASSESSMENT — ACTIVITIES OF DAILY LIVING (ADL)
ADLS_ACUITY_SCORE: 34

## 2025-07-04 NOTE — PLAN OF CARE
"A/o x4. IV removed. Discharge instructions gone over and understanding verbalized. Meds to be picked up from preferred pharmacy. All belongings gathered and sent with patient. Wife to transport home.         Problem: Adult Inpatient Plan of Care  Goal: Plan of Care Review  Description: The Plan of Care Review/Shift note should be completed every shift.  The Outcome Evaluation is a brief statement about your assessment that the patient is improving, declining, or no change.  This information will be displayed automatically on your shift  note.  Outcome: Met  Flowsheets (Taken 7/4/2025 1327)  Outcome Evaluation: discharging home  Plan of Care Reviewed With: patient  Overall Patient Progress: improving  Goal: Patient-Specific Goal (Individualized)  Description: You can add care plan individualizations to a care plan. Examples of Individualization might be:  \"Parent requests to be called daily at 9am for status\", \"I have a hard time hearing out of my right ear\", or \"Do not touch me to wake me up as it startles  me\".  Outcome: Met  Goal: Absence of Hospital-Acquired Illness or Injury  Outcome: Met  Intervention: Identify and Manage Fall Risk  Recent Flowsheet Documentation  Taken 7/4/2025 0838 by Emma Elizondo RN  Safety Promotion/Fall Prevention:   assistive device/personal items within reach   clutter free environment maintained   nonskid shoes/slippers when out of bed   safety round/check completed  Intervention: Prevent Skin Injury  Recent Flowsheet Documentation  Taken 7/4/2025 0838 by Emma Elizondo RN  Body Position: position changed independently  Intervention: Prevent and Manage VTE (Venous Thromboembolism) Risk  Recent Flowsheet Documentation  Taken 7/4/2025 0838 by Emma Elizondo RN  VTE Prevention/Management: SCDs off (sequential compression devices)  Intervention: Prevent Infection  Recent Flowsheet Documentation  Taken 7/4/2025 0838 by Emma Elizondo RN  Infection Prevention:   rest/sleep promoted   " single patient room provided  Goal: Optimal Comfort and Wellbeing  Outcome: Met  Goal: Readiness for Transition of Care  Outcome: Met     Problem: Delirium  Goal: Optimal Coping  Outcome: Met  Goal: Improved Behavioral Control  Outcome: Met  Intervention: Minimize Safety Risk  Recent Flowsheet Documentation  Taken 7/4/2025 0838 by Emma Elizondo RN  Enhanced Safety Measures: pain management  Goal: Improved Attention and Thought Clarity  Outcome: Met  Goal: Improved Sleep  Outcome: Met     Problem: Intestinal Obstruction  Goal: Optimal Bowel Function  Outcome: Met  Intervention: Promote Bowel Function  Recent Flowsheet Documentation  Taken 7/4/2025 0838 by Emma Elizondo RN  Body Position: position changed independently  Head of Bed (HOB) Positioning: HOB at 20-30 degrees  Positioning/Transfer Devices:   pillows   in use  Goal: Fluid and Electrolyte Balance  Outcome: Met  Goal: Absence of Infection Signs and Symptoms  Outcome: Met  Goal: Optimize Nutrition Status  Outcome: Met  Goal: Optimal Pain Control and Function  Outcome: Met     Problem: Comorbidity Management  Goal: Blood Glucose Levels Within Targeted Range  Outcome: Met  Intervention: Monitor and Manage Glycemia  Recent Flowsheet Documentation  Taken 7/4/2025 0838 by Emma Elizondo RN  Medication Review/Management: medications reviewed         Goal Outcome Evaluation:      Plan of Care Reviewed With: patient    Overall Patient Progress: improvingOverall Patient Progress: improving    Outcome Evaluation: discharging home

## 2025-07-04 NOTE — PLAN OF CARE
"CARE FROM [id number]    BP (!) 149/73 (BP Location: Right arm)   Pulse 73   Temp 98.1  F (36.7  C) (Oral)   Resp 27   Ht 1.727 m (5' 8\")   Wt 89.9 kg (198 lb 1.6 oz)   SpO2 96%   BMI 30.12 kg/m      Goal Outcome Evaluation:      Plan of Care Reviewed With: patient    Overall Patient Progress: improvingOverall Patient Progress: improving    Outcome Evaluation: A&O x4. VSS, RA. Tele SR. Up ad edy. Tyl given for HA, and tums for heartburn-- effective per pt. Rating abd discomfort 2/10-- tolerable at rest. Low fiber diet.  ml/hr, IV merrem q8h. New PIV to L hand. CRS following, probable discharge today.    Problem: Adult Inpatient Plan of Care  Goal: Plan of Care Review  Description: The Plan of Care Review/Shift note should be completed every shift.  The Outcome Evaluation is a brief statement about your assessment that the patient is improving, declining, or no change.  This information will be displayed automatically on your shift  note.  Outcome: Progressing  Flowsheets (Taken 7/4/2025 0233)  Outcome Evaluation: A&O x4. VSS, RA. Tele SR. Up ad edy. Tyl given for HA, and tums for heartburn-- effective per pt. Rating abd discomfort 2/10-- tolerable at rest. Low fiber diet.  ml/hr, IV merrem q8h. New PIV to L hand. CRS following, probable discharge today.  Plan of Care Reviewed With: patient  Overall Patient Progress: improving  Goal: Patient-Specific Goal (Individualized)  Description: You can add care plan individualizations to a care plan. Examples of Individualization might be:  \"Parent requests to be called daily at 9am for status\", \"I have a hard time hearing out of my right ear\", or \"Do not touch me to wake me up as it startles  me\".  Outcome: Progressing  Goal: Absence of Hospital-Acquired Illness or Injury  Outcome: Progressing  Intervention: Identify and Manage Fall Risk  Recent Flowsheet Documentation  Taken 7/4/2025 0115 by Deirdre Mcdaniels RN  Safety Promotion/Fall Prevention:   " clutter free environment maintained   patient and family education   safety round/check completed   room near nurse's station  Taken 7/3/2025 2006 by Deirdre Mcdaniels, RN  Safety Promotion/Fall Prevention:   activity supervised   clutter free environment maintained   patient and family education   safety round/check completed  Intervention: Prevent Infection  Recent Flowsheet Documentation  Taken 7/4/2025 0115 by Deirdre Mcdaniels, RN  Infection Prevention:   rest/sleep promoted   single patient room provided  Taken 7/3/2025 2006 by Deirdre Mcdaniels, RN  Infection Prevention:   rest/sleep promoted   single patient room provided  Goal: Optimal Comfort and Wellbeing  Outcome: Progressing  Intervention: Monitor Pain and Promote Comfort  Recent Flowsheet Documentation  Taken 7/3/2025 2005 by Deirdre Mcdaniels, RN  Pain Management Interventions: declines  Goal: Readiness for Transition of Care  Outcome: Progressing

## 2025-07-04 NOTE — PROGRESS NOTES
COLON & RECTAL SURGERY  PROGRESS NOTE    July 4, 2025    SUBJECTIVE:    - No acute events overnight  - Minimal abdominal pain  - Tolerating diet without nausea or emesis  - WBC normalized, on oral abx    OBJECTIVE:  Temp:  [97.7  F (36.5  C)-98.3  F (36.8  C)] 97.7  F (36.5  C)  Pulse:  [64-78] 78  Resp:  [18-27] 18  BP: (130-149)/(68-74) 130/68  SpO2:  [96 %-97 %] 97 %    No intake or output data in the 24 hours ending 07/04/25 1034    GENERAL:  Awake, alert, no acute distress  EXTREMITIES: Warm and well perfused  ABDOMEN:  Soft, not tender, not distended. No guarding, rigidity, or peritoneal signs.     LABS: Reviewed      ASSESSMENT/PLAN: Francisco Miguel is a 69 yo M who presents for evaluation of diverticulitis w/ abscess. He had worsening pain and presented to the urgent care in [place]. WBC 16.2, hgb 13.6. CT abd/pelvis w/ showed acute proximal sigmoid colon diverticulitis with associated 1.9 x 3.5 x 3.4 cm intramural abscess versus focal contained perforation.     No acute surgical intervention warranted   2.  OK for discharge from a CRS standpoint  3.  Recommend low fiber diet and 10 day course of oral antibiotics   5. Wishes to follow up with CRS at [facility] - also recommend follow up CT in 2 weeks to ensure abscess resolution     For questions/paging, please contact the CRS office at [phone].      [facility]  [address]  [place] [address]  T: [phone]  F: [phone]

## 2025-07-04 NOTE — DISCHARGE SUMMARY
"[facility]  Hospitalist Discharge Summary      Date of Admission:  7/1/2025  Date of Discharge:  7/4/2025  Discharging Provider: Watson Lopez MD  Discharge Service: Hospitalist Service    Discharge Diagnoses   Acute complicated diverticulitis with intramural abscess vs microperforation  Depression  DM   Drug Induced Platelet Defect    Clinically Significant Risk Factors     # Obesity: Estimated body mass index is 30.12 kg/m  as calculated from the following:    Height as of this encounter: 1.727 m (5' 8\").    Weight as of this encounter: 89.9 kg (198 lb 1.6 oz).       Follow-ups Needed After Discharge   Additional follow-up instructions/to-do's for PCP    :   Unresulted Labs Ordered in the Past 30 Days of this Admission       No orders found from 6/1/2025 to 7/2/2025.        These results will be followed up by      Discharge Disposition   Discharged to home  Condition at discharge: Stable    Hospital Course   Francisco Miguel is a 70 year old male admitted on 7/1/2025. He has a PMH significant for prediabetes, BPH, HLD,TIA previous episodes of diverticulitis (last admission 8/2023) who presents to the ED from  with another bout of diverticulitis with microperforation.   States that he started having pain yesterday, went to  today and Ct shows acute right sided diverticulitis with intramural abscess vs contained perforation. He has been seen by Dr. Lane in the past and his last C-scope was in 8/23 showing stable diverticula.     Lab work at  (Care Everywhere) shows WBC of 16K but normal BMP. CT A/P: shows acute proximal sigmoid colon diverticulitis with associated 1.9 x 3.5 x 3.4 cm intramural abscess versus focal contained perforation. She was started on IV zosyn and recommended for admission.     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  He is not sure if he has ever received penicillins though on review of records he did receive zosyn during prior admissions. Dr Tirado)   Pharmacy consulted for possible outpatient therapy. CRS is suggesting Augmentin.  Will do PO trial  before discharge tomorrow .    #Depression  - Resume Lexapro     #DM   - hold metformin for now   - Control with SSi for now            # Drug Induced Platelet Defect: home medication list includes an antiplatelet medication        Diet: Low Fiber Diet    DVT Prophylaxis: Low Risk/Ambulatory with no VTE prophylaxis indicated  Vitale Catheter: Not present  Lines: None     Cardiac Monitoring: ACTIVE order. Indication: Tachyarrhythmias, acute (48 hours)  Code Status: Full Code      Consultations This Hospital Stay   COLORECTAL SURGERY IP CONSULT  PHARMACY IP CONSULT    Code Status   Full Code    Time Spent on this Encounter   I, Watson Lopez MD, personally saw the patient today and spent greater than 30 minutes discharging this patient.       Watson Lopez MD  [facility]  [address] [place] [address]  Phone: [phone]  Fax: [phone]  ______________________________________________________________________    Physical Exam   Vital Signs: Temp: 97.7  F (36.5  C) Temp src: Oral BP: 130/68 Pulse: 78   Resp: 18 SpO2: 97 % O2 Device: None (Room air)    Weight: 198 lbs 1.6 oz  Constitutional: awake, alert, cooperative, no apparent distress, and appears stated age  Cardiovascular: Normal apical impulse, regular rate and rhythm, normal S1 and S2, no S3 or S4, and no murmur noted  GI: BS +, no distention, not pain on palpation        Primary Care Physician   Sulaiman Perales    Discharge Orders   No discharge procedures on file.    Significant Results and Procedures   Results for orders placed  or performed during the hospital encounter of 08/28/23   CT Abdomen Pelvis w Contrast    Narrative    EXAM: CT ABDOMEN PELVIS W CONTRAST  LOCATION: [facility]  DATE: 8/29/2023    INDICATION: Diverticulitis, now with worsening pain.  COMPARISON: CT abdomen and pelvis on 08/27/2023.  TECHNIQUE: CT scan of the abdomen and pelvis was performed after the injection of 93 ml Isovue 370 intravenously. Multiplanar reformats were obtained. Dose reduction techniques were used.    FINDINGS:   LOWER CHEST: Mild basilar pulmonary opacities, likely atelectasis.    ABDOMEN/PELVIS:    HEPATOBILIARY: No suspicious focal hepatic lesion. No radiodense gallstones.    PANCREAS: No main pancreatic ductal dilatation or definite solid pancreatic mass.    SPLEEN: No splenomegaly.    ADRENAL GLANDS: No adrenal nodules.    KIDNEYS/BLADDER: No radiodense kidney/ureteral stones or hydronephrosis in either kidney.    BOWEL: The appendix is visualized and appears normal. Colonic diverticulosis predominantly of the sigmoid colon with mild pericolonic fat stranding, findings worrisome for acute diverticulitis. Few mildly prominent small bowel loops in the left mid   abdomen, could represent focal ileus related to the adjacent inflammation.    PERITONEUM: Multiple foci of free peritoneal air, new as compared to 08/27/2023 exam, worrisome for colonic perforation. No significant change in the approximately 3.7 x 2.2 cm peripherally calcified hypodense area anterior to the diaphragmatic naomi   (series 3 image 46).    PELVIC ORGANS: Unremarkable.    VASCULATURE: Unremarkable.    LYMPH NODES: No significant abdominopelvic lymphadenopathy.    MUSCULOSKELETAL: No suspicious osseous lesion.      Impression    IMPRESSION:   1. Colonic diverticulosis with pericolonic fat stranding along the sigmoid colon, findings worrisome for acute diverticulitis, not significantly changed as compared to 08/27/2023 exam. However, there is new foci of  free peritoneal air, suggesting   interval colonic perforation.       Discharge Medications      Review of your medicines        UNREVIEWED medicines. Ask your doctor about these medicines        Dose / Directions   albuterol 108 (90 Base) MCG/ACT inhaler  Commonly known as: PROAIR HFA/PROVENTIL HFA/VENTOLIN HFA      Dose: 2 puff  Inhale 2 puffs into the lungs every 6 hours as needed for shortness of breath, wheezing or cough.  Refills: 0     aspirin 81 MG EC tablet  Used for: TIA (transient ischemic attack), History of TIA (transient ischemic attack) and stroke      Dose: 81 mg  Take 1 tablet (81 mg) by mouth daily  Quantity: 30 tablet  Refills: 0     atorvastatin 20 MG tablet  Commonly known as: LIPITOR      Dose: 20 mg  Take 20 mg by mouth daily  Refills: 0     cholecalciferol 25 mcg (1000 units) capsule  Commonly known as: VITAMIN D3      Dose: 2 capsule  Take 2 capsules by mouth daily.  Refills: 0     EPINEPHrine 0.3 MG/0.3ML injection 2-pack  Commonly known as: ANY BX GENERIC EQUIV      Dose: 0.3 mg  Inject 0.3 mLs (0.3 mg) into the muscle once as needed for anaphylaxis May repeat one time in 5-15 minutes if response to initial dose is inadequate.  Quantity: 2 each  Refills: 0     escitalopram 5 MG tablet  Commonly known as: LEXAPRO      Dose: 5 mg  Take 5 mg by mouth daily  Refills: 0     finasteride 5 MG tablet  Commonly known as: PROSCAR      Dose: 5 mg  Take 5 mg by mouth daily  Refills: 0     metFORMIN 500 MG 24 hr tablet  Commonly known as: GLUCOPHAGE XR      Dose: 500 mg  Take 500 mg by mouth every morning  Refills: 0            CONTINUE these medicines which have NOT CHANGED        Dose / Directions   fish oil-omega-3 fatty acids 1000 MG capsule      Dose: 2 g  Take 2 g by mouth daily  Refills: 0            Allergies   Allergies   Allergen Reactions    Ciprofloxacin Angioedema     Lip swelling, throat fullness, nausea.      Metronidazole Angioedema     Lip swelling, throat fullness, nausea    Zosyn  [Piperacillin-Tazobactam In Dex] Difficulty breathing

## (undated) DEVICE — ENDO FORCEP ENDOJAW BIOPSY 2.8MMX230CM FB-220U

## (undated) DEVICE — KIT ENDO TURNOVER/PROCEDURE W/CLEAN A SCOPE LINERS 103888

## (undated) RX ORDER — SIMETHICONE 40MG/0.6ML
SUSPENSION, DROPS(FINAL DOSAGE FORM)(ML) ORAL
Status: DISPENSED
Start: 2023-10-31

## (undated) RX ORDER — FENTANYL CITRATE 50 UG/ML
INJECTION, SOLUTION INTRAMUSCULAR; INTRAVENOUS
Status: DISPENSED
Start: 2023-10-31